# Patient Record
Sex: FEMALE | Race: WHITE | Employment: UNEMPLOYED | URBAN - METROPOLITAN AREA
[De-identification: names, ages, dates, MRNs, and addresses within clinical notes are randomized per-mention and may not be internally consistent; named-entity substitution may affect disease eponyms.]

---

## 2019-02-18 PROBLEM — E66.01 MORBID OBESITY (HCC): Status: ACTIVE | Noted: 2019-02-18

## 2019-02-18 PROBLEM — E11.69 DIABETES MELLITUS TYPE 2 IN OBESE (HCC): Status: ACTIVE | Noted: 2019-02-18

## 2019-02-18 PROBLEM — I10 ESSENTIAL HYPERTENSION: Status: ACTIVE | Noted: 2019-02-18

## 2019-02-18 PROBLEM — E66.9 DIABETES MELLITUS TYPE 2 IN OBESE (HCC): Status: ACTIVE | Noted: 2019-02-18

## 2019-02-18 PROBLEM — J44.9 COPD MIXED TYPE (HCC): Status: ACTIVE | Noted: 2019-02-18

## 2019-02-18 PROBLEM — Z86.79 HX OF CONGESTIVE HEART FAILURE: Status: ACTIVE | Noted: 2019-02-18

## 2019-02-19 ENCOUNTER — OFFICE VISIT (OUTPATIENT)
Dept: FAMILY MEDICINE CLINIC | Age: 69
End: 2019-02-19
Payer: COMMERCIAL

## 2019-02-19 VITALS
OXYGEN SATURATION: 92 % | HEIGHT: 61 IN | RESPIRATION RATE: 16 BRPM | SYSTOLIC BLOOD PRESSURE: 92 MMHG | HEART RATE: 70 BPM | BODY MASS INDEX: 41.84 KG/M2 | DIASTOLIC BLOOD PRESSURE: 56 MMHG | WEIGHT: 221.6 LBS

## 2019-02-19 DIAGNOSIS — Z86.79 HX OF CONGESTIVE HEART FAILURE: ICD-10-CM

## 2019-02-19 DIAGNOSIS — M54.42 CHRONIC MIDLINE LOW BACK PAIN WITH BILATERAL SCIATICA: ICD-10-CM

## 2019-02-19 DIAGNOSIS — Z23 NEED FOR VACCINATION AGAINST STREPTOCOCCUS PNEUMONIAE USING PNEUMOCOCCAL CONJUGATE VACCINE 13: ICD-10-CM

## 2019-02-19 DIAGNOSIS — E11.69 DIABETES MELLITUS TYPE 2 IN OBESE (HCC): ICD-10-CM

## 2019-02-19 DIAGNOSIS — F41.9 ANXIETY AND DEPRESSION: ICD-10-CM

## 2019-02-19 DIAGNOSIS — R25.1 TREMOR: ICD-10-CM

## 2019-02-19 DIAGNOSIS — E11.42 DIABETIC PERIPHERAL NEUROPATHY (HCC): ICD-10-CM

## 2019-02-19 DIAGNOSIS — G89.29 CHRONIC MIDLINE LOW BACK PAIN WITH BILATERAL SCIATICA: ICD-10-CM

## 2019-02-19 DIAGNOSIS — Z12.11 SCREEN FOR COLON CANCER: ICD-10-CM

## 2019-02-19 DIAGNOSIS — I10 ESSENTIAL HYPERTENSION: ICD-10-CM

## 2019-02-19 DIAGNOSIS — Z91.81 AT HIGH RISK FOR FALLS: ICD-10-CM

## 2019-02-19 DIAGNOSIS — Z00.00 ROUTINE GENERAL MEDICAL EXAMINATION AT A HEALTH CARE FACILITY: Primary | ICD-10-CM

## 2019-02-19 DIAGNOSIS — M89.9 OSTEOPATHY: ICD-10-CM

## 2019-02-19 DIAGNOSIS — E66.01 MORBID OBESITY (HCC): ICD-10-CM

## 2019-02-19 DIAGNOSIS — J44.9 COPD MIXED TYPE (HCC): ICD-10-CM

## 2019-02-19 DIAGNOSIS — F32.A ANXIETY AND DEPRESSION: ICD-10-CM

## 2019-02-19 DIAGNOSIS — E66.9 DIABETES MELLITUS TYPE 2 IN OBESE (HCC): ICD-10-CM

## 2019-02-19 DIAGNOSIS — I25.10 CORONARY ARTERY DISEASE INVOLVING NATIVE CORONARY ARTERY OF NATIVE HEART WITHOUT ANGINA PECTORIS: ICD-10-CM

## 2019-02-19 DIAGNOSIS — G47.33 OSA (OBSTRUCTIVE SLEEP APNEA): ICD-10-CM

## 2019-02-19 DIAGNOSIS — Z76.89 ESTABLISHING CARE WITH NEW DOCTOR, ENCOUNTER FOR: ICD-10-CM

## 2019-02-19 DIAGNOSIS — E78.5 DYSLIPIDEMIA: ICD-10-CM

## 2019-02-19 DIAGNOSIS — Z12.39 SCREENING FOR BREAST CANCER: ICD-10-CM

## 2019-02-19 DIAGNOSIS — M54.41 CHRONIC MIDLINE LOW BACK PAIN WITH BILATERAL SCIATICA: ICD-10-CM

## 2019-02-19 LAB — HBA1C MFR BLD: 8.8 %

## 2019-02-19 PROCEDURE — 99387 INIT PM E/M NEW PAT 65+ YRS: CPT | Performed by: FAMILY MEDICINE

## 2019-02-19 PROCEDURE — G0009 ADMIN PNEUMOCOCCAL VACCINE: HCPCS | Performed by: FAMILY MEDICINE

## 2019-02-19 PROCEDURE — 83036 HEMOGLOBIN GLYCOSYLATED A1C: CPT | Performed by: FAMILY MEDICINE

## 2019-02-19 PROCEDURE — 90670 PCV13 VACCINE IM: CPT | Performed by: FAMILY MEDICINE

## 2019-02-19 PROCEDURE — 99204 OFFICE O/P NEW MOD 45 MIN: CPT | Performed by: FAMILY MEDICINE

## 2019-02-19 RX ORDER — ATORVASTATIN CALCIUM 80 MG/1
TABLET, FILM COATED ORAL
Refills: 0 | COMMUNITY
Start: 2019-02-03 | End: 2019-02-19 | Stop reason: SDUPTHER

## 2019-02-19 RX ORDER — ALBUTEROL SULFATE 90 UG/1
2 AEROSOL, METERED RESPIRATORY (INHALATION) 4 TIMES DAILY PRN
Qty: 1 INHALER | Refills: 3 | Status: SHIPPED | OUTPATIENT
Start: 2019-02-19 | End: 2019-06-30 | Stop reason: SDUPTHER

## 2019-02-19 RX ORDER — FLUTICASONE FUROATE AND VILANTEROL 100; 25 UG/1; UG/1
1 POWDER RESPIRATORY (INHALATION) DAILY
Qty: 3 EACH | Refills: 3 | Status: SHIPPED | OUTPATIENT
Start: 2019-02-19 | End: 2020-01-03

## 2019-02-19 RX ORDER — METOPROLOL TARTRATE 50 MG/1
TABLET, FILM COATED ORAL
Refills: 0 | COMMUNITY
Start: 2018-11-29 | End: 2019-04-05 | Stop reason: SDUPTHER

## 2019-02-19 RX ORDER — RANOLAZINE 500 MG/1
TABLET, FILM COATED, EXTENDED RELEASE ORAL
Refills: 0 | COMMUNITY
Start: 2019-01-24 | End: 2019-05-02 | Stop reason: SDUPTHER

## 2019-02-19 RX ORDER — ATORVASTATIN CALCIUM 80 MG/1
80 TABLET, FILM COATED ORAL DAILY
Qty: 90 TABLET | Refills: 3 | Status: SHIPPED | OUTPATIENT
Start: 2019-02-19 | End: 2020-01-03 | Stop reason: SDUPTHER

## 2019-02-19 RX ORDER — LANCETS 30 GAUGE
EACH MISCELLANEOUS
Qty: 200 EACH | Refills: 5 | Status: SHIPPED | OUTPATIENT
Start: 2019-02-19 | End: 2020-01-03 | Stop reason: SDUPTHER

## 2019-02-19 RX ORDER — GLIMEPIRIDE 2 MG/1
2 TABLET ORAL
Qty: 60 TABLET | Refills: 2 | Status: SHIPPED | OUTPATIENT
Start: 2019-02-19 | End: 2019-05-10 | Stop reason: SDUPTHER

## 2019-02-19 RX ORDER — ACETAMINOPHEN 500 MG
1000 TABLET ORAL 4 TIMES DAILY PRN
Qty: 90 TABLET | Refills: 11 | Status: SHIPPED | OUTPATIENT
Start: 2019-02-19 | End: 2019-03-14 | Stop reason: ALTCHOICE

## 2019-02-19 RX ORDER — VENLAFAXINE HYDROCHLORIDE 150 MG/1
150 CAPSULE, EXTENDED RELEASE ORAL DAILY
Qty: 30 CAPSULE | Refills: 1 | Status: SHIPPED | OUTPATIENT
Start: 2019-02-19 | End: 2019-04-02 | Stop reason: SDUPTHER

## 2019-02-19 RX ORDER — SERTRALINE HYDROCHLORIDE 100 MG/1
TABLET, FILM COATED ORAL
Refills: 0 | COMMUNITY
Start: 2019-01-28 | End: 2019-02-19

## 2019-02-19 RX ORDER — ISOSORBIDE MONONITRATE 30 MG/1
TABLET, EXTENDED RELEASE ORAL
Refills: 0 | COMMUNITY
Start: 2019-01-28 | End: 2019-04-02 | Stop reason: ALTCHOICE

## 2019-02-19 RX ORDER — CLOPIDOGREL BISULFATE 75 MG/1
TABLET ORAL
Refills: 0 | COMMUNITY
Start: 2019-01-28 | End: 2019-06-28 | Stop reason: SDUPTHER

## 2019-02-19 ASSESSMENT — ENCOUNTER SYMPTOMS
BLOOD IN STOOL: 0
SHORTNESS OF BREATH: 0
BACK PAIN: 1
EYE PAIN: 0

## 2019-02-19 ASSESSMENT — PATIENT HEALTH QUESTIONNAIRE - PHQ9
1. LITTLE INTEREST OR PLEASURE IN DOING THINGS: 3
2. FEELING DOWN, DEPRESSED OR HOPELESS: 3
SUM OF ALL RESPONSES TO PHQ9 QUESTIONS 1 & 2: 6
SUM OF ALL RESPONSES TO PHQ QUESTIONS 1-9: 6
SUM OF ALL RESPONSES TO PHQ QUESTIONS 1-9: 6

## 2019-03-08 RX ORDER — LISINOPRIL 30 MG/1
30 TABLET ORAL DAILY
Qty: 30 TABLET | Refills: 0 | Status: SHIPPED | OUTPATIENT
Start: 2019-03-08 | End: 2019-04-02 | Stop reason: SDUPTHER

## 2019-03-14 ENCOUNTER — OFFICE VISIT (OUTPATIENT)
Dept: NEUROLOGY | Age: 69
End: 2019-03-14
Payer: COMMERCIAL

## 2019-03-14 VITALS
WEIGHT: 215 LBS | SYSTOLIC BLOOD PRESSURE: 120 MMHG | HEART RATE: 78 BPM | DIASTOLIC BLOOD PRESSURE: 77 MMHG | HEIGHT: 61 IN | BODY MASS INDEX: 40.59 KG/M2

## 2019-03-14 DIAGNOSIS — G62.9 PERIPHERAL POLYNEUROPATHY: ICD-10-CM

## 2019-03-14 DIAGNOSIS — E11.65 POORLY CONTROLLED DIABETES MELLITUS (HCC): Primary | ICD-10-CM

## 2019-03-14 DIAGNOSIS — F32.A DEPRESSION, UNSPECIFIED DEPRESSION TYPE: ICD-10-CM

## 2019-03-14 DIAGNOSIS — R25.1 TREMORS OF NERVOUS SYSTEM: ICD-10-CM

## 2019-03-14 DIAGNOSIS — Z72.820 POOR SLEEP: ICD-10-CM

## 2019-03-14 PROCEDURE — 99205 OFFICE O/P NEW HI 60 MIN: CPT | Performed by: PSYCHIATRY & NEUROLOGY

## 2019-03-14 RX ORDER — LANOLIN ALCOHOL/MO/W.PET/CERES
10 CREAM (GRAM) TOPICAL NIGHTLY PRN
COMMUNITY
End: 2020-11-04

## 2019-03-18 ENCOUNTER — TELEPHONE (OUTPATIENT)
Dept: FAMILY MEDICINE CLINIC | Age: 69
End: 2019-03-18

## 2019-04-02 ENCOUNTER — HOSPITAL ENCOUNTER (OUTPATIENT)
Age: 69
Setting detail: SPECIMEN
Discharge: HOME | End: 2019-04-02
Payer: COMMERCIAL

## 2019-04-02 ENCOUNTER — OFFICE VISIT (OUTPATIENT)
Dept: FAMILY MEDICINE CLINIC | Age: 69
End: 2019-04-02
Payer: COMMERCIAL

## 2019-04-02 VITALS
RESPIRATION RATE: 16 BRPM | WEIGHT: 215 LBS | OXYGEN SATURATION: 93 % | DIASTOLIC BLOOD PRESSURE: 62 MMHG | HEIGHT: 61 IN | BODY MASS INDEX: 40.59 KG/M2 | SYSTOLIC BLOOD PRESSURE: 98 MMHG | HEART RATE: 67 BPM

## 2019-04-02 DIAGNOSIS — I10 ESSENTIAL HYPERTENSION: ICD-10-CM

## 2019-04-02 DIAGNOSIS — E11.69 DIABETES MELLITUS TYPE 2 IN OBESE (HCC): ICD-10-CM

## 2019-04-02 DIAGNOSIS — I25.10 CORONARY ARTERY DISEASE INVOLVING NATIVE CORONARY ARTERY OF NATIVE HEART WITHOUT ANGINA PECTORIS: ICD-10-CM

## 2019-04-02 DIAGNOSIS — Z91.81 AT HIGH RISK FOR FALLS: ICD-10-CM

## 2019-04-02 DIAGNOSIS — Z00.00 ROUTINE GENERAL MEDICAL EXAMINATION AT A HEALTH CARE FACILITY: ICD-10-CM

## 2019-04-02 DIAGNOSIS — E11.42 DIABETIC PERIPHERAL NEUROPATHY (HCC): ICD-10-CM

## 2019-04-02 DIAGNOSIS — E66.9 DIABETES MELLITUS TYPE 2 IN OBESE (HCC): ICD-10-CM

## 2019-04-02 DIAGNOSIS — F41.9 ANXIETY AND DEPRESSION: ICD-10-CM

## 2019-04-02 DIAGNOSIS — E78.5 DYSLIPIDEMIA: ICD-10-CM

## 2019-04-02 DIAGNOSIS — R25.1 TREMOR: Primary | ICD-10-CM

## 2019-04-02 DIAGNOSIS — F32.A ANXIETY AND DEPRESSION: ICD-10-CM

## 2019-04-02 LAB
ABSOLUTE EOS #: 0.12 K/UL (ref 0–0.44)
ABSOLUTE IMMATURE GRANULOCYTE: 0.1 K/UL (ref 0–0.3)
ABSOLUTE LYMPH #: 3.91 K/UL (ref 1.1–3.7)
ABSOLUTE MONO #: 0.98 K/UL (ref 0.1–1.2)
ALBUMIN SERPL-MCNC: 4.2 G/DL (ref 3.5–5.2)
ALBUMIN/GLOBULIN RATIO: 1.1 (ref 1–2.5)
ALP BLD-CCNC: 82 U/L (ref 35–104)
ALT SERPL-CCNC: 11 U/L (ref 5–33)
ANION GAP SERPL CALCULATED.3IONS-SCNC: 17 MMOL/L (ref 9–17)
AST SERPL-CCNC: 15 U/L
BASOPHILS # BLD: 1 % (ref 0–2)
BASOPHILS ABSOLUTE: 0.1 K/UL (ref 0–0.2)
BILIRUB SERPL-MCNC: 0.47 MG/DL (ref 0.3–1.2)
BUN BLDV-MCNC: 11 MG/DL (ref 8–23)
BUN/CREAT BLD: ABNORMAL (ref 9–20)
CALCIUM SERPL-MCNC: 9.7 MG/DL (ref 8.6–10.4)
CHLORIDE BLD-SCNC: 103 MMOL/L (ref 98–107)
CHOLESTEROL/HDL RATIO: 2.5
CHOLESTEROL: 144 MG/DL
CO2: 22 MMOL/L (ref 20–31)
CREAT SERPL-MCNC: 1.07 MG/DL (ref 0.5–0.9)
DIFFERENTIAL TYPE: ABNORMAL
EOSINOPHILS RELATIVE PERCENT: 1 % (ref 1–4)
ESTIMATED AVERAGE GLUCOSE: 166 MG/DL
GFR AFRICAN AMERICAN: >60 ML/MIN
GFR NON-AFRICAN AMERICAN: 51 ML/MIN
GFR SERPL CREATININE-BSD FRML MDRD: ABNORMAL ML/MIN/{1.73_M2}
GFR SERPL CREATININE-BSD FRML MDRD: ABNORMAL ML/MIN/{1.73_M2}
GLUCOSE BLD-MCNC: 156 MG/DL (ref 70–99)
HBA1C MFR BLD: 7.4 % (ref 4–6)
HCT VFR BLD CALC: 48.5 % (ref 36.3–47.1)
HDLC SERPL-MCNC: 58 MG/DL
HEMOGLOBIN: 15.1 G/DL (ref 11.9–15.1)
HEPATITIS C ANTIBODY: NONREACTIVE
HIV AG/AB: NONREACTIVE
IMMATURE GRANULOCYTES: 1 %
LDL CHOLESTEROL: 59 MG/DL (ref 0–130)
LYMPHOCYTES # BLD: 26 % (ref 24–43)
MCH RBC QN AUTO: 29 PG (ref 25.2–33.5)
MCHC RBC AUTO-ENTMCNC: 31.1 G/DL (ref 28.4–34.8)
MCV RBC AUTO: 93.1 FL (ref 82.6–102.9)
MONOCYTES # BLD: 7 % (ref 3–12)
NRBC AUTOMATED: 0 PER 100 WBC
PDW BLD-RTO: 13.3 % (ref 11.8–14.4)
PLATELET # BLD: 478 K/UL (ref 138–453)
PLATELET ESTIMATE: ABNORMAL
PMV BLD AUTO: 10.4 FL (ref 8.1–13.5)
POTASSIUM SERPL-SCNC: 4.4 MMOL/L (ref 3.7–5.3)
RBC # BLD: 5.21 M/UL (ref 3.95–5.11)
RBC # BLD: ABNORMAL 10*6/UL
SEG NEUTROPHILS: 64 % (ref 36–65)
SEGMENTED NEUTROPHILS ABSOLUTE COUNT: 9.98 K/UL (ref 1.5–8.1)
SODIUM BLD-SCNC: 142 MMOL/L (ref 135–144)
TOTAL PROTEIN: 7.9 G/DL (ref 6.4–8.3)
TRIGL SERPL-MCNC: 135 MG/DL
TSH SERPL DL<=0.05 MIU/L-ACNC: 2.66 MIU/L (ref 0.3–5)
VLDLC SERPL CALC-MCNC: NORMAL MG/DL (ref 1–30)
WBC # BLD: 15.2 K/UL (ref 3.5–11.3)
WBC # BLD: ABNORMAL 10*3/UL

## 2019-04-02 PROCEDURE — 99214 OFFICE O/P EST MOD 30 MIN: CPT | Performed by: FAMILY MEDICINE

## 2019-04-02 RX ORDER — VENLAFAXINE HYDROCHLORIDE 150 MG/1
150 CAPSULE, EXTENDED RELEASE ORAL DAILY
Qty: 30 CAPSULE | Refills: 1 | Status: SHIPPED | OUTPATIENT
Start: 2019-04-02 | End: 2019-05-02 | Stop reason: ALTCHOICE

## 2019-04-02 RX ORDER — VENLAFAXINE HYDROCHLORIDE 75 MG/1
75 CAPSULE, EXTENDED RELEASE ORAL DAILY
Qty: 30 CAPSULE | Refills: 1 | Status: SHIPPED | OUTPATIENT
Start: 2019-04-02 | End: 2019-05-02 | Stop reason: ALTCHOICE

## 2019-04-02 RX ORDER — LISINOPRIL 30 MG/1
30 TABLET ORAL DAILY
Qty: 30 TABLET | Refills: 1 | Status: SHIPPED | OUTPATIENT
Start: 2019-04-02 | End: 2019-05-02 | Stop reason: SDUPTHER

## 2019-04-02 RX ORDER — GABAPENTIN 300 MG/1
CAPSULE ORAL
Qty: 90 CAPSULE | Refills: 1 | Status: SHIPPED | OUTPATIENT
Start: 2019-04-02 | End: 2019-04-24 | Stop reason: SINTOL

## 2019-04-02 RX ORDER — M-VIT,TX,IRON,MINS/CALC/FOLIC 27MG-0.4MG
1 TABLET ORAL DAILY
COMMUNITY
End: 2020-09-30 | Stop reason: SDUPTHER

## 2019-04-02 ASSESSMENT — ENCOUNTER SYMPTOMS
BLOOD IN STOOL: 0
SHORTNESS OF BREATH: 0
EYE PAIN: 0

## 2019-04-02 NOTE — PROGRESS NOTES
Visit Information    Have you changed or started any medications since your last visit including any over-the-counter medicines, vitamins, or herbal medicines? no   Are you having any side effects from any of your medications? -  no  Have you stopped taking any of your medications? Is so, why? -  no    Have you seen any other physician or provider since your last visit? No  Have you had any other diagnostic tests since your last visit? No  Have you been seen in the emergency room and/or had an admission to a hospital since we last saw you? No  Have you had your routine dental cleaning in the past 6 months? no    Have you activated your [employer] account? If not, what are your barriers?  No: na     Patient Care Team:  Juan Urias MD as PCP - General (Family Medicine)  Charley Gibson MD as Consulting Physician (Gastroenterology)    Medical History Review  Past Medical, Family, and Social History reviewed and does not contribute to the patient presenting condition    Health Maintenance   Topic Date Due    Potassium monitoring  1952    Creatinine monitoring  1952    Hepatitis C screen  1950    Diabetic foot exam  04/29/1962    Diabetic retinal exam  04/29/1962    Lipid screen  04/29/1962    Diabetic microalbuminuria test  04/29/1970    DTaP/Tdap/Td vaccine (1 - Tdap) 04/29/1971    Breast cancer screen  04/29/2002    Shingles Vaccine (1 of 2) 04/29/2002    Colon cancer screen colonoscopy  04/29/2002    DEXA (modify frequency per FRAX score)  04/29/2017    Flu vaccine (Season Ended) 02/19/2020 (Originally 9/1/2019)    A1C test (Diabetic or Prediabetic)  02/19/2020    Pneumococcal 65+ years Vaccine (2 of 2 - PPSV23) 02/01/2021

## 2019-04-02 NOTE — PATIENT INSTRUCTIONS
[facility], [address] [place] [address], [address] [place] [address]   [phone]

## 2019-04-02 NOTE — PROGRESS NOTES
heard.  Pulmonary/Chest: Effort normal and breath sounds normal. No respiratory distress. She has no wheezes. Neurological: She is alert. Skin: She is not diaphoretic. Diabetic foot exam: Sensation intact in bilateral feet on testing with monofilament, no ulceration noted   Psychiatric: She has a normal mood and affect. Her behavior is normal. Judgment and thought content normal.       Assessment & Plan:      1. Anxiety and depression  Effexor dose increased, follow-up in 4 weeks  - venlafaxine (EFFEXOR XR) 150 MG extended release capsule; Take 1 capsule by mouth daily Take with 75 mg capsule  Dispense: 30 capsule; Refill: 1  - venlafaxine (EFFEXOR XR) 75 MG extended release capsule; Take 1 capsule by mouth daily  Dispense: 30 capsule; Refill: 1    2. Diabetes mellitus type 2 in [facility])  She does not have her glucose log with her, labs ordered, continue Ozempic and Amaryl, follow-up with glucose log at next visit  - Insulin Pen Needle 32G X 4 MM MISC; 1 each by Does not apply route once a week  Dispense: 200 each; Refill: 3  - Hemoglobin A1C; Future  - Microalbumin, Ur; Future    3. Tremor  Referral sent for second opinion for query Parkinson's  - Allen Bullard MD, Neurology, [facility]    4. At high risk for falls  - Misc. Devices ([employer] QUAD CANE) MISC; Use daily with ambulation    5. Diabetic peripheral neuropathy (HCC)  Rx for gabapentin, advised on sedation, follow-up in 4 weeks  - gabapentin (NEURONTIN) 300 MG capsule; Day 1: 300 mg, Day 2: 300 mg twice daily, Day 3 onwards: 300 mg 3 times daily  Dispense: 90 capsule; Refill: 1  - [facility] DIABETES FOOT EXAM    HTN, CAD  Referral information reprinted for the cardiologist, continue lisinopril, Imdur stopped due to hypotension, follow-up in 4 weeks    Call or return to clinic prn if these symptoms worsen or fail to improve as anticipated.   I have reviewed the instructions with the patient, answering all questions to their satisfaction.     Electronically signed by Carlos Dolan MD on 4/2/2019 at 3:43 PM

## 2019-04-03 DIAGNOSIS — N18.30 STAGE 3 CHRONIC KIDNEY DISEASE (HCC): ICD-10-CM

## 2019-04-03 DIAGNOSIS — D72.829 LEUKOCYTOSIS, UNSPECIFIED TYPE: Primary | ICD-10-CM

## 2019-04-06 RX ORDER — METOPROLOL TARTRATE 50 MG/1
TABLET, FILM COATED ORAL
Qty: 90 TABLET | Refills: 3 | Status: SHIPPED | OUTPATIENT
Start: 2019-04-06 | End: 2019-05-02 | Stop reason: ALTCHOICE

## 2019-04-10 DIAGNOSIS — E66.9 DIABETES MELLITUS TYPE 2 IN OBESE (HCC): ICD-10-CM

## 2019-04-10 DIAGNOSIS — E11.69 DIABETES MELLITUS TYPE 2 IN OBESE (HCC): ICD-10-CM

## 2019-04-24 ENCOUNTER — OFFICE VISIT (OUTPATIENT)
Dept: NEUROLOGY | Age: 69
End: 2019-04-24
Payer: COMMERCIAL

## 2019-04-24 VITALS
HEART RATE: 79 BPM | DIASTOLIC BLOOD PRESSURE: 62 MMHG | SYSTOLIC BLOOD PRESSURE: 92 MMHG | WEIGHT: 215 LBS | BODY MASS INDEX: 40.65 KG/M2

## 2019-04-24 DIAGNOSIS — F32.A DEPRESSION, UNSPECIFIED DEPRESSION TYPE: ICD-10-CM

## 2019-04-24 DIAGNOSIS — Z72.820 POOR SLEEP: ICD-10-CM

## 2019-04-24 DIAGNOSIS — E11.65 POORLY CONTROLLED DIABETES MELLITUS (HCC): ICD-10-CM

## 2019-04-24 DIAGNOSIS — R25.1 TREMORS OF NERVOUS SYSTEM: Primary | ICD-10-CM

## 2019-04-24 DIAGNOSIS — G62.9 PERIPHERAL POLYNEUROPATHY: ICD-10-CM

## 2019-04-24 PROCEDURE — 99205 OFFICE O/P NEW HI 60 MIN: CPT | Performed by: STUDENT IN AN ORGANIZED HEALTH CARE EDUCATION/TRAINING PROGRAM

## 2019-04-24 RX ORDER — GABAPENTIN 300 MG/1
CAPSULE ORAL
Qty: 360 CAPSULE | Refills: 0 | Status: SHIPPED | OUTPATIENT
Start: 2019-04-24 | End: 2019-09-09 | Stop reason: SDUPTHER

## 2019-04-24 ASSESSMENT — ENCOUNTER SYMPTOMS
PHOTOPHOBIA: 0
VOMITING: 0
EYE PAIN: 0
EYE REDNESS: 0
SINUS PAIN: 0
SHORTNESS OF BREATH: 1
NAUSEA: 0
EYE DISCHARGE: 0
SORE THROAT: 0
BACK PAIN: 1
COUGH: 0
CONSTIPATION: 0
DIARRHEA: 0
ABDOMINAL PAIN: 0
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
## 2019-05-02 ENCOUNTER — OFFICE VISIT (OUTPATIENT)
Dept: FAMILY MEDICINE CLINIC | Age: 69
End: 2019-05-02
Payer: COMMERCIAL

## 2019-05-02 VITALS
BODY MASS INDEX: 40.75 KG/M2 | HEIGHT: 61 IN | DIASTOLIC BLOOD PRESSURE: 53 MMHG | WEIGHT: 215.8 LBS | SYSTOLIC BLOOD PRESSURE: 82 MMHG | HEART RATE: 77 BPM | RESPIRATION RATE: 16 BRPM | OXYGEN SATURATION: 92 %

## 2019-05-02 DIAGNOSIS — I25.10 CORONARY ARTERY DISEASE INVOLVING NATIVE CORONARY ARTERY OF NATIVE HEART WITHOUT ANGINA PECTORIS: ICD-10-CM

## 2019-05-02 DIAGNOSIS — J44.9 COPD MIXED TYPE (HCC): ICD-10-CM

## 2019-05-02 DIAGNOSIS — F41.9 ANXIETY AND DEPRESSION: ICD-10-CM

## 2019-05-02 DIAGNOSIS — F32.A ANXIETY AND DEPRESSION: ICD-10-CM

## 2019-05-02 DIAGNOSIS — E11.69 DIABETES MELLITUS TYPE 2 IN OBESE (HCC): ICD-10-CM

## 2019-05-02 DIAGNOSIS — E66.9 DIABETES MELLITUS TYPE 2 IN OBESE (HCC): ICD-10-CM

## 2019-05-02 DIAGNOSIS — I10 ESSENTIAL HYPERTENSION: Primary | ICD-10-CM

## 2019-05-02 PROCEDURE — 99214 OFFICE O/P EST MOD 30 MIN: CPT | Performed by: FAMILY MEDICINE

## 2019-05-02 RX ORDER — RANOLAZINE 500 MG/1
500 TABLET, EXTENDED RELEASE ORAL 2 TIMES DAILY
Qty: 60 TABLET | Refills: 2 | Status: SHIPPED | OUTPATIENT
Start: 2019-05-02 | End: 2019-08-02 | Stop reason: SDUPTHER

## 2019-05-02 RX ORDER — BISOPROLOL FUMARATE 5 MG/1
5 TABLET ORAL DAILY
Qty: 90 TABLET | Refills: 3 | Status: SHIPPED | OUTPATIENT
Start: 2019-05-02 | End: 2019-09-13 | Stop reason: SDUPTHER

## 2019-05-02 RX ORDER — LISINOPRIL 20 MG/1
20 TABLET ORAL DAILY
Qty: 90 TABLET | Refills: 3 | Status: SHIPPED | OUTPATIENT
Start: 2019-05-02 | End: 2019-09-13 | Stop reason: SDUPTHER

## 2019-05-02 RX ORDER — DULOXETIN HYDROCHLORIDE 60 MG/1
60 CAPSULE, DELAYED RELEASE ORAL DAILY
Qty: 30 CAPSULE | Refills: 1 | Status: SHIPPED | OUTPATIENT
Start: 2019-05-02 | End: 2019-05-10 | Stop reason: ALTCHOICE

## 2019-05-02 ASSESSMENT — ENCOUNTER SYMPTOMS
SHORTNESS OF BREATH: 0
BLOOD IN STOOL: 0
EYE PAIN: 0

## 2019-05-02 NOTE — PATIENT INSTRUCTIONS
[address] [facility], [address] [place] [address], [address] [place] [address]   [phone]

## 2019-05-02 NOTE — PROGRESS NOTES
[facility]  [address] [place] [id number]  Dept: [phone]    Natalee Lorenz is a 81 y.o. female who presents today for hermedical conditions/complaints as noted below.   Natalee Lorenz is here today c/o 1 Month Follow-Up       HPI:     HPI    Seen today with     T2DM, A1C 7.4 4/3, on Ozempic, Amaryl   Not checking sugars at home  Last eye exam: March 2019    Depression, Effexor dose increased, still feels agitated, on edge, also has issues with chronic pain, would like to change to a different medication, denies SI, not interested in seeing counselor  CAD, referred to cardiology but did not go, stopped Imdur as BP low, blood pressure continues to be low today, endorses feeling dizzy at times  Tremors, saw neurology Dr. Kerrie Lawler, she does not think she has Parkinson's, gabapentin dose increased, told to follow up in 3 months  COPD, Rx for Breo ellipta, morning cough with wheezing persists, using her inhaler regularly  WBC, creatinine elevated, labs ordered for repeat    Still did not do DEXA, mammogram, cologuard    Patient Active Problem List   Diagnosis    Essential hypertension    Diabetes mellitus type 2 in obese ([address]    Hx of congestive heart failure    COPD mixed type ([address]    Morbid obesity ([address]    Chronic midline low back pain with bilateral sciatica    Diabetic peripheral neuropathy ([address]    Dyslipidemia    Coronary artery disease involving native coronary artery of native heart without angina pectoris    SAGAR (obstructive sleep apnea)    Stage 3 chronic kidney disease ([address]       Past Medical History:   Diagnosis Date    Acid reflux     Arthritis     CHF (congestive heart failure) ([address]     COPD (chronic obstructive pulmonary disease) ([address]     Gall stones     Head injury     Heart attack ([address] 1999    Hyperlipidemia     Hypertension     Insomnia     Macular degeneration     MI (PLAVIX) 75 MG tablet, , Disp: , Rfl: 0    Lancets MISC, T2DM, use up to three times daily as needed, Disp: 200 each, Rfl: 5    Blood Glucose Monitoring Suppl KIT, Blood glucose monitor, Disp: 1 kit, Rfl: 0    blood glucose test strips ([employer] BLOOD GLUCOSE TEST) strip, T2DM, use up to three times daily as needed, Disp: 200 strip, Rfl: 5    Semaglutide (OZEMPIC) 1 MG/DOSE SOPN, Already has sample kit to start on ozempic, now needs 1 mg per week, Disp: 2 pen, Rfl: 5    glimepiride (AMARYL) 2 MG tablet, Take 1 tablet by mouth 2 times daily (before meals), Disp: 60 tablet, Rfl: 2    atorvastatin (LIPITOR) 80 MG tablet, Take 1 tablet by mouth daily, Disp: 90 tablet, Rfl: 3    albuterol sulfate  (90 Base) MCG/ACT inhaler, Inhale 2 puffs into the lungs 4 times daily as needed for Wheezing, Disp: 1 Inhaler, Rfl: 3    fluticasone-vilanterol (BREO ELLIPTA) 100-25 MCG/INH AEPB inhaler, Inhale 1 puff into the lungs daily, Disp: 3 each, Rfl: 3    Subjective:     Review of Systems   Constitutional: Negative for appetite change, diaphoresis, fever and unexpected weight change. HENT: Negative for ear pain. Eyes: Negative for pain. Respiratory: Negative for shortness of breath. Cardiovascular: Negative for chest pain and leg swelling. Gastrointestinal: Negative for blood in stool. Musculoskeletal: Negative for gait problem. Skin: Negative for pallor. Neurological: Positive for dizziness. Negative for seizures. Psychiatric/Behavioral: Positive for agitation. Negative for suicidal ideas. The patient is nervous/anxious. Objective:     BP (!) 82/53   Pulse 77   Resp 16   Ht 5' 1\" (1.549 m)   Wt 215 lb 12.8 oz (97.9 kg)   SpO2 92%   BMI 40.78 kg/m²     Physical Exam   Constitutional: She appears well-developed. HENT:   Head: Normocephalic. Eyes: Conjunctivae and EOM are normal.   Cardiovascular: Normal heart sounds. No murmur heard.   Pulmonary/Chest: Effort normal and breath sounds

## 2019-05-07 ENCOUNTER — TELEPHONE (OUTPATIENT)
Dept: FAMILY MEDICINE CLINIC | Age: 69
End: 2019-05-07

## 2019-05-10 DIAGNOSIS — E11.69 DIABETES MELLITUS TYPE 2 IN OBESE (HCC): ICD-10-CM

## 2019-05-10 DIAGNOSIS — E66.9 DIABETES MELLITUS TYPE 2 IN OBESE (HCC): ICD-10-CM

## 2019-05-10 DIAGNOSIS — G89.29 CHRONIC MIDLINE LOW BACK PAIN WITH BILATERAL SCIATICA: Primary | ICD-10-CM

## 2019-05-10 DIAGNOSIS — M54.41 CHRONIC MIDLINE LOW BACK PAIN WITH BILATERAL SCIATICA: Primary | ICD-10-CM

## 2019-05-10 DIAGNOSIS — M54.42 CHRONIC MIDLINE LOW BACK PAIN WITH BILATERAL SCIATICA: Primary | ICD-10-CM

## 2019-05-10 RX ORDER — GLIMEPIRIDE 2 MG/1
TABLET ORAL
Qty: 180 TABLET | Refills: 3 | Status: SHIPPED | OUTPATIENT
Start: 2019-05-10 | End: 2020-01-03 | Stop reason: SDUPTHER

## 2019-05-10 NOTE — TELEPHONE ENCOUNTER
PT states her recent change cymbalta is not working. Pt reports being more irritable on this medication and would like to try another med.  Please advise

## 2019-05-13 ENCOUNTER — TELEPHONE (OUTPATIENT)
Dept: FAMILY MEDICINE CLINIC | Age: 69
End: 2019-05-13

## 2019-06-11 LAB
BASOPHILS ABSOLUTE: NORMAL /ΜL
BASOPHILS RELATIVE PERCENT: NORMAL %
EOSINOPHILS ABSOLUTE: NORMAL /ΜL
EOSINOPHILS RELATIVE PERCENT: NORMAL %
HCT VFR BLD CALC: 43.8 % (ref 36–46)
HEMOGLOBIN: 13.8 G/DL (ref 12–16)
LYMPHOCYTES ABSOLUTE: NORMAL /ΜL
LYMPHOCYTES RELATIVE PERCENT: NORMAL %
MCH RBC QN AUTO: 29.6 PG
MCHC RBC AUTO-ENTMCNC: 31.5 G/DL
MCV RBC AUTO: 94 FL
MONOCYTES ABSOLUTE: NORMAL /ΜL
MONOCYTES RELATIVE PERCENT: NORMAL %
NEUTROPHILS ABSOLUTE: NORMAL /ΜL
NEUTROPHILS RELATIVE PERCENT: NORMAL %
PDW BLD-RTO: 46.7 %
PLATELET # BLD: 456 K/ΜL
PMV BLD AUTO: NORMAL FL
RBC # BLD: 4.66 10^6/ΜL
WBC # BLD: 13.28 10^3/ML

## 2019-06-12 DIAGNOSIS — D72.829 LEUKOCYTOSIS, UNSPECIFIED TYPE: Primary | ICD-10-CM

## 2019-06-12 DIAGNOSIS — D72.829 LEUKOCYTOSIS, UNSPECIFIED TYPE: ICD-10-CM

## 2019-06-12 DIAGNOSIS — N18.30 STAGE 3 CHRONIC KIDNEY DISEASE (HCC): ICD-10-CM

## 2019-06-12 LAB
BUN BLDV-MCNC: 14 MG/DL
CALCIUM SERPL-MCNC: 9.2 MG/DL
CHLORIDE BLD-SCNC: 103 MMOL/L
CO2: 24 MMOL/L
CREAT SERPL-MCNC: 0.84 MG/DL
GFR CALCULATED: NORMAL
GLUCOSE BLD-MCNC: 130 MG/DL
POTASSIUM SERPL-SCNC: 4.5 MMOL/L
SODIUM BLD-SCNC: 138 MMOL/L

## 2019-06-13 ENCOUNTER — OFFICE VISIT (OUTPATIENT)
Dept: FAMILY MEDICINE CLINIC | Age: 69
End: 2019-06-13
Payer: COMMERCIAL

## 2019-06-13 ENCOUNTER — TELEPHONE (OUTPATIENT)
Dept: ONCOLOGY | Age: 69
End: 2019-06-13

## 2019-06-13 VITALS
HEART RATE: 68 BPM | WEIGHT: 220.8 LBS | BODY MASS INDEX: 41.69 KG/M2 | OXYGEN SATURATION: 90 % | HEIGHT: 61 IN | SYSTOLIC BLOOD PRESSURE: 103 MMHG | DIASTOLIC BLOOD PRESSURE: 66 MMHG | RESPIRATION RATE: 16 BRPM

## 2019-06-13 DIAGNOSIS — J44.9 COPD MIXED TYPE (HCC): ICD-10-CM

## 2019-06-13 DIAGNOSIS — R31.9 HEMATURIA, UNSPECIFIED TYPE: ICD-10-CM

## 2019-06-13 DIAGNOSIS — E66.9 DIABETES MELLITUS TYPE 2 IN OBESE (HCC): Primary | ICD-10-CM

## 2019-06-13 DIAGNOSIS — I10 ESSENTIAL HYPERTENSION: ICD-10-CM

## 2019-06-13 DIAGNOSIS — F41.9 ANXIETY AND DEPRESSION: ICD-10-CM

## 2019-06-13 DIAGNOSIS — D72.829 LEUKOCYTOSIS, UNSPECIFIED TYPE: ICD-10-CM

## 2019-06-13 DIAGNOSIS — E66.01 MORBID OBESITY (HCC): ICD-10-CM

## 2019-06-13 DIAGNOSIS — K21.9 GASTROESOPHAGEAL REFLUX DISEASE, ESOPHAGITIS PRESENCE NOT SPECIFIED: ICD-10-CM

## 2019-06-13 DIAGNOSIS — F32.A ANXIETY AND DEPRESSION: ICD-10-CM

## 2019-06-13 DIAGNOSIS — E11.69 DIABETES MELLITUS TYPE 2 IN OBESE (HCC): Primary | ICD-10-CM

## 2019-06-13 DIAGNOSIS — G47.00 INSOMNIA, UNSPECIFIED TYPE: ICD-10-CM

## 2019-06-13 DIAGNOSIS — E11.42 DIABETIC PERIPHERAL NEUROPATHY (HCC): ICD-10-CM

## 2019-06-13 PROCEDURE — 99214 OFFICE O/P EST MOD 30 MIN: CPT | Performed by: FAMILY MEDICINE

## 2019-06-13 RX ORDER — OMEPRAZOLE 40 MG/1
40 CAPSULE, DELAYED RELEASE ORAL
Qty: 90 CAPSULE | Refills: 3 | Status: SHIPPED | OUTPATIENT
Start: 2019-06-13 | End: 2020-02-12

## 2019-06-13 RX ORDER — TRAZODONE HYDROCHLORIDE 50 MG/1
50 TABLET ORAL NIGHTLY PRN
Qty: 30 TABLET | Refills: 3 | Status: SHIPPED | OUTPATIENT
Start: 2019-06-13 | End: 2019-09-13 | Stop reason: SDUPTHER

## 2019-06-13 RX ORDER — VENLAFAXINE HYDROCHLORIDE 75 MG/1
75 CAPSULE, EXTENDED RELEASE ORAL DAILY
Qty: 30 CAPSULE | Refills: 5 | Status: SHIPPED | OUTPATIENT
Start: 2019-06-13 | End: 2019-09-13 | Stop reason: SDUPTHER

## 2019-06-13 RX ORDER — VENLAFAXINE HYDROCHLORIDE 150 MG/1
150 CAPSULE, EXTENDED RELEASE ORAL DAILY
Qty: 30 CAPSULE | Refills: 5 | Status: SHIPPED | OUTPATIENT
Start: 2019-06-13 | End: 2019-09-13 | Stop reason: SDUPTHER

## 2019-06-13 ASSESSMENT — ENCOUNTER SYMPTOMS
BLOOD IN STOOL: 0
SHORTNESS OF BREATH: 0
EYE PAIN: 0
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
## 2019-06-17 DIAGNOSIS — R82.90 CLOUDY URINE: Primary | ICD-10-CM

## 2019-06-17 RX ORDER — NITROFURANTOIN 25; 75 MG/1; MG/1
100 CAPSULE ORAL 2 TIMES DAILY
Qty: 10 CAPSULE | Refills: 0 | Status: SHIPPED | OUTPATIENT
Start: 2019-06-17 | End: 2019-06-22

## 2019-06-19 ENCOUNTER — TELEPHONE (OUTPATIENT)
Dept: ONCOLOGY | Age: 69
End: 2019-06-19

## 2019-06-28 DIAGNOSIS — J44.9 COPD MIXED TYPE (HCC): ICD-10-CM

## 2019-06-30 RX ORDER — ALBUTEROL SULFATE 90 UG/1
2 AEROSOL, METERED RESPIRATORY (INHALATION) 4 TIMES DAILY PRN
Qty: 1 INHALER | Refills: 5 | Status: SHIPPED | OUTPATIENT
Start: 2019-06-30 | End: 2020-03-12 | Stop reason: SDUPTHER

## 2019-06-30 RX ORDER — CLOPIDOGREL BISULFATE 75 MG/1
75 TABLET ORAL DAILY
Qty: 30 TABLET | Refills: 3 | Status: SHIPPED | OUTPATIENT
Start: 2019-06-30 | End: 2019-10-17 | Stop reason: SDUPTHER

## 2019-07-01 ENCOUNTER — TELEPHONE (OUTPATIENT)
Dept: ONCOLOGY | Age: 69
End: 2019-07-01

## 2019-08-13 ENCOUNTER — HOSPITAL ENCOUNTER (OUTPATIENT)
Facility: MEDICAL CENTER | Age: 69
End: 2019-08-13
Payer: COMMERCIAL

## 2019-08-19 ENCOUNTER — TELEPHONE (OUTPATIENT)
Dept: ONCOLOGY | Age: 69
End: 2019-08-19

## 2019-08-20 ENCOUNTER — TELEPHONE (OUTPATIENT)
Dept: ONCOLOGY | Age: 69
End: 2019-08-20

## 2019-08-27 ENCOUNTER — TELEPHONE (OUTPATIENT)
Dept: ONCOLOGY | Age: 69
End: 2019-08-27

## 2019-09-10 RX ORDER — GABAPENTIN 300 MG/1
CAPSULE ORAL
Qty: 360 CAPSULE | Refills: 0 | Status: SHIPPED | OUTPATIENT
Start: 2019-09-10 | End: 2020-01-24

## 2019-09-13 ENCOUNTER — OFFICE VISIT (OUTPATIENT)
Dept: FAMILY MEDICINE CLINIC | Age: 69
End: 2019-09-13
Payer: COMMERCIAL

## 2019-09-13 VITALS
DIASTOLIC BLOOD PRESSURE: 60 MMHG | SYSTOLIC BLOOD PRESSURE: 92 MMHG | RESPIRATION RATE: 16 BRPM | HEART RATE: 75 BPM | BODY MASS INDEX: 39.8 KG/M2 | HEIGHT: 61 IN | WEIGHT: 210.8 LBS | OXYGEN SATURATION: 97 %

## 2019-09-13 DIAGNOSIS — G47.00 INSOMNIA, UNSPECIFIED TYPE: ICD-10-CM

## 2019-09-13 DIAGNOSIS — I10 ESSENTIAL HYPERTENSION: ICD-10-CM

## 2019-09-13 DIAGNOSIS — I25.10 CORONARY ARTERY DISEASE INVOLVING NATIVE CORONARY ARTERY OF NATIVE HEART WITHOUT ANGINA PECTORIS: ICD-10-CM

## 2019-09-13 DIAGNOSIS — F33.41 RECURRENT MAJOR DEPRESSIVE DISORDER, IN PARTIAL REMISSION (HCC): ICD-10-CM

## 2019-09-13 DIAGNOSIS — F41.9 ANXIETY AND DEPRESSION: ICD-10-CM

## 2019-09-13 DIAGNOSIS — F32.A ANXIETY AND DEPRESSION: ICD-10-CM

## 2019-09-13 DIAGNOSIS — J44.9 COPD MIXED TYPE (HCC): ICD-10-CM

## 2019-09-13 DIAGNOSIS — E66.9 DIABETES MELLITUS TYPE 2 IN OBESE (HCC): Primary | ICD-10-CM

## 2019-09-13 DIAGNOSIS — D72.829 LEUKOCYTOSIS, UNSPECIFIED TYPE: ICD-10-CM

## 2019-09-13 DIAGNOSIS — E11.69 DIABETES MELLITUS TYPE 2 IN OBESE (HCC): Primary | ICD-10-CM

## 2019-09-13 PROCEDURE — 99214 OFFICE O/P EST MOD 30 MIN: CPT | Performed by: FAMILY MEDICINE

## 2019-09-13 RX ORDER — BISOPROLOL FUMARATE 5 MG/1
2.5 TABLET ORAL DAILY
Qty: 45 TABLET | Refills: 3 | Status: SHIPPED | OUTPATIENT
Start: 2019-09-13 | End: 2020-03-12 | Stop reason: SDUPTHER

## 2019-09-13 RX ORDER — FLUTICASONE PROPIONATE 50 MCG
2 SPRAY, SUSPENSION (ML) NASAL DAILY
Qty: 1 BOTTLE | Refills: 2 | Status: SHIPPED | OUTPATIENT
Start: 2019-09-13

## 2019-09-13 RX ORDER — VENLAFAXINE HYDROCHLORIDE 75 MG/1
75 CAPSULE, EXTENDED RELEASE ORAL DAILY
Qty: 30 CAPSULE | Refills: 11 | Status: SHIPPED | OUTPATIENT
Start: 2019-09-13 | End: 2020-03-12 | Stop reason: SDUPTHER

## 2019-09-13 RX ORDER — TRAZODONE HYDROCHLORIDE 50 MG/1
50 TABLET ORAL NIGHTLY PRN
Qty: 30 TABLET | Refills: 11 | Status: SHIPPED | OUTPATIENT
Start: 2019-09-13 | End: 2020-03-12 | Stop reason: SDUPTHER

## 2019-09-13 RX ORDER — LISINOPRIL 10 MG/1
10 TABLET ORAL DAILY
Qty: 90 TABLET | Refills: 3 | Status: SHIPPED | OUTPATIENT
Start: 2019-09-13 | End: 2020-03-12 | Stop reason: SDUPTHER

## 2019-09-13 RX ORDER — VENLAFAXINE HYDROCHLORIDE 150 MG/1
150 CAPSULE, EXTENDED RELEASE ORAL DAILY
Qty: 30 CAPSULE | Refills: 11 | Status: SHIPPED | OUTPATIENT
Start: 2019-09-13 | End: 2020-03-12 | Stop reason: SDUPTHER

## 2019-09-13 ASSESSMENT — ENCOUNTER SYMPTOMS
SHORTNESS OF BREATH: 0
ABDOMINAL PAIN: 0

## 2019-09-13 NOTE — PROGRESS NOTES
suicidal ideas. The patient is not nervous/anxious. Objective:     BP 92/60   Pulse 75   Resp 16   Ht 5' 1\" (1.549 m)   Wt 210 lb 12.8 oz (95.6 kg)   SpO2 97%   BMI 39.83 kg/m²     Physical Exam   Constitutional: She appears well-developed. HENT:   Head: Normocephalic. Right Ear: Tympanic membrane and external ear normal.   Left Ear: Tympanic membrane and external ear normal.   Mouth/Throat: No oropharyngeal exudate. Eyes: Conjunctivae and EOM are normal.   Neck: Normal range of motion. Cardiovascular: Normal heart sounds. No murmur heard. Pulmonary/Chest: Effort normal and breath sounds normal. No respiratory distress. She has no wheezes. Abdominal: Soft. She exhibits no distension. There is no tenderness. There is no guarding. Lymphadenopathy:     She has no cervical adenopathy. Neurological: She is alert. Skin: She is not diaphoretic. Psychiatric: She has a normal mood and affect. Her behavior is normal. Judgment and thought content normal.       Assessment & Plan:      1. Diabetes mellitus type 2 in obese (HCC)  A1c ordered by blood work as we ran out of POC strips today, follow-up in 3 months for A1c recheck, discussed importance of checking sugars intermittently to monitor for hypoglycemia  - Semaglutide (OZEMPIC) 1 MG/DOSE SOPN; Inject 1 mg per week  Dispense: 4 pen; Refill: 11  - Hemoglobin A1C; Future  - Microalbumin, Ur; Future    2. Insomnia, unspecified type  Continue trazodone  - traZODone (DESYREL) 50 MG tablet; Take 1 tablet by mouth nightly as needed for Sleep  Dispense: 30 tablet; Refill: 11    3. Recurrent major depressive disorder, in partial remission (HCC)  Continue Effexor    4. Leukocytosis, unspecified type  Recommended seeing hematologist  - CBC Auto Differential; Future    5. Coronary artery disease involving native coronary artery of native heart without angina pectoris  Highly suggested she sees a cardiologist ASAP    6.  Essential hypertension  Lisinopril and bisoprolol doses reduced as she is having symptomatic hypotension  - lisinopril (PRINIVIL;ZESTRIL) 10 MG tablet; Take 1 tablet by mouth daily  Dispense: 90 tablet; Refill: 3  - bisoprolol (ZEBETA) 5 MG tablet; Take 0.5 tablets by mouth daily  Dispense: 45 tablet; Refill: 3  - Basic Metabolic Panel; Future    7. Anxiety and depression  - venlafaxine (EFFEXOR XR) 75 MG extended release capsule; Take 1 capsule by mouth daily  Dispense: 30 capsule; Refill: 11  - venlafaxine (EFFEXOR XR) 150 MG extended release capsule; Take 1 capsule by mouth daily Take with 75 mg capsule  Dispense: 30 capsule; Refill: 11    8. COPD mixed type (HCC)  - tiotropium (SPIRIVA RESPIMAT) 2.5 MCG/ACT AERS inhaler; Inhale 2 puffs into the lungs daily  Dispense: 1 Inhaler; Refill: 11    Reminders given for [facility]olog[place] DEXA    Follow-up in 3 months    Call or return to clinic prn if these symptoms worsen or fail to improve as anticipated. I have reviewed the instructions with the patient, answering all questions to their satisfaction.     Electronically signed by Moisés Garcia MD on 9/13/2019 at 12:50 PM

## 2019-10-17 RX ORDER — CLOPIDOGREL BISULFATE 75 MG/1
TABLET ORAL
Qty: 30 TABLET | Refills: 3 | Status: SHIPPED | OUTPATIENT
Start: 2019-10-17 | End: 2020-02-14

## 2019-12-19 ENCOUNTER — TELEPHONE (OUTPATIENT)
Dept: FAMILY MEDICINE CLINIC | Age: 69
End: 2019-12-19

## 2020-01-03 ENCOUNTER — OFFICE VISIT (OUTPATIENT)
Dept: FAMILY MEDICINE CLINIC | Age: 70
End: 2020-01-03
Payer: COMMERCIAL

## 2020-01-03 VITALS
TEMPERATURE: 99 F | BODY MASS INDEX: 40.06 KG/M2 | SYSTOLIC BLOOD PRESSURE: 92 MMHG | HEART RATE: 64 BPM | WEIGHT: 212 LBS | DIASTOLIC BLOOD PRESSURE: 62 MMHG | OXYGEN SATURATION: 94 %

## 2020-01-03 LAB
BASOPHILS ABSOLUTE: 0.11 /ΜL
BASOPHILS RELATIVE PERCENT: 0.8 %
BUN BLDV-MCNC: 12 MG/DL
CALCIUM SERPL-MCNC: 9.5 MG/DL
CHLORIDE BLD-SCNC: 102 MMOL/L
CO2: 27 MMOL/L
CREAT SERPL-MCNC: 0.92 MG/DL
EOSINOPHILS ABSOLUTE: 0.14 /ΜL
EOSINOPHILS RELATIVE PERCENT: 1.1 %
GFR CALCULATED: NORMAL
GLUCOSE BLD-MCNC: 151 MG/DL
HBA1C MFR BLD: 6.3 %
HCT VFR BLD CALC: 44.8 % (ref 36–46)
HEMOGLOBIN: 14.1 G/DL (ref 12–16)
INFLUENZA A ANTIBODY: NORMAL
INFLUENZA B ANTIBODY: NORMAL
LYMPHOCYTES ABSOLUTE: 4.03 /ΜL
LYMPHOCYTES RELATIVE PERCENT: 30.7 %
MCH RBC QN AUTO: 29.7 PG
MCHC RBC AUTO-ENTMCNC: 31.5 G/DL
MCV RBC AUTO: 94.5 FL
MONOCYTES ABSOLUTE: 0.99 /ΜL
MONOCYTES RELATIVE PERCENT: 7.5 %
NEUTROPHILS ABSOLUTE: 7.77 /ΜL
NEUTROPHILS RELATIVE PERCENT: 59.1 %
PDW BLD-RTO: 31.5 %
PLATELET # BLD: 447 K/ΜL
PMV BLD AUTO: 0 FL
POTASSIUM SERPL-SCNC: 5.3 MMOL/L
RBC # BLD: 4.74 10^6/ΜL
SODIUM BLD-SCNC: 141 MMOL/L
WBC # BLD: 13.14 10^3/ML

## 2020-01-03 PROCEDURE — 83036 HEMOGLOBIN GLYCOSYLATED A1C: CPT | Performed by: FAMILY MEDICINE

## 2020-01-03 PROCEDURE — 87804 INFLUENZA ASSAY W/OPTIC: CPT | Performed by: FAMILY MEDICINE

## 2020-01-03 PROCEDURE — 99214 OFFICE O/P EST MOD 30 MIN: CPT | Performed by: FAMILY MEDICINE

## 2020-01-03 RX ORDER — GLIMEPIRIDE 2 MG/1
TABLET ORAL
Qty: 180 TABLET | Refills: 2 | Status: SHIPPED | OUTPATIENT
Start: 2020-01-03 | End: 2020-03-12 | Stop reason: SDUPTHER

## 2020-01-03 RX ORDER — LANCETS 30 GAUGE
EACH MISCELLANEOUS
Qty: 200 EACH | Refills: 5 | Status: SHIPPED | OUTPATIENT
Start: 2020-01-03 | End: 2020-03-12 | Stop reason: SDUPTHER

## 2020-01-03 RX ORDER — BUDESONIDE AND FORMOTEROL FUMARATE DIHYDRATE 160; 4.5 UG/1; UG/1
2 AEROSOL RESPIRATORY (INHALATION) 2 TIMES DAILY
Qty: 1 INHALER | Refills: 5 | Status: SHIPPED | OUTPATIENT
Start: 2020-01-03 | End: 2020-03-12 | Stop reason: SDUPTHER

## 2020-01-03 RX ORDER — GLIMEPIRIDE 4 MG/1
TABLET ORAL
Qty: 180 TABLET | Refills: 2 | Status: SHIPPED | OUTPATIENT
Start: 2020-01-03 | End: 2020-01-03 | Stop reason: SDUPTHER

## 2020-01-03 RX ORDER — ATORVASTATIN CALCIUM 80 MG/1
80 TABLET, FILM COATED ORAL DAILY
Qty: 90 TABLET | Refills: 3 | Status: SHIPPED | OUTPATIENT
Start: 2020-01-03 | End: 2020-03-12 | Stop reason: SDUPTHER

## 2020-01-03 ASSESSMENT — ENCOUNTER SYMPTOMS
SHORTNESS OF BREATH: 0
BLOOD IN STOOL: 0
EYE PAIN: 0

## 2020-01-03 NOTE — PROGRESS NOTES
Brandyn Johnson MD  [address] MEDICINE  [facility] [address] [facility] [address]  Dept: [phone]    Narayan Mcclelland is a 81 y.o. female who presents today for hermedical conditions/complaints as noted below.   Narayan Mcclelland is here today c/o 3 Month Follow-Up       HPI:     HPI    Seen today with her male partner    T2DM, A1C 6.3 today, on Ozempic, Amaryl   No hypoglycemia symptoms, she does not check her sugars at home  Last eye exam: March 2019    Depression, on Effexor, trazodone, mood well controlled, wants to continue her current medications, no suicidal thoughts  CAD, referred to cardiology but did not go, lisinopril and bisoprolol dose reduced at last visit due to symptomatic hypotension, blood pressure continues to be low, she no longer feels as dizzy  COPD, on Spiriva, not taking Breo ellipta as she does not like the taste, would like an alternative inhaler sent over  WBC, platelets elevated on repeat check, referral made to hematology but she chose not to go, needs repeat CBC  Hematuria on urine dip, she continues to decline additional investigations  Exposed to the flu when she was with her grandkids a week ago, would like flu testing done, she is asymptomatic  Needs to complete DEXA, Cologuard, mammogram    Wt Readings from Last 3 Encounters:   01/03/20 212 lb (96.2 kg)   09/13/19 210 lb 12.8 oz (95.6 kg)   06/13/19 220 lb 12.8 oz (100.2 kg)       Patient Active Problem List   Diagnosis    Essential hypertension    Diabetes mellitus type 2 in obese ([address]    Hx of congestive heart failure    COPD mixed type ([address]    Morbid obesity ([address]    Chronic midline low back pain with bilateral sciatica    Diabetic peripheral neuropathy ([address]    Dyslipidemia    Coronary artery disease involving native coronary artery of native heart without angina pectoris    SAGAR (obstructive sleep apnea)    Elevated WBC count    Insomnia    Anxiety and twice a day before meals  Dispense: 180 tablet; Refill: 2  - Microalbumin, Ur; Future    2. Exposure to the flu  Flu swab negative  - POCT Influenza A/B    3. Coronary artery disease involving native coronary artery of native heart without angina pectoris  Needs to see cardiology, continue bisoprolol and lisinopril for now  - AFL - Bard Chepe MD, Cardiology, [place]    4. COPD mixed type ([address]  Jeimy Lewis switched to Symbicort on her request  - budesonide-formoterol (SYMBICORT) 160-4.5 MCG/ACT AERO; Inhale 2 puffs into the lungs 2 times daily  Dispense: 1 Inhaler; Refill: 5    5. Screen for colon cancer  Reminder given to complete Cologuard    6. Screening for breast cancer  Mammogram order reprinted    7. Screening for osteoporosis  DEXA order reprinted    8. Dyslipidemia  - atorvastatin (LIPITOR) 80 MG tablet; Take 1 tablet by mouth daily  Dispense: 90 tablet; Refill: 3    9. Anxiety and depression  Mood well controlled, continue current medications    Order for CBC, BMP reprinted  Follow-up in 3 months for Medicare wellness visit    Call or return to clinic prn if these symptoms worsen or fail to improve as anticipated. I have reviewed the instructions with the patient, answering all questions to their satisfaction.     Electronically signed by Chris Rashid MD on 1/3/2020 at 3:33 PM

## 2020-01-24 RX ORDER — GABAPENTIN 300 MG/1
CAPSULE ORAL
Qty: 360 CAPSULE | Refills: 2 | Status: SHIPPED | OUTPATIENT
Start: 2020-01-24 | End: 2020-03-12 | Stop reason: SDUPTHER

## 2020-02-14 RX ORDER — RANOLAZINE 500 MG/1
500 TABLET, EXTENDED RELEASE ORAL 2 TIMES DAILY
Qty: 60 TABLET | Refills: 3 | Status: SHIPPED | OUTPATIENT
Start: 2020-02-14 | End: 2020-03-12 | Stop reason: SDUPTHER

## 2020-02-14 RX ORDER — CLOPIDOGREL BISULFATE 75 MG/1
TABLET ORAL
Qty: 30 TABLET | Refills: 3 | Status: SHIPPED | OUTPATIENT
Start: 2020-02-14 | End: 2020-03-12 | Stop reason: SDUPTHER

## 2020-02-14 NOTE — TELEPHONE ENCOUNTER
Laquita Aguilar is calling to request a refill on the following medication(s):    Last Visit Date (If Applicable):  8/0/7344    Next Visit Date:    4/3/2020    Medication Request:  Requested Prescriptions     Pending Prescriptions Disp Refills    clopidogrel (PLAVIX) 75 MG tablet [Pharmacy Med Name: CLOPIDOGREL 75 MG TABLET] 30 tablet 3     Sig: take 1 tablet by mouth once daily

## 2020-02-14 NOTE — TELEPHONE ENCOUNTER
Narayan Gave is calling to request a refill on the following medication(s):    Last Visit Date (If Applicable):  0/7/1802    Next Visit Date:    4/3/2020    Medication Request:  Requested Prescriptions     Pending Prescriptions Disp Refills    ranolazine (RANEXA) 500 MG extended release tablet 60 tablet 3     Sig: Take 1 tablet by mouth 2 times daily

## 2020-03-12 RX ORDER — LANCETS 30 GAUGE
EACH MISCELLANEOUS
Qty: 200 EACH | Refills: 5 | Status: SHIPPED | OUTPATIENT
Start: 2020-03-12 | End: 2020-10-03

## 2020-03-12 RX ORDER — BUDESONIDE AND FORMOTEROL FUMARATE DIHYDRATE 160; 4.5 UG/1; UG/1
2 AEROSOL RESPIRATORY (INHALATION) 2 TIMES DAILY
Qty: 1 INHALER | Refills: 5 | Status: SHIPPED | OUTPATIENT
Start: 2020-03-12 | End: 2020-09-30 | Stop reason: SDUPTHER

## 2020-03-12 RX ORDER — GABAPENTIN 300 MG/1
CAPSULE ORAL
Qty: 360 CAPSULE | Refills: 1 | Status: SHIPPED | OUTPATIENT
Start: 2020-03-12 | End: 2020-09-30 | Stop reason: SDUPTHER

## 2020-03-12 RX ORDER — TRAZODONE HYDROCHLORIDE 50 MG/1
50 TABLET ORAL NIGHTLY PRN
Qty: 30 TABLET | Refills: 5 | Status: SHIPPED | OUTPATIENT
Start: 2020-03-12 | End: 2020-08-19 | Stop reason: CLARIF

## 2020-03-12 RX ORDER — L. ACIDOPHILUS/BIFIDO. LONGUM 15 MG
CAPSULE,DELAYED RELEASE (ENTERIC COATED) ORAL
Qty: 200 STRIP | Refills: 5 | Status: SHIPPED | OUTPATIENT
Start: 2020-03-12 | End: 2020-10-03

## 2020-03-12 RX ORDER — ALBUTEROL SULFATE 90 UG/1
2 AEROSOL, METERED RESPIRATORY (INHALATION) 4 TIMES DAILY PRN
Qty: 1 INHALER | Refills: 5 | Status: SHIPPED | OUTPATIENT
Start: 2020-03-12 | End: 2020-04-22

## 2020-03-12 RX ORDER — RANOLAZINE 500 MG/1
500 TABLET, EXTENDED RELEASE ORAL 2 TIMES DAILY
Qty: 60 TABLET | Refills: 5 | Status: SHIPPED | OUTPATIENT
Start: 2020-03-12 | End: 2020-06-15 | Stop reason: SDUPTHER

## 2020-03-12 RX ORDER — OMEPRAZOLE 40 MG/1
CAPSULE, DELAYED RELEASE ORAL
Qty: 90 CAPSULE | Refills: 1 | Status: SHIPPED | OUTPATIENT
Start: 2020-03-12 | End: 2020-03-13

## 2020-03-12 RX ORDER — VENLAFAXINE HYDROCHLORIDE 150 MG/1
150 CAPSULE, EXTENDED RELEASE ORAL DAILY
Qty: 30 CAPSULE | Refills: 5 | Status: SHIPPED | OUTPATIENT
Start: 2020-03-12 | End: 2020-09-30 | Stop reason: SDUPTHER

## 2020-03-12 RX ORDER — CLOPIDOGREL BISULFATE 75 MG/1
TABLET ORAL
Qty: 30 TABLET | Refills: 5 | Status: SHIPPED | OUTPATIENT
Start: 2020-03-12 | End: 2020-06-15 | Stop reason: SDUPTHER

## 2020-03-12 RX ORDER — GLIMEPIRIDE 2 MG/1
TABLET ORAL
Qty: 180 TABLET | Refills: 1 | Status: SHIPPED | OUTPATIENT
Start: 2020-03-12 | End: 2020-09-30 | Stop reason: SDUPTHER

## 2020-03-12 RX ORDER — BISOPROLOL FUMARATE 5 MG/1
2.5 TABLET ORAL DAILY
Qty: 45 TABLET | Refills: 1 | Status: SHIPPED | OUTPATIENT
Start: 2020-03-12 | End: 2020-04-17

## 2020-03-12 RX ORDER — ATORVASTATIN CALCIUM 80 MG/1
80 TABLET, FILM COATED ORAL DAILY
Qty: 90 TABLET | Refills: 1 | Status: SHIPPED | OUTPATIENT
Start: 2020-03-12 | End: 2020-09-30 | Stop reason: SDUPTHER

## 2020-03-12 RX ORDER — VENLAFAXINE HYDROCHLORIDE 75 MG/1
75 CAPSULE, EXTENDED RELEASE ORAL DAILY
Qty: 30 CAPSULE | Refills: 5 | Status: SHIPPED | OUTPATIENT
Start: 2020-03-12 | End: 2020-09-30 | Stop reason: SDUPTHER

## 2020-03-12 RX ORDER — LISINOPRIL 10 MG/1
10 TABLET ORAL DAILY
Qty: 90 TABLET | Refills: 1 | Status: SHIPPED | OUTPATIENT
Start: 2020-03-12 | End: 2020-08-25

## 2020-03-13 RX ORDER — OMEPRAZOLE 40 MG/1
CAPSULE, DELAYED RELEASE ORAL
Qty: 90 CAPSULE | Refills: 1 | Status: SHIPPED | OUTPATIENT
Start: 2020-03-13 | End: 2020-09-30 | Stop reason: SDUPTHER

## 2020-03-16 RX ORDER — GLIMEPIRIDE 4 MG/1
TABLET ORAL
COMMUNITY
Start: 2020-01-03 | End: 2020-08-19 | Stop reason: CLARIF

## 2020-03-17 RX ORDER — RANOLAZINE 500 MG/1
500 TABLET, EXTENDED RELEASE ORAL 2 TIMES DAILY
Qty: 60 TABLET | Refills: 5 | OUTPATIENT
Start: 2020-03-17

## 2020-03-17 RX ORDER — BUDESONIDE AND FORMOTEROL FUMARATE DIHYDRATE 160; 4.5 UG/1; UG/1
2 AEROSOL RESPIRATORY (INHALATION) 2 TIMES DAILY
Qty: 1 INHALER | Refills: 5 | OUTPATIENT
Start: 2020-03-17

## 2020-03-17 RX ORDER — TRAZODONE HYDROCHLORIDE 50 MG/1
50 TABLET ORAL NIGHTLY PRN
Qty: 30 TABLET | Refills: 5 | OUTPATIENT
Start: 2020-03-17

## 2020-03-17 RX ORDER — OMEPRAZOLE 40 MG/1
CAPSULE, DELAYED RELEASE ORAL
Qty: 90 CAPSULE | Refills: 1 | OUTPATIENT
Start: 2020-03-17

## 2020-03-17 RX ORDER — ALBUTEROL SULFATE 90 UG/1
2 AEROSOL, METERED RESPIRATORY (INHALATION) 4 TIMES DAILY PRN
Qty: 1 INHALER | Refills: 5 | OUTPATIENT
Start: 2020-03-17

## 2020-03-17 RX ORDER — ATORVASTATIN CALCIUM 80 MG/1
80 TABLET, FILM COATED ORAL DAILY
Qty: 90 TABLET | Refills: 1 | OUTPATIENT
Start: 2020-03-17

## 2020-03-17 RX ORDER — LISINOPRIL 10 MG/1
10 TABLET ORAL DAILY
Qty: 90 TABLET | Refills: 1 | OUTPATIENT
Start: 2020-03-17

## 2020-03-17 RX ORDER — CLOPIDOGREL BISULFATE 75 MG/1
TABLET ORAL
Qty: 30 TABLET | Refills: 5 | OUTPATIENT
Start: 2020-03-17

## 2020-03-17 RX ORDER — VENLAFAXINE HYDROCHLORIDE 75 MG/1
75 CAPSULE, EXTENDED RELEASE ORAL DAILY
Qty: 30 CAPSULE | Refills: 5 | OUTPATIENT
Start: 2020-03-17

## 2020-03-17 RX ORDER — BISOPROLOL FUMARATE 5 MG/1
2.5 TABLET ORAL DAILY
Qty: 45 TABLET | Refills: 1 | OUTPATIENT
Start: 2020-03-17

## 2020-03-17 RX ORDER — VENLAFAXINE HYDROCHLORIDE 150 MG/1
150 CAPSULE, EXTENDED RELEASE ORAL DAILY
Qty: 30 CAPSULE | Refills: 5 | OUTPATIENT
Start: 2020-03-17

## 2020-03-17 RX ORDER — L. ACIDOPHILUS/BIFIDO. LONGUM 15 MG
CAPSULE,DELAYED RELEASE (ENTERIC COATED) ORAL
Qty: 200 STRIP | Refills: 5 | OUTPATIENT
Start: 2020-03-17

## 2020-03-17 RX ORDER — LANCETS 30 GAUGE
EACH MISCELLANEOUS
Qty: 200 EACH | Refills: 5 | OUTPATIENT
Start: 2020-03-17

## 2020-03-17 RX ORDER — GABAPENTIN 300 MG/1
CAPSULE ORAL
Qty: 360 CAPSULE | Refills: 1 | OUTPATIENT
Start: 2020-03-17 | End: 2020-06-15

## 2020-03-17 RX ORDER — GLIMEPIRIDE 2 MG/1
TABLET ORAL
Qty: 180 TABLET | Refills: 1 | OUTPATIENT
Start: 2020-03-17

## 2020-06-15 RX ORDER — RANOLAZINE 500 MG/1
500 TABLET, EXTENDED RELEASE ORAL 2 TIMES DAILY
Qty: 60 TABLET | Refills: 1 | Status: SHIPPED | OUTPATIENT
Start: 2020-06-15 | End: 2020-08-19 | Stop reason: SDUPTHER

## 2020-06-15 RX ORDER — CLOPIDOGREL BISULFATE 75 MG/1
TABLET ORAL
Qty: 30 TABLET | Refills: 1 | Status: SHIPPED | OUTPATIENT
Start: 2020-06-15 | End: 2020-08-07 | Stop reason: SDUPTHER

## 2020-08-03 RX ORDER — BISOPROLOL FUMARATE 5 MG/1
TABLET ORAL
Qty: 90 TABLET | Refills: 0 | Status: SHIPPED | OUTPATIENT
Start: 2020-08-03 | End: 2020-09-30 | Stop reason: SDUPTHER

## 2020-08-03 NOTE — TELEPHONE ENCOUNTER
Pharm requested refill    Health Maintenance   Topic Date Due    Diabetic retinal exam  04/29/1960    Breast cancer screen  04/29/2002    Colon cancer screen colonoscopy  04/29/2002    DEXA (modify frequency per FRAX score)  04/29/2007    Annual Wellness Visit (AWV)  05/29/2019    Diabetic foot exam  04/02/2020    Lipid screen  04/02/2020    Diabetic microalbuminuria test  04/09/2020    Flu vaccine (1) 09/01/2020    A1C test (Diabetic or Prediabetic)  01/03/2021    Potassium monitoring  01/03/2021    Creatinine monitoring  01/03/2021    Pneumococcal 65+ years Vaccine (2 of 2 - PPSV23) 02/01/2021    DTaP/Tdap/Td vaccine (3 - Td) 09/13/2029    Shingles Vaccine  Completed    Hepatitis C screen  Completed    Hepatitis A vaccine  Aged Out    Hib vaccine  Aged Out    Meningococcal (ACWY) vaccine  Aged Out             (applicable per patient's age: Cancer Screenings, Depression Screening, Fall Risk Screening, Immunizations)    Hemoglobin A1C (%)   Date Value   01/03/2020 6.3   04/02/2019 7.4 (H)   02/19/2019 8.8     LDL Cholesterol (mg/dL)   Date Value   04/02/2019 59     AST (U/L)   Date Value   04/02/2019 15     ALT (U/L)   Date Value   04/02/2019 11     BUN (mg/dL)   Date Value   01/03/2020 12      (goal A1C is < 7)   (goal LDL is <100) need 30-50% reduction from baseline     BP Readings from Last 3 Encounters:   01/03/20 92/62   09/13/19 92/60   06/13/19 103/66    (goal /80)      All Future Testing planned in [facility]:  Lab Frequency Next Occurrence   Microalbumin, Ur Once 02/03/2020   Potassium Once 02/07/2020       Next Visit Date:  Future Appointments   Date Time Provider Chris Elaine   8/19/2020  3:30 PM Jeraline Patrice, APRN - [facility]            Patient Active Problem List:     Essential hypertension     Diabetes mellitus type 2 in obese ([address]     Hx of congestive heart failure     COPD mixed type ([address]     Morbid obesity ([address]     Chronic midline low back pain with bilateral sciatica     Diabetic peripheral neuropathy (HCC)     Dyslipidemia     Coronary artery disease involving native coronary artery of native heart without angina pectoris     SAGAR (obstructive sleep apnea)     Elevated WBC count     Insomnia     Anxiety and depression     Hematuria

## 2020-08-07 RX ORDER — CLOPIDOGREL BISULFATE 75 MG/1
TABLET ORAL
Qty: 30 TABLET | Refills: 0 | Status: SHIPPED | OUTPATIENT
Start: 2020-08-07 | End: 2020-08-28 | Stop reason: SDUPTHER

## 2020-08-07 NOTE — TELEPHONE ENCOUNTER
Dayo Viveros is calling to request a refill on the following medication(s):    Last Visit Date (If Applicable):  9/27/9081    Next Visit Date:    8/19/2020    Medication Request:  Requested Prescriptions     Pending Prescriptions Disp Refills    clopidogrel (PLAVIX) 75 MG tablet 30 tablet 1     Sig: take 1 tablet by mouth once daily

## 2020-08-19 ENCOUNTER — OFFICE VISIT (OUTPATIENT)
Dept: FAMILY MEDICINE CLINIC | Age: 70
End: 2020-08-19
Payer: COMMERCIAL

## 2020-08-19 VITALS
BODY MASS INDEX: 37.91 KG/M2 | WEIGHT: 206 LBS | HEART RATE: 89 BPM | HEIGHT: 62 IN | TEMPERATURE: 98.2 F | OXYGEN SATURATION: 98 % | SYSTOLIC BLOOD PRESSURE: 120 MMHG | DIASTOLIC BLOOD PRESSURE: 80 MMHG

## 2020-08-19 PROBLEM — F33.41 RECURRENT MAJOR DEPRESSIVE DISORDER, IN PARTIAL REMISSION (HCC): Status: ACTIVE | Noted: 2020-08-19

## 2020-08-19 LAB — HBA1C MFR BLD: 6.3 %

## 2020-08-19 PROCEDURE — 99204 OFFICE O/P NEW MOD 45 MIN: CPT | Performed by: NURSE PRACTITIONER

## 2020-08-19 PROCEDURE — 83036 HEMOGLOBIN GLYCOSYLATED A1C: CPT | Performed by: NURSE PRACTITIONER

## 2020-08-19 RX ORDER — RANOLAZINE 500 MG/1
500 TABLET, EXTENDED RELEASE ORAL 2 TIMES DAILY
Qty: 60 TABLET | Refills: 5 | Status: SHIPPED | OUTPATIENT
Start: 2020-08-19 | End: 2020-09-30 | Stop reason: SDUPTHER

## 2020-08-19 SDOH — HEALTH STABILITY: MENTAL HEALTH: HOW OFTEN DO YOU HAVE A DRINK CONTAINING ALCOHOL?: 2-4 TIMES A MONTH

## 2020-08-19 NOTE — PROGRESS NOTES
[facility]  [facility]  [address]  [facility] [address]  Dept: [phone]    8/19/2020    CHIEF COMPLAINT    Chief Complaint   Patient presents with    Establish Care     HPI    Sallie Carmona is a 78 y.o. female who presents   Chief Complaint   Patient presents with   Marcie Alcala Establish Care     Here to establish care. Previous patient of Dr. Efrain Troncoso. COPD- taking Symbicort twice daily and taking albuterol as needed. Reports started smoking again in May after having quit for 20 years    Fall after starting Symbicort- she said \"she wasn't used to it\". She said that she had left side occipital area. She hit her head on the cupboard . Denies s/s of concussion. Fall occurred roughly 2 months ago    Hx of MI in 1999 & CHF. She takes Renexa for angina. She has previously seen cardiology, but when her's stopped practicing she stopped going when she didn't care for his partner. She was referred to Dr. Rell Mueller from Dr. Efrain Troncoso, patient has not gone to cardiology    Diabetes- on Ozempic, Amaryl   No hypoglycemia symptoms, she does not check her sugars at home  Reports metformin gave her diarrhea. Previous tolerance of Januvia, but this was discontinued and replaced with Ozempic     Hypertension-lisinopril and bisoprolol both reduced by previous PCP due to dizziness and hypotension. Dizziness has since resolved      Vitals:    08/19/20 1528   BP: 120/80   Site: Right Lower Arm   Position: Sitting   Cuff Size: Medium Adult   Pulse: 89   Temp: 98.2 °F (36.8 °C)   TempSrc: Temporal   SpO2: 98%   Weight: 206 lb (93.4 kg)   Height: 5' 2\" (1.575 m)       Wt Readings from Last 3 Encounters:   08/19/20 206 lb (93.4 kg)   01/03/20 212 lb (96.2 kg)   09/13/19 210 lb 12.8 oz (95.6 kg)     BP Readings from Last 3 Encounters:   08/19/20 120/80   01/03/20 92/62   09/13/19 92/60       REVIEW OF SYSTEMS    Review of Systems   Constitutional: Negative.   Negative for chills and Right eye: No discharge. Left eye: No discharge. Pupils: Pupils are equal.   Neck:      Musculoskeletal: Normal range of motion. Cardiovascular:      Rate and Rhythm: Normal rate and regular rhythm. Pulses: Normal pulses. Radial pulses are 2+ on the right side and 2+ on the left side. Heart sounds: Normal heart sounds, S1 normal and S2 normal. No murmur. Pulmonary:      Effort: Pulmonary effort is normal. No respiratory distress. Breath sounds: Normal breath sounds. No wheezing. Skin:     General: Skin is warm and dry. Neurological:      Mental Status: She is alert and oriented to person, place, and time. Psychiatric:         Behavior: Behavior normal. Behavior is cooperative. ASSESSMENT/PLAN  1. Encounter to establish care      2. Diabetes mellitus type 2 in obese ([address]  3. Diabetic peripheral neuropathy (HCC    -Stable. Continue current medications. - POCT glycosylated hemoglobin (Hb A1C)    Results for orders placed or performed in visit on 08/19/20   POCT glycosylated hemoglobin (Hb A1C)   Result Value Ref Range    Hemoglobin A1C 6.3 %     -  Lab Results   Component Value Date    LABA1C 6.3 08/19/2020    LABA1C 6.3 01/03/2020    LABA1C 7.4 (H) 04/02/2019     Lab Results   Component Value Date     04/02/2019     4. Morbid obesity ([facility]    --Discussed dietary and activity modifications to assist in weight loss. 5. Essential hypertension    -Chronic. Stable on current medications. 6. Hx of congestive heart failure  7. History of heart attack  8. Coronary artery disease involving native coronary artery of native heart without angina pectoris    - ranolazine (RANEXA) 500 MG extended release tablet; Take 1 tablet by mouth 2 times daily  Dispense: 60 tablet; Refill: 5  -Strongly encouraged to see cardiology given cardiac history.     9. Recurrent major depressive disorder, in partial remission (HCC)    -Chronic, stable, continue current 04/02/2019    (goal LDL reduction with dx if diabetes is 50% LDL reduction)    PHQ Scores 2/19/2019   PHQ2 Score 6   PHQ9 Score 6     Interpretation of Total Score Depression Severity: 1-4 = Minimal depression, 5-9 = Mild depression, 10-14 = Moderate depression, 15-19 = Moderately severe depression, 20-27 = Severe depression       Return for Physical, AWV.     (Please note that portions of this note were completed with a voice recognition program. Efforts were made to edit the dictations but occasionally words are mis-transcribed.)      Electronically signed by BENNETT Sanchez CNP on 8/19/20 at 3:44 PM EDT

## 2020-08-20 ASSESSMENT — ENCOUNTER SYMPTOMS
DIARRHEA: 0
COUGH: 1
NAUSEA: 0
ABDOMINAL PAIN: 0
BACK PAIN: 0
VOMITING: 0
CONSTIPATION: 0
EYES NEGATIVE: 1
SHORTNESS OF BREATH: 0
ALLERGIC/IMMUNOLOGIC NEGATIVE: 1
GASTROINTESTINAL NEGATIVE: 1

## 2020-08-25 RX ORDER — LISINOPRIL 10 MG/1
TABLET ORAL
Qty: 90 TABLET | Refills: 1 | Status: SHIPPED | OUTPATIENT
Start: 2020-08-25 | End: 2020-09-30 | Stop reason: SDUPTHER

## 2020-08-28 RX ORDER — CLOPIDOGREL BISULFATE 75 MG/1
TABLET ORAL
Qty: 30 TABLET | Refills: 5 | Status: SHIPPED | OUTPATIENT
Start: 2020-08-28 | End: 2020-09-30 | Stop reason: SDUPTHER

## 2020-08-28 NOTE — TELEPHONE ENCOUNTER
Delio Puri is calling to request a refill on the following medication(s):    Last Visit Date (If Applicable):  8/96/0310    Next Visit Date:    9/30/2020    Medication Request:  Requested Prescriptions     Pending Prescriptions Disp Refills    clopidogrel (PLAVIX) 75 MG tablet 30 tablet 0     Sig: take 1 tablet by mouth once daily

## 2020-09-04 ENCOUNTER — TELEPHONE (OUTPATIENT)
Dept: FAMILY MEDICINE CLINIC | Age: 70
End: 2020-09-04

## 2020-09-04 RX ORDER — VARENICLINE TARTRATE 1 MG/1
1 TABLET, FILM COATED ORAL 2 TIMES DAILY
Qty: 60 TABLET | Refills: 3 | Status: SHIPPED | OUTPATIENT
Start: 2020-09-04 | End: 2020-11-04 | Stop reason: SINTOL

## 2020-09-04 RX ORDER — VARENICLINE TARTRATE
KIT
Qty: 1 BOX | Refills: 0 | Status: SHIPPED | OUTPATIENT
Start: 2020-09-04 | End: 2020-11-04 | Stop reason: ALTCHOICE

## 2020-09-04 NOTE — TELEPHONE ENCOUNTER
Pt says she spoke with you about wanting to stop smoking she is asking to have something called in whatever you recommend.   Pharm [facility] / [address] [place]

## 2020-09-04 NOTE — TELEPHONE ENCOUNTER
Prescription sent for Chantix. Suggestion is that you cannot quitting smoking for the starter month pack and are a non-smoker by the continuation month pack. Most common side effect is vivid dreams.   Please call patient to inform

## 2020-09-30 ENCOUNTER — OFFICE VISIT (OUTPATIENT)
Dept: FAMILY MEDICINE CLINIC | Age: 70
End: 2020-09-30
Payer: COMMERCIAL

## 2020-09-30 VITALS
TEMPERATURE: 97.3 F | WEIGHT: 209.2 LBS | BODY MASS INDEX: 38.5 KG/M2 | DIASTOLIC BLOOD PRESSURE: 78 MMHG | OXYGEN SATURATION: 93 % | HEART RATE: 77 BPM | HEIGHT: 62 IN | SYSTOLIC BLOOD PRESSURE: 126 MMHG

## 2020-09-30 PROCEDURE — G0438 PPPS, INITIAL VISIT: HCPCS | Performed by: NURSE PRACTITIONER

## 2020-09-30 RX ORDER — ALBUTEROL SULFATE 90 UG/1
2 AEROSOL, METERED RESPIRATORY (INHALATION) EVERY 6 HOURS PRN
Qty: 18 G | Refills: 3 | Status: SHIPPED | OUTPATIENT
Start: 2020-09-30 | End: 2021-02-26 | Stop reason: SDUPTHER

## 2020-09-30 RX ORDER — RANOLAZINE 500 MG/1
500 TABLET, EXTENDED RELEASE ORAL 2 TIMES DAILY
Qty: 180 TABLET | Refills: 1 | Status: SHIPPED | OUTPATIENT
Start: 2020-09-30 | End: 2021-03-12 | Stop reason: SDUPTHER

## 2020-09-30 RX ORDER — VENLAFAXINE HYDROCHLORIDE 75 MG/1
75 CAPSULE, EXTENDED RELEASE ORAL DAILY
Qty: 90 CAPSULE | Refills: 1 | Status: SHIPPED | OUTPATIENT
Start: 2020-09-30

## 2020-09-30 RX ORDER — LISINOPRIL 10 MG/1
TABLET ORAL
Qty: 90 TABLET | Refills: 1 | Status: SHIPPED | OUTPATIENT
Start: 2020-09-30 | End: 2021-03-12 | Stop reason: SDUPTHER

## 2020-09-30 RX ORDER — BUDESONIDE AND FORMOTEROL FUMARATE DIHYDRATE 160; 4.5 UG/1; UG/1
2 AEROSOL RESPIRATORY (INHALATION) 2 TIMES DAILY
Qty: 1 INHALER | Refills: 5 | Status: SHIPPED | OUTPATIENT
Start: 2020-09-30 | End: 2021-02-26 | Stop reason: SDUPTHER

## 2020-09-30 RX ORDER — LANOLIN ALCOHOL/MO/W.PET/CERES
5 CREAM (GRAM) TOPICAL DAILY
Status: CANCELLED | OUTPATIENT
Start: 2020-09-30

## 2020-09-30 RX ORDER — CLOPIDOGREL BISULFATE 75 MG/1
TABLET ORAL
Qty: 90 TABLET | Refills: 1 | Status: ON HOLD | OUTPATIENT
Start: 2020-09-30 | End: 2022-06-24 | Stop reason: HOSPADM

## 2020-09-30 RX ORDER — VENLAFAXINE HYDROCHLORIDE 150 MG/1
150 CAPSULE, EXTENDED RELEASE ORAL DAILY
Qty: 90 CAPSULE | Refills: 1 | Status: SHIPPED | OUTPATIENT
Start: 2020-09-30 | End: 2021-02-26 | Stop reason: SDUPTHER

## 2020-09-30 RX ORDER — ATORVASTATIN CALCIUM 80 MG/1
80 TABLET, FILM COATED ORAL DAILY
Qty: 90 TABLET | Refills: 1 | Status: SHIPPED | OUTPATIENT
Start: 2020-09-30

## 2020-09-30 RX ORDER — GABAPENTIN 300 MG/1
CAPSULE ORAL
Qty: 360 CAPSULE | Refills: 1 | Status: SHIPPED | OUTPATIENT
Start: 2020-09-30 | End: 2020-11-04 | Stop reason: SDUPTHER

## 2020-09-30 RX ORDER — SEMAGLUTIDE 1.34 MG/ML
INJECTION, SOLUTION SUBCUTANEOUS
Qty: 4 PEN | Refills: 11 | Status: SHIPPED | OUTPATIENT
Start: 2020-09-30

## 2020-09-30 RX ORDER — OMEPRAZOLE 40 MG/1
CAPSULE, DELAYED RELEASE ORAL
Qty: 90 CAPSULE | Refills: 1 | Status: SHIPPED | OUTPATIENT
Start: 2020-09-30

## 2020-09-30 RX ORDER — ARIPIPRAZOLE 5 MG/1
5 TABLET ORAL DAILY
Qty: 90 TABLET | Refills: 1 | Status: SHIPPED | OUTPATIENT
Start: 2020-09-30 | End: 2021-02-26 | Stop reason: SDUPTHER

## 2020-09-30 RX ORDER — GLIMEPIRIDE 2 MG/1
TABLET ORAL
Qty: 180 TABLET | Refills: 1 | Status: SHIPPED | OUTPATIENT
Start: 2020-09-30

## 2020-09-30 RX ORDER — BISOPROLOL FUMARATE 5 MG/1
TABLET ORAL
Qty: 90 TABLET | Refills: 1 | Status: SHIPPED | OUTPATIENT
Start: 2020-09-30

## 2020-09-30 RX ORDER — M-VIT,TX,IRON,MINS/CALC/FOLIC 27MG-0.4MG
1 TABLET ORAL DAILY
Qty: 90 TABLET | Refills: 1 | Status: SHIPPED | OUTPATIENT
Start: 2020-09-30

## 2020-09-30 ASSESSMENT — PATIENT HEALTH QUESTIONNAIRE - PHQ9
SUM OF ALL RESPONSES TO PHQ QUESTIONS 1-9: 9
8. MOVING OR SPEAKING SO SLOWLY THAT OTHER PEOPLE COULD HAVE NOTICED. OR THE OPPOSITE, BEING SO FIGETY OR RESTLESS THAT YOU HAVE BEEN MOVING AROUND A LOT MORE THAN USUAL: 1
5. POOR APPETITE OR OVEREATING: 1
4. FEELING TIRED OR HAVING LITTLE ENERGY: 0
6. FEELING BAD ABOUT YOURSELF - OR THAT YOU ARE A FAILURE OR HAVE LET YOURSELF OR YOUR FAMILY DOWN: 2
1. LITTLE INTEREST OR PLEASURE IN DOING THINGS: 2
7. TROUBLE CONCENTRATING ON THINGS, SUCH AS READING THE NEWSPAPER OR WATCHING TELEVISION: 0
10. IF YOU CHECKED OFF ANY PROBLEMS, HOW DIFFICULT HAVE THESE PROBLEMS MADE IT FOR YOU TO DO YOUR WORK, TAKE CARE OF THINGS AT HOME, OR GET ALONG WITH OTHER PEOPLE: 1
9. THOUGHTS THAT YOU WOULD BE BETTER OFF DEAD, OR OF HURTING YOURSELF: 1
2. FEELING DOWN, DEPRESSED OR HOPELESS: 2
SUM OF ALL RESPONSES TO PHQ QUESTIONS 1-9: 9
3. TROUBLE FALLING OR STAYING ASLEEP: 0
SUM OF ALL RESPONSES TO PHQ9 QUESTIONS 1 & 2: 4

## 2020-09-30 ASSESSMENT — LIFESTYLE VARIABLES
HAVE YOU OR SOMEONE ELSE BEEN INJURED AS A RESULT OF YOUR DRINKING: 0
HOW OFTEN DURING THE LAST YEAR HAVE YOU HAD A FEELING OF GUILT OR REMORSE AFTER DRINKING: 0
HOW OFTEN DURING THE LAST YEAR HAVE YOU FOUND THAT YOU WERE NOT ABLE TO STOP DRINKING ONCE YOU HAD STARTED: 0
HAS A RELATIVE, FRIEND, DOCTOR, OR ANOTHER HEALTH PROFESSIONAL EXPRESSED CONCERN ABOUT YOUR DRINKING OR SUGGESTED YOU CUT DOWN: 0
AUDIT-C TOTAL SCORE: 2
HOW MANY STANDARD DRINKS CONTAINING ALCOHOL DO YOU HAVE ON A TYPICAL DAY: 0
HOW OFTEN DURING THE LAST YEAR HAVE YOU FAILED TO DO WHAT WAS NORMALLY EXPECTED FROM YOU BECAUSE OF DRINKING: 0
AUDIT TOTAL SCORE: 2
HOW OFTEN DO YOU HAVE A DRINK CONTAINING ALCOHOL: 2
HOW OFTEN DO YOU HAVE SIX OR MORE DRINKS ON ONE OCCASION: 0
HOW OFTEN DURING THE LAST YEAR HAVE YOU NEEDED AN ALCOHOLIC DRINK FIRST THING IN THE MORNING TO GET YOURSELF GOING AFTER A NIGHT OF HEAVY DRINKING: 0
HOW OFTEN DURING THE LAST YEAR HAVE YOU BEEN UNABLE TO REMEMBER WHAT HAPPENED THE NIGHT BEFORE BECAUSE YOU HAD BEEN DRINKING: 0

## 2020-09-30 ASSESSMENT — COLUMBIA-SUICIDE SEVERITY RATING SCALE - C-SSRS
1. WITHIN THE PAST MONTH, HAVE YOU WISHED YOU WERE DEAD OR WISHED YOU COULD GO TO SLEEP AND NOT WAKE UP?: YES
2. HAVE YOU ACTUALLY HAD ANY THOUGHTS OF KILLING YOURSELF?: NO
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
## 2020-09-30 NOTE — PATIENT INSTRUCTIONS
Learning About Medical Power of   What is a medical power of ? A medical power of , also called a durable power of  for health care, is one type of the legal forms called advance directives. It lets you name the person you want to make treatment decisions for you if you can't speak or decide for yourself. The person you choose is called your health care agent. This person is also called a health care proxy or health care surrogate. A medical power of  may be called something else in your state. How do you choose a health care agent? Choose your health care agent carefully. This person may or may not be a family member. Talk to the person before you make your final decision. Make sure he or she is comfortable with this responsibility. It's a good idea to choose someone who:  · Is at least 25years old. · Knows you well and understands what makes life meaningful for you. · Understands your Amish and moral values. · Will do what you want, not what he or she wants. · Will be able to make difficult choices at a stressful time. · Will be able to refuse or stop treatment, if that is what you would want, even if you could die. · Will be firm and confident with health professionals if needed. · Will ask questions to get needed information. · Lives near you or agrees to travel to you if needed. Your family may help you make medical decisions while you can still be part of that process. But it's important to choose one person to be your health care agent in case you aren't able to make decisions for yourself. If you don't fill out the legal form and name a health care agent, the decisions your family can make may be limited. A health care agent may be called something else in your state. Who will make decisions for you if you don't have a health care agent? If you don't have a health care agent or a living will, you may not get the care you want. Decisions may be made by family members who disagree about your medical care. Or decisions may be made by a medical professional who doesn't know you well. In some cases, a  makes the decisions. When you name a health care agent, it is very clear who has the power to make health decisions for you. How do you name a health care agent? You name your health care agent on a legal form. This form is usually called a medical power of . Ask your hospital, state bar association, or office on aging where to find these forms. You must sign the form to make it legal. Some states require you to get the form notarized. This means that a person called a  watches you sign the form and then he or she signs the form. Some states also require that two or more witnesses sign the form. Be sure to tell your family members and doctors who your health care agent is. Where can you learn more? Go to https://Tabletize.compepiceweb.AppMesh. org and sign in to your ImpulseSave account. Enter [id number] in the [employer] box to learn more about \"Learning About [address]. \"     If you do not have an account, please click on the \"Sign Up Now\" link. Current as of: December 9, 2019               Content Version: 12.5  © 9833-7378 Healthwise, Incorporated. Care instructions adapted under license by [facility] ([facility]). If you have questions about a medical condition or this instruction, always ask your healthcare professional. [place] [address] any warranty or liability for your use of this information. Learning About Living Camilo Evangelista  What is a living will? A living will, also called a declaration, is a legal form. It tells your family and your doctor your wishes when you can't speak for yourself. It's used by the health professionals who will treat you as you near the end of your life or if you get seriously hurt or ill.   If you put your wishes in writing, your loved ones and others will know what kind of care you want. They won't need to guess. This can ease your mind and be helpful to others. And you can change or cancel your living will at any time. A living will is not the same as an estate or property will. An estate will explains what you want to happen with your money and property after you die. How do you use it? A living will is used to describe the kinds of treatment or life support you want as you near the end of your life or if you get seriously hurt or ill. Keep these facts in mind about living butcher. · Your living will is used only if you can't speak or make decisions for yourself. Most often, one or more doctors must certify that you can't speak or decide for yourself before your living will takes effect. · If you get better and can speak for yourself again, you can accept or refuse any treatment. It doesn't matter what you said in your living will. · Some states may limit your right to refuse treatment in certain cases. For example, you may need to clearly state in your living will that you don't want artificial hydration and nutrition, such as being fed through a tube. Is a living will a legal document? A living will is a legal document. Each state has its own laws about living butcher. And a living will may be called something else in your state. Here are some things to know about living butcher. · You don't need an  to complete a living will. But legal advice can be helpful if your state's laws are unclear. It can also help if your health history is complicated or your family can't agree on what should be in your living will. · You can change your living will at any time. Some people find that their wishes about end-of-life care change as their health changes. If you make big changes to your living will, complete a new form. · If you move to another state, make sure that your living will is legal in the state where you now live.  In most cases, doctors will respect your wishes even if you have a form from a different state. · You might use a universal form that has been approved by many states. This kind of form can sometimes be filled out and stored online. Your digital copy will then be available wherever you have a connection to the internet. The doctors and nurses who need to treat you can find it right away. · Your state may offer an online registry. This is another place where you can store your living will online. · It's a good idea to get your living will notarized. This means using a person called a eelusion to watch two people sign, or witness, your living will. What should you know when you create a living will? Here are some questions to ask yourself as you make your living will:  · Do you know enough about life support methods that might be used? If not, talk to your doctor so you know what might be done if you can't breathe on your own, your heart stops, or you can't swallow. · What things would you still want to be able to do after you receive life-support methods? Would you want to be able to walk? To speak? To eat on your own? To live without the help of machines? · Do you want certain Synagogue practices performed if you become very ill? · If you have a choice, where do you want to be cared for? In your home? At a hospital or nursing home? · If you have a choice at the end of your life, where would you prefer to die? At home? In a hospital or nursing home? Somewhere else? · Would you prefer to be buried or cremated? · Do you want your organs to be donated after you die? What should you do with your living will? · Make sure that your family members and your health care agent have copies of your living will (also called a declaration). · Give your doctor a copy of your living will. Ask him or her to keep it as part of your medical record. If you have more than one doctor, make sure that each one has a copy.   · Put a copy of your Examples of these are:  · Meat, poultry, and fish. · Eggs. · Nuts, such as walnuts, pecans, almonds, and peanuts. · Peanut butter and other nut butters. · Tofu. · Avocado. · [place]. · Non-starchy vegetables like broccoli, cauliflower, green beans, mushrooms, peppers, lettuce, and spinach. · Unsweetened non-dairy milks like almond milk and coconut milk. · Cheese, cottage cheese, and cream cheese. Current as of: August 22, 2019               Content Version: 12.5  © 0751-9637 Healthwise, [employer]. Care instructions adapted under license by [facility] ([facility]). If you have questions about a medical condition or this instruction, always ask your healthcare professional. [address] any warranty or liability for your use of this information. Eating Healthy Foods: Care Instructions  Your Care Instructions     Eating healthy foods can help lower your risk for disease. Healthy food gives you energy and keeps your heart strong, your brain active, your muscles working, and your bones strong. A healthy diet includes a variety of foods from the basic food groups: grains, vegetables, fruits, milk and milk products, and meat and beans. Some people may eat more of their favorite foods from only one food group and, as a result, miss getting the nutrients they need. So, it is important to pay attention not only to what you eat but also to what you are missing from your diet. You can eat a healthy, balanced diet by making a few small changes. Follow-up care is a key part of your treatment and safety. Be sure to make and go to all appointments, and call your doctor if you are having problems. It's also a good idea to know your test results and keep a list of the medicines you take. How can you care for yourself at home? Look at what you eat  · Keep a food diary for a week or two and record everything you eat or drink. Track the number of servings you eat from each food group.   · For a balanced diet every day, eat a variety of:  ? 6 or more ounce-equivalents of grains, such as cereals, breads, crackers, rice, or pasta, every day. An ounce-equivalent is 1 slice of bread, 1 cup of ready-to-eat cereal, or ½ cup of cooked rice, cooked pasta, or cooked cereal.  ? 2½ cups of vegetables, especially:  § Dark-green vegetables such as broccoli and spinach. § Orange vegetables such as carrots and sweet potatoes. § Dry beans (such as langley and kidney beans) and peas (such as lentils). ? 2 cups of fresh, frozen, or canned fruit. A small apple or 1 banana or orange equals 1 cup. ? 3 cups of nonfat or low-fat milk, yogurt, or other milk products. ? 5½ ounces of meat and beans, such as chicken, fish, lean meat, beans, nuts, and seeds. One egg, 1 tablespoon of peanut butter, ½ ounce nuts or seeds, or ¼ cup of cooked beans equals 1 ounce of meat. · Learn how to read food labels for serving sizes and ingredients. Fast-food and convenience-food meals often contain few or no fruits or vegetables. Make sure you eat some fruits and vegetables to make the meal more nutritious. · Look at your food diary. For each food group, add up what you have eaten and then divide the total by the number of days. This will give you an idea of how much you are eating from each food group. See if you can find some ways to change your diet to make it more healthy. Start small  · Do not try to make dramatic changes to your diet all at once. You might feel that you are missing out on your favorite foods and then be more likely to fail. · Start slowly, and gradually change your habits. Try some of the following:  ? Use whole wheat bread instead of white bread. ? Use nonfat or low-fat milk instead of whole milk. ? Eat brown rice instead of white rice, and eat whole wheat pasta instead of white-flour pasta. ? Try low-fat cheeses and low-fat yogurt. ? Add more fruits and vegetables to meals and have them for snacks.   ? Add lettuce, tomato, cucumber, and onion to sandwiches. ? Add fruit to yogurt and cereal.  Enjoy food  · You can still eat your favorite foods. You just may need to eat less of them. If your favorite foods are high in fat, salt, and sugar, limit how often you eat them, but do not cut them out entirely. · Eat a wide variety of foods. Make healthy choices when eating out  · The type of restaurant you choose can help you make healthy choices. Even fast-food chains are now offering more low-fat or healthier choices on the menu. · Choose smaller portions, or take half of your meal home. · When eating out, try:  ? A veggie pizza with a whole wheat crust or grilled chicken (instead of sausage or pepperoni). ? Pasta with roasted vegetables, grilled chicken, or marinara sauce instead of cream sauce. ? A vegetable wrap or grilled chicken wrap. ? Broiled or poached food instead of fried or breaded items. Make healthy choices easy  · Buy packaged, prewashed, ready-to-eat fresh vegetables and fruits, such as baby carrots, salad mixes, and chopped or shredded broccoli and cauliflower. · Buy packaged, presliced fruits, such as melon or pineapple. · Choose 100% fruit or vegetable juice instead of soda. Limit juice intake to 4 to 6 oz (½ to ¾ cup) a day. · Blend low-fat yogurt, fruit juice, and canned or frozen fruit to make a smoothie for breakfast or a snack. Where can you learn more? Go to https://Tarsus Medicalhunter.healthNuiku. org and sign in to your [employer] account. Enter D791 in the [facility] box to learn more about \"Eating Healthy Foods: Care Instructions. \"     If you do not have an account, please click on the \"Sign Up Now\" link. Current as of: August 22, 2019               Content Version: 12.5  © 6137-6610 Healthwise, Incorporated. Care instructions adapted under license by [facility] ([facility]).  If you have questions about a medical condition or this instruction, always ask your healthcare professional. [employer], [facility] disclaims any warranty or liability for your use of this information. What is lung cancer screening? Lung cancer screening is a way in which doctors check the lungs for early signs of cancer in people who have no symptoms of lung cancer. A low-dose CT scan uses much less radiation than a normal CT scan and shows a more detailed image of the lungs than a standard X-ray. The goal of lung cancer screening is to find cancer early, before it has a chance to grow, spread, or cause problems. One large study found that smokers who were screened with low-dose CT scans were less likely to die of lung cancer than those who were screened with standard X-ray. Below is a summary of the things you need to know regarding screening for lung cancer with low-dose computed tomography (LDCT). This is a screening program that involves routine annual screening with LDCT studies of the lung. The LDCTs are done using low-dose radiation that is not thought to increase your cancer risk. If you have other serious medical conditions (other cancers, congestive heart failure) that limit your life expectancy to less than 10 years, you should not undergo lung cancer screening with LDCT. The chance is 20%-60% that the LDCT result will show abnormalities. This would require additional testing which could include repeat imaging or even invasive procedures. Most (about 95%) of \"abnormal\" LDCT results are false in the sense that no lung cancer is ultimately found. Additionally, some (about 10%) of the cancers found would not affect your life expectancy, even if undetected and untreated. If you are still smoking, the single most important thing that you can do to reduce your risk of dying of lung cancer is to quit. For this screening to be covered by Medicare and most other insurers, strict criteria must be met.   If you do not meet these criteria, but still wish to undergo LDCT testing, you will be required to sign a waiver indicating your willingness to pay for the scan. Learning About Benefits From Quitting Smoking  How does quitting smoking make you healthier? If you're thinking about quitting smoking, you may have a few reasons to be smoke-free. Your health may be one of them. · When you quit smoking, you lower your risks for cancer, lung disease, heart attack, stroke, blood vessel disease, and blindness from macular degeneration. · When you're smoke-free, you get sick less often, and you heal faster. You are less likely to get colds, flu, bronchitis, and pneumonia. · As a nonsmoker, you may find that your mood is better and you are less stressed. When and how will you feel healthier? Quitting has real health benefits that start from day 1 of being smoke-free. And the longer you stay smoke-free, the healthier you get and the better you feel. The first hours  · After just 20 minutes, your blood pressure and heart rate go down. That means there's less stress on your heart and blood vessels. · Within 12 hours, the level of carbon monoxide in your blood drops back to normal. That makes room for more oxygen. With more oxygen in your body, you may notice that you have more energy than when you smoked. After 2 weeks  · Your lungs start to work better. · Your risk of heart attack starts to drop. After 1 month  · When your lungs are clear, you cough less and breathe deeper, so it's easier to be active. · Your sense of taste and smell return. That means you can enjoy food more than you have since you started smoking. Over the years  · Over the years, your risks of heart disease, heart attack, and stroke are lower. · After 10 years, your risk of dying from lung cancer is cut by about half. And your risk for many other types of cancer is lower too. How would quitting help others in your life? When you quit smoking, you improve the health of everyone who now breathes in your smoke.   · Their heart, lung, and cancer risks drop, much like yours. · They are sick less. For babies and small children, living smoke-free means they're less likely to have ear infections, pneumonia, and bronchitis. · If you're a woman who is or will be pregnant someday, quitting smoking means a healthier . · Children who are close to you are less likely to become adult smokers. Where can you learn more? Go to https://SnapdealpepicewJ. [facility].Robinhood. org and sign in to your [employer] account. Enter [phone] [id number] [phone] in the [employer] box to learn more about \"Learning About Benefits From Quitting Smoking. \"     If you do not have an account, please click on the \"Sign Up Now\" link. Current as of: 2020               Content Version: 12.5  © 5471-4854 Healthwise, Incorporated. Care instructions adapted under license by [facility] ([facility]). If you have questions about a medical condition or this instruction, always ask your healthcare professional. [place] [address] any warranty or liability for your use of this information. Personalized Preventive Plan for Tania Dear - 2020  Medicare offers a range of preventive health benefits. Some of the tests and screenings are paid in full while other may be subject to a deductible, co-insurance, and/or copay. Some of these benefits include a comprehensive review of your medical history including lifestyle, illnesses that may run in your family, and various assessments and screenings as appropriate. After reviewing your medical record and screening and assessments performed today your provider may have ordered immunizations, labs, imaging, and/or referrals for you. A list of these orders (if applicable) as well as your Preventive Care list are included within your After Visit Summary for your review.     Other Preventive Recommendations:    · A preventive eye exam performed by an eye specialist is recommended every 1-2 years to screen for glaucoma; cataracts, macular degeneration, and other eye disorders. · A preventive dental visit is recommended every 6 months. · Try to get at least 150 minutes of exercise per week or 10,000 steps per day on a pedometer . · Order or download the FREE \"Exercise & Physical Activity: Your Everyday Guide\" from [employer]. Call 7-519.443.5755 or search [employer] online. · You need 4060-4683 mg of calcium and 0662-3347 IU of vitamin D per day. It is possible to meet your calcium requirement with diet alone, but a vitamin D supplement is usually necessary to meet this goal.  · When exposed to the sun, use a sunscreen that protects against both UVA and UVB radiation with an SPF of 30 or greater. Reapply every 2 to 3 hours or after sweating, drying off with a towel, or swimming. · Always wear a seat belt when traveling in a car. Always wear a helmet when riding a bicycle or motorcycle.

## 2020-10-03 RX ORDER — GLUCOSAMINE HCL/CHONDROITIN SU 500-400 MG
CAPSULE ORAL
Qty: 100 STRIP | Refills: 11 | Status: SHIPPED | OUTPATIENT
Start: 2020-10-03

## 2020-10-03 RX ORDER — LANCETS 30 GAUGE
1 EACH MISCELLANEOUS 3 TIMES DAILY
Qty: 100 EACH | Refills: 11 | Status: SHIPPED | OUTPATIENT
Start: 2020-10-03

## 2020-10-03 RX ORDER — BLOOD-GLUCOSE METER
1 KIT MISCELLANEOUS DAILY
Qty: 1 KIT | Refills: 0 | Status: SHIPPED | OUTPATIENT
Start: 2020-10-03

## 2020-10-15 ENCOUNTER — TELEPHONE (OUTPATIENT)
Dept: FAMILY MEDICINE CLINIC | Age: 70
End: 2020-10-15

## 2020-10-15 NOTE — TELEPHONE ENCOUNTER
Change at the first of the year. Can you initiate the prior authorization or do you need me to send a new prescription?

## 2020-10-15 NOTE — TELEPHONE ENCOUNTER
Received a call from the pt's  from [address], P [phone]. Needs us to call Yusra P/A: W ,   Push Provider option. For Spiriva Respimat 2.5 mcg/ACT Inh. Non-formulary. Or change to:   [address]:  [address]

## 2020-11-04 ENCOUNTER — OFFICE VISIT (OUTPATIENT)
Dept: FAMILY MEDICINE CLINIC | Age: 70
End: 2020-11-04
Payer: COMMERCIAL

## 2020-11-04 ENCOUNTER — HOSPITAL ENCOUNTER (OUTPATIENT)
Age: 70
Setting detail: SPECIMEN
Discharge: HOME | End: 2020-11-04
Payer: COMMERCIAL

## 2020-11-04 VITALS
HEIGHT: 60 IN | HEART RATE: 70 BPM | SYSTOLIC BLOOD PRESSURE: 128 MMHG | BODY MASS INDEX: 42.25 KG/M2 | TEMPERATURE: 97.4 F | WEIGHT: 215.2 LBS | OXYGEN SATURATION: 95 % | DIASTOLIC BLOOD PRESSURE: 84 MMHG | RESPIRATION RATE: 17 BRPM

## 2020-11-04 PROBLEM — E78.1 HYPERTRIGLYCERIDEMIA: Status: ACTIVE | Noted: 2020-11-04

## 2020-11-04 PROBLEM — R19.7 DIARRHEA: Status: ACTIVE | Noted: 2020-11-04

## 2020-11-04 PROBLEM — N39.41 URGE INCONTINENCE OF URINE: Status: ACTIVE | Noted: 2020-11-04

## 2020-11-04 LAB
ABSOLUTE EOS #: 0.12 K/UL (ref 0–0.44)
ABSOLUTE IMMATURE GRANULOCYTE: 0.06 K/UL (ref 0–0.3)
ABSOLUTE LYMPH #: 3.31 K/UL (ref 1.1–3.7)
ABSOLUTE MONO #: 0.78 K/UL (ref 0.1–1.2)
BASOPHILS # BLD: 1 % (ref 0–2)
BASOPHILS ABSOLUTE: 0.06 K/UL (ref 0–0.2)
DIFFERENTIAL TYPE: ABNORMAL
EOSINOPHILS RELATIVE PERCENT: 1 % (ref 1–4)
HCT VFR BLD CALC: 42.8 % (ref 36.3–47.1)
HEMOGLOBIN: 13.4 G/DL (ref 11.9–15.1)
IMMATURE GRANULOCYTES: 1 %
LYMPHOCYTES # BLD: 30 % (ref 24–43)
MCH RBC QN AUTO: 29.8 PG (ref 25.2–33.5)
MCHC RBC AUTO-ENTMCNC: 31.3 G/DL (ref 28.4–34.8)
MCV RBC AUTO: 95.3 FL (ref 82.6–102.9)
MONOCYTES # BLD: 7 % (ref 3–12)
NRBC AUTOMATED: 0 PER 100 WBC
PDW BLD-RTO: 13.2 % (ref 11.8–14.4)
PLATELET # BLD: 404 K/UL (ref 138–453)
PLATELET ESTIMATE: ABNORMAL
PMV BLD AUTO: 9.9 FL (ref 8.1–13.5)
RBC # BLD: 4.49 M/UL (ref 3.95–5.11)
RBC # BLD: ABNORMAL 10*6/UL
SEG NEUTROPHILS: 60 % (ref 36–65)
SEGMENTED NEUTROPHILS ABSOLUTE COUNT: 6.59 K/UL (ref 1.5–8.1)
WBC # BLD: 10.9 K/UL (ref 3.5–11.3)
WBC # BLD: ABNORMAL 10*3/UL

## 2020-11-04 PROCEDURE — 99215 OFFICE O/P EST HI 40 MIN: CPT | Performed by: NURSE PRACTITIONER

## 2020-11-04 RX ORDER — BLOOD-GLUCOSE METER
KIT MISCELLANEOUS
COMMUNITY
Start: 2020-10-03 | End: 2020-11-04 | Stop reason: SDUPTHER

## 2020-11-04 ASSESSMENT — ENCOUNTER SYMPTOMS
NAUSEA: 0
SHORTNESS OF BREATH: 0
GASTROINTESTINAL NEGATIVE: 1
VOMITING: 0
CONSTIPATION: 0
RESPIRATORY NEGATIVE: 1
EYES NEGATIVE: 1
BACK PAIN: 1
COUGH: 0
DIARRHEA: 0
ABDOMINAL PAIN: 0
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
## 2020-11-05 LAB
ALBUMIN SERPL-MCNC: 3.8 G/DL (ref 3.5–5.2)
ALBUMIN/GLOBULIN RATIO: 1.8 (ref 1–2.5)
ALP BLD-CCNC: 86 U/L (ref 35–104)
ALT SERPL-CCNC: 15 U/L (ref 5–33)
ANION GAP SERPL CALCULATED.3IONS-SCNC: 11 MMOL/L (ref 9–17)
AST SERPL-CCNC: 18 U/L
BILIRUB SERPL-MCNC: 0.26 MG/DL (ref 0.3–1.2)
BUN BLDV-MCNC: 9 MG/DL (ref 8–23)
BUN/CREAT BLD: ABNORMAL (ref 9–20)
CALCIUM SERPL-MCNC: 9 MG/DL (ref 8.6–10.4)
CHLORIDE BLD-SCNC: 100 MMOL/L (ref 98–107)
CHOLESTEROL/HDL RATIO: 2.3
CHOLESTEROL: 147 MG/DL
CO2: 28 MMOL/L (ref 20–31)
CREAT SERPL-MCNC: 0.86 MG/DL (ref 0.5–0.9)
GFR AFRICAN AMERICAN: >60 ML/MIN
GFR NON-AFRICAN AMERICAN: >60 ML/MIN
GFR SERPL CREATININE-BSD FRML MDRD: ABNORMAL ML/MIN/{1.73_M2}
GFR SERPL CREATININE-BSD FRML MDRD: ABNORMAL ML/MIN/{1.73_M2}
GLUCOSE BLD-MCNC: 65 MG/DL (ref 70–99)
HDLC SERPL-MCNC: 65 MG/DL
LDL CHOLESTEROL: 61 MG/DL (ref 0–130)
MAGNESIUM: 1.7 MG/DL (ref 1.6–2.6)
POTASSIUM SERPL-SCNC: 4.3 MMOL/L (ref 3.7–5.3)
SODIUM BLD-SCNC: 139 MMOL/L (ref 135–144)
TOTAL PROTEIN: 5.9 G/DL (ref 6.4–8.3)
TRIGL SERPL-MCNC: 105 MG/DL
TSH SERPL DL<=0.05 MIU/L-ACNC: 0.84 MIU/L (ref 0.3–5)
VLDLC SERPL CALC-MCNC: NORMAL MG/DL (ref 1–30)

## 2020-11-12 RX ORDER — GABAPENTIN 300 MG/1
CAPSULE ORAL
Qty: 360 CAPSULE | Refills: 3 | Status: SHIPPED | OUTPATIENT
Start: 2020-11-12 | End: 2021-03-12 | Stop reason: SDUPTHER

## 2020-11-21 ENCOUNTER — TELEPHONE (OUTPATIENT)
Dept: FAMILY MEDICINE CLINIC | Age: 70
End: 2020-11-21

## 2020-11-24 ENCOUNTER — CARE COORDINATION (OUTPATIENT)
Dept: CARE COORDINATION | Age: 70
End: 2020-11-24

## 2020-11-24 NOTE — CARE COORDINATION
Was asked by pcp to reach out to pt did talk to her for a few min. She was on her way to the store. She said she would be interested in quitting smoking but declines smoking cessation. She said she just wants wellbutrin. Talked to her about getting home care for PT due to falls and getting a safety assessment she did not want this either. She said she will not go out to get therapy either because she does not like to leave her house. She declines phone visit for smoking cessation. The phone call was lost unable to finish talking to pt.  Attempted to call her back left her a vm to call acm back

## 2020-12-04 ENCOUNTER — CARE COORDINATION (OUTPATIENT)
Dept: CARE COORDINATION | Age: 70
End: 2020-12-04

## 2020-12-04 NOTE — CARE COORDINATION
Spoke with pt who said she does not want to talk to anybody she said she wants to quit smoking she said she needs help to quit smoking offered to refer her to smoking cessation program she declines this offer because she would have to talk to someone. She again states she does not want to talk to anyone and for the acm to let her provider know this then she ended the call.

## 2021-02-26 DIAGNOSIS — J44.9 COPD MIXED TYPE (HCC): ICD-10-CM

## 2021-02-26 DIAGNOSIS — F32.A ANXIETY AND DEPRESSION: ICD-10-CM

## 2021-02-26 DIAGNOSIS — F41.9 ANXIETY AND DEPRESSION: ICD-10-CM

## 2021-02-26 RX ORDER — TRAZODONE HYDROCHLORIDE 50 MG/1
TABLET ORAL
COMMUNITY
Start: 2020-12-30

## 2021-02-26 RX ORDER — PREGABALIN 50 MG/1
CAPSULE ORAL
Status: ON HOLD | COMMUNITY
Start: 2021-02-21 | End: 2022-06-22

## 2021-02-26 RX ORDER — VARENICLINE TARTRATE 1 MG/1
TABLET, FILM COATED ORAL
Status: ON HOLD | COMMUNITY
Start: 2021-01-07 | End: 2022-06-22

## 2021-02-26 RX ORDER — OXYBUTYNIN CHLORIDE 5 MG/1
TABLET ORAL
COMMUNITY
Start: 2020-12-18 | End: 2021-02-26 | Stop reason: SDUPTHER

## 2021-02-26 RX ORDER — OXYBUTYNIN CHLORIDE 5 MG/1
TABLET ORAL
Qty: 90 TABLET | Refills: 1 | Status: SHIPPED | OUTPATIENT
Start: 2021-02-26

## 2021-02-26 RX ORDER — VENLAFAXINE HYDROCHLORIDE 150 MG/1
150 CAPSULE, EXTENDED RELEASE ORAL DAILY
Qty: 90 CAPSULE | Refills: 1 | Status: SHIPPED | OUTPATIENT
Start: 2021-02-26

## 2021-02-26 RX ORDER — ALBUTEROL SULFATE 90 UG/1
2 AEROSOL, METERED RESPIRATORY (INHALATION) EVERY 6 HOURS PRN
Qty: 3 INHALER | Refills: 1 | Status: SHIPPED | OUTPATIENT
Start: 2021-02-26 | End: 2021-05-26 | Stop reason: SDUPTHER

## 2021-02-26 RX ORDER — ACETAMINOPHEN AND CODEINE PHOSPHATE 300; 30 MG/1; MG/1
TABLET ORAL
Status: ON HOLD | COMMUNITY
Start: 2021-01-06 | End: 2022-06-22

## 2021-02-26 RX ORDER — ARIPIPRAZOLE 5 MG/1
5 TABLET ORAL DAILY
Qty: 90 TABLET | Refills: 1 | Status: SHIPPED | OUTPATIENT
Start: 2021-02-26

## 2021-02-26 RX ORDER — BUDESONIDE AND FORMOTEROL FUMARATE DIHYDRATE 160; 4.5 UG/1; UG/1
2 AEROSOL RESPIRATORY (INHALATION) 2 TIMES DAILY
Qty: 3 INHALER | Refills: 1 | Status: SHIPPED | OUTPATIENT
Start: 2021-02-26

## 2021-03-11 DIAGNOSIS — I25.10 CORONARY ARTERY DISEASE INVOLVING NATIVE CORONARY ARTERY OF NATIVE HEART WITHOUT ANGINA PECTORIS: ICD-10-CM

## 2021-03-12 DIAGNOSIS — I10 ESSENTIAL HYPERTENSION: ICD-10-CM

## 2021-03-12 DIAGNOSIS — E11.42 DIABETIC PERIPHERAL NEUROPATHY (HCC): ICD-10-CM

## 2021-03-12 RX ORDER — LISINOPRIL 10 MG/1
TABLET ORAL
Qty: 90 TABLET | Refills: 1 | Status: ON HOLD | OUTPATIENT
Start: 2021-03-12 | End: 2022-07-05 | Stop reason: HOSPADM

## 2021-03-12 RX ORDER — RANOLAZINE 500 MG/1
500 TABLET, EXTENDED RELEASE ORAL 2 TIMES DAILY
Qty: 180 TABLET | Refills: 1 | Status: SHIPPED | OUTPATIENT
Start: 2021-03-12

## 2021-03-12 RX ORDER — GABAPENTIN 300 MG/1
CAPSULE ORAL
Qty: 360 CAPSULE | Refills: 1 | Status: ON HOLD | OUTPATIENT
Start: 2021-03-12 | End: 2022-06-22

## 2021-03-26 DIAGNOSIS — F41.9 ANXIETY AND DEPRESSION: ICD-10-CM

## 2021-03-26 DIAGNOSIS — F32.A ANXIETY AND DEPRESSION: ICD-10-CM

## 2021-03-26 RX ORDER — VENLAFAXINE HYDROCHLORIDE 150 MG/1
150 CAPSULE, EXTENDED RELEASE ORAL DAILY
Qty: 90 CAPSULE | Refills: 1 | OUTPATIENT
Start: 2021-03-26

## 2021-04-01 DIAGNOSIS — I25.10 CORONARY ARTERY DISEASE INVOLVING NATIVE CORONARY ARTERY OF NATIVE HEART WITHOUT ANGINA PECTORIS: ICD-10-CM

## 2021-04-02 RX ORDER — CLOPIDOGREL BISULFATE 75 MG/1
TABLET ORAL
Qty: 90 TABLET | Refills: 1 | OUTPATIENT
Start: 2021-04-02

## 2021-05-25 DIAGNOSIS — J44.9 COPD MIXED TYPE (HCC): ICD-10-CM

## 2021-05-26 RX ORDER — ALBUTEROL SULFATE 90 UG/1
2 AEROSOL, METERED RESPIRATORY (INHALATION) EVERY 6 HOURS PRN
Qty: 3 INHALER | Refills: 1 | Status: SHIPPED | OUTPATIENT
Start: 2021-05-26

## 2021-06-03 LAB
AVERAGE GLUCOSE: 134
HBA1C MFR BLD: 6.3 %

## 2021-07-08 DIAGNOSIS — F32.A ANXIETY AND DEPRESSION: ICD-10-CM

## 2021-07-08 DIAGNOSIS — F41.9 ANXIETY AND DEPRESSION: ICD-10-CM

## 2021-07-10 RX ORDER — VENLAFAXINE HYDROCHLORIDE 150 MG/1
150 CAPSULE, EXTENDED RELEASE ORAL DAILY
Qty: 90 CAPSULE | Refills: 1 | OUTPATIENT
Start: 2021-07-10

## 2022-06-22 ENCOUNTER — APPOINTMENT (OUTPATIENT)
Dept: CT IMAGING | Age: 72
DRG: 641 | End: 2022-06-22
Payer: MEDICARE

## 2022-06-22 ENCOUNTER — HOSPITAL ENCOUNTER (INPATIENT)
Age: 72
LOS: 2 days | Discharge: HOME | DRG: 641 | End: 2022-06-24
Attending: EMERGENCY MEDICINE | Admitting: INTERNAL MEDICINE
Payer: MEDICARE

## 2022-06-22 DIAGNOSIS — R16.0 LIVER MASS: ICD-10-CM

## 2022-06-22 DIAGNOSIS — K76.9 LIVER LESION: ICD-10-CM

## 2022-06-22 DIAGNOSIS — E87.6 HYPOKALEMIA: Primary | ICD-10-CM

## 2022-06-22 LAB
ABSOLUTE EOS #: 0 K/UL (ref 0–0.4)
ABSOLUTE LYMPH #: 1.9 K/UL (ref 1–4.8)
ABSOLUTE MONO #: 1.1 K/UL (ref 0.1–1.3)
ALBUMIN SERPL-MCNC: 3.1 G/DL (ref 3.5–5.2)
ALP BLD-CCNC: 96 U/L (ref 35–104)
ALT SERPL-CCNC: 9 U/L (ref 5–33)
ANION GAP SERPL CALCULATED.3IONS-SCNC: 15 MMOL/L (ref 9–17)
AST SERPL-CCNC: 26 U/L
BACTERIA: NORMAL
BASOPHILS # BLD: 1 % (ref 0–2)
BASOPHILS ABSOLUTE: 0.1 K/UL (ref 0–0.2)
BILIRUB SERPL-MCNC: 0.59 MG/DL (ref 0.3–1.2)
BILIRUBIN DIRECT: 0.22 MG/DL
BILIRUBIN URINE: NEGATIVE
BILIRUBIN, INDIRECT: 0.37 MG/DL (ref 0–1)
BUN BLDV-MCNC: 8 MG/DL (ref 8–23)
CALCIUM SERPL-MCNC: 8.9 MG/DL (ref 8.6–10.4)
CASTS UA: NORMAL /LPF
CHLORIDE BLD-SCNC: 91 MMOL/L (ref 98–107)
CO2: 29 MMOL/L (ref 20–31)
COLOR: ABNORMAL
CREAT SERPL-MCNC: 0.69 MG/DL (ref 0.5–0.9)
EOSINOPHILS RELATIVE PERCENT: 0 % (ref 0–4)
EPITHELIAL CELLS UA: NORMAL /HPF
GFR AFRICAN AMERICAN: >60 ML/MIN
GFR NON-AFRICAN AMERICAN: >60 ML/MIN
GFR SERPL CREATININE-BSD FRML MDRD: ABNORMAL ML/MIN/{1.73_M2}
GLUCOSE BLD-MCNC: 125 MG/DL (ref 70–99)
GLUCOSE URINE: NEGATIVE
HCT VFR BLD CALC: 39 % (ref 36–46)
HEMOGLOBIN: 12.7 G/DL (ref 12–16)
KETONES, URINE: ABNORMAL
LACTIC ACID: 1.9 MMOL/L (ref 0.5–2.2)
LEUKOCYTE ESTERASE, URINE: ABNORMAL
LIPASE: 24 U/L (ref 13–60)
LYMPHOCYTES # BLD: 14 % (ref 24–44)
MCH RBC QN AUTO: 28.6 PG (ref 26–34)
MCHC RBC AUTO-ENTMCNC: 32.5 G/DL (ref 31–37)
MCV RBC AUTO: 88.2 FL (ref 80–100)
MONOCYTES # BLD: 8 % (ref 1–7)
NITRITE, URINE: NEGATIVE
PDW BLD-RTO: 13.5 % (ref 11.5–14.9)
PH UA: 5.5 (ref 5–8)
PLATELET # BLD: 552 K/UL (ref 150–450)
PMV BLD AUTO: 7.5 FL (ref 6–12)
POTASSIUM SERPL-SCNC: 2.7 MMOL/L (ref 3.7–5.3)
POTASSIUM SERPL-SCNC: 3.2 MMOL/L (ref 3.7–5.3)
PROTEIN UA: ABNORMAL
RBC # BLD: 4.42 M/UL (ref 4–5.2)
RBC UA: NORMAL /HPF
SEG NEUTROPHILS: 77 % (ref 36–66)
SEGMENTED NEUTROPHILS ABSOLUTE COUNT: 10.3 K/UL (ref 1.3–9.1)
SODIUM BLD-SCNC: 135 MMOL/L (ref 135–144)
SPECIFIC GRAVITY UA: 1.06 (ref 1–1.03)
TOTAL PROTEIN: 6.7 G/DL (ref 6.4–8.3)
TROPONIN, HIGH SENSITIVITY: 11 NG/L (ref 0–14)
TROPONIN, HIGH SENSITIVITY: 13 NG/L (ref 0–14)
TURBIDITY: CLEAR
URINE HGB: NEGATIVE
UROBILINOGEN, URINE: NORMAL
WBC # BLD: 13.3 K/UL (ref 3.5–11)
WBC UA: NORMAL /HPF

## 2022-06-22 PROCEDURE — 6360000004 HC RX CONTRAST MEDICATION: Performed by: STUDENT IN AN ORGANIZED HEALTH CARE EDUCATION/TRAINING PROGRAM

## 2022-06-22 PROCEDURE — 6360000002 HC RX W HCPCS: Performed by: STUDENT IN AN ORGANIZED HEALTH CARE EDUCATION/TRAINING PROGRAM

## 2022-06-22 PROCEDURE — 74177 CT ABD & PELVIS W/CONTRAST: CPT

## 2022-06-22 PROCEDURE — 99223 1ST HOSP IP/OBS HIGH 75: CPT | Performed by: INTERNAL MEDICINE

## 2022-06-22 PROCEDURE — 85025 COMPLETE CBC W/AUTO DIFF WBC: CPT

## 2022-06-22 PROCEDURE — 96375 TX/PRO/DX INJ NEW DRUG ADDON: CPT

## 2022-06-22 PROCEDURE — 2580000003 HC RX 258: Performed by: STUDENT IN AN ORGANIZED HEALTH CARE EDUCATION/TRAINING PROGRAM

## 2022-06-22 PROCEDURE — 2060000000 HC ICU INTERMEDIATE R&B

## 2022-06-22 PROCEDURE — 80076 HEPATIC FUNCTION PANEL: CPT

## 2022-06-22 PROCEDURE — 6370000000 HC RX 637 (ALT 250 FOR IP): Performed by: STUDENT IN AN ORGANIZED HEALTH CARE EDUCATION/TRAINING PROGRAM

## 2022-06-22 PROCEDURE — 96365 THER/PROPH/DIAG IV INF INIT: CPT

## 2022-06-22 PROCEDURE — 81001 URINALYSIS AUTO W/SCOPE: CPT

## 2022-06-22 PROCEDURE — 96366 THER/PROPH/DIAG IV INF ADDON: CPT

## 2022-06-22 PROCEDURE — 80048 BASIC METABOLIC PNL TOTAL CA: CPT

## 2022-06-22 PROCEDURE — 84132 ASSAY OF SERUM POTASSIUM: CPT

## 2022-06-22 PROCEDURE — 99285 EMERGENCY DEPT VISIT HI MDM: CPT

## 2022-06-22 PROCEDURE — 83605 ASSAY OF LACTIC ACID: CPT

## 2022-06-22 PROCEDURE — 36415 COLL VENOUS BLD VENIPUNCTURE: CPT

## 2022-06-22 PROCEDURE — 83690 ASSAY OF LIPASE: CPT

## 2022-06-22 PROCEDURE — 93005 ELECTROCARDIOGRAM TRACING: CPT | Performed by: EMERGENCY MEDICINE

## 2022-06-22 PROCEDURE — 84484 ASSAY OF TROPONIN QUANT: CPT

## 2022-06-22 PROCEDURE — 93005 ELECTROCARDIOGRAM TRACING: CPT | Performed by: STUDENT IN AN ORGANIZED HEALTH CARE EDUCATION/TRAINING PROGRAM

## 2022-06-22 RX ORDER — ONDANSETRON 2 MG/ML
4 INJECTION INTRAMUSCULAR; INTRAVENOUS ONCE
Status: COMPLETED | OUTPATIENT
Start: 2022-06-22 | End: 2022-06-22

## 2022-06-22 RX ORDER — 0.9 % SODIUM CHLORIDE 0.9 %
1000 INTRAVENOUS SOLUTION INTRAVENOUS ONCE
Status: COMPLETED | OUTPATIENT
Start: 2022-06-22 | End: 2022-06-22

## 2022-06-22 RX ORDER — ONDANSETRON 2 MG/ML
4 INJECTION INTRAMUSCULAR; INTRAVENOUS EVERY 6 HOURS PRN
Status: DISCONTINUED | OUTPATIENT
Start: 2022-06-22 | End: 2022-06-22

## 2022-06-22 RX ORDER — SODIUM CHLORIDE 0.9 % (FLUSH) 0.9 %
10 SYRINGE (ML) INJECTION PRN
Status: DISCONTINUED | OUTPATIENT
Start: 2022-06-22 | End: 2022-06-25 | Stop reason: HOSPADM

## 2022-06-22 RX ORDER — ONDANSETRON 4 MG/1
4 TABLET, ORALLY DISINTEGRATING ORAL EVERY 8 HOURS PRN
Status: DISCONTINUED | OUTPATIENT
Start: 2022-06-22 | End: 2022-06-22

## 2022-06-22 RX ORDER — ENOXAPARIN SODIUM 100 MG/ML
40 INJECTION SUBCUTANEOUS DAILY
Status: DISCONTINUED | OUTPATIENT
Start: 2022-06-23 | End: 2022-06-25 | Stop reason: HOSPADM

## 2022-06-22 RX ORDER — SODIUM CHLORIDE 9 MG/ML
INJECTION, SOLUTION INTRAVENOUS CONTINUOUS
Status: DISCONTINUED | OUTPATIENT
Start: 2022-06-22 | End: 2022-06-25 | Stop reason: HOSPADM

## 2022-06-22 RX ORDER — ACETAMINOPHEN 325 MG/1
650 TABLET ORAL EVERY 6 HOURS PRN
Status: DISCONTINUED | OUTPATIENT
Start: 2022-06-22 | End: 2022-06-25 | Stop reason: HOSPADM

## 2022-06-22 RX ORDER — SODIUM CHLORIDE 9 MG/ML
INJECTION, SOLUTION INTRAVENOUS PRN
Status: DISCONTINUED | OUTPATIENT
Start: 2022-06-22 | End: 2022-06-25 | Stop reason: HOSPADM

## 2022-06-22 RX ORDER — SODIUM CHLORIDE 0.9 % (FLUSH) 0.9 %
5-40 SYRINGE (ML) INJECTION EVERY 12 HOURS SCHEDULED
Status: DISCONTINUED | OUTPATIENT
Start: 2022-06-22 | End: 2022-06-25 | Stop reason: HOSPADM

## 2022-06-22 RX ORDER — POTASSIUM CHLORIDE 7.45 MG/ML
10 INJECTION INTRAVENOUS
Status: COMPLETED | OUTPATIENT
Start: 2022-06-22 | End: 2022-06-22

## 2022-06-22 RX ORDER — ACETAMINOPHEN 650 MG/1
650 SUPPOSITORY RECTAL EVERY 6 HOURS PRN
Status: DISCONTINUED | OUTPATIENT
Start: 2022-06-22 | End: 2022-06-25 | Stop reason: HOSPADM

## 2022-06-22 RX ORDER — GABAPENTIN 600 MG/1
600 TABLET ORAL 3 TIMES DAILY
Status: ON HOLD | COMMUNITY
End: 2022-06-30 | Stop reason: SDUPTHER

## 2022-06-22 RX ORDER — MORPHINE SULFATE 4 MG/ML
4 INJECTION, SOLUTION INTRAMUSCULAR; INTRAVENOUS ONCE
Status: COMPLETED | OUTPATIENT
Start: 2022-06-22 | End: 2022-06-22

## 2022-06-22 RX ORDER — 0.9 % SODIUM CHLORIDE 0.9 %
100 INTRAVENOUS SOLUTION INTRAVENOUS ONCE
Status: COMPLETED | OUTPATIENT
Start: 2022-06-22 | End: 2022-06-22

## 2022-06-22 RX ORDER — FENTANYL CITRATE 50 UG/ML
50 INJECTION, SOLUTION INTRAMUSCULAR; INTRAVENOUS ONCE
Status: COMPLETED | OUTPATIENT
Start: 2022-06-22 | End: 2022-06-22

## 2022-06-22 RX ADMIN — POTASSIUM CHLORIDE 10 MEQ: 7.46 INJECTION, SOLUTION INTRAVENOUS at 17:28

## 2022-06-22 RX ADMIN — ONDANSETRON 4 MG: 2 INJECTION INTRAMUSCULAR; INTRAVENOUS at 15:56

## 2022-06-22 RX ADMIN — SODIUM CHLORIDE, PRESERVATIVE FREE 10 ML: 5 INJECTION INTRAVENOUS at 17:55

## 2022-06-22 RX ADMIN — FENTANYL CITRATE 50 MCG: 50 INJECTION, SOLUTION INTRAMUSCULAR; INTRAVENOUS at 20:14

## 2022-06-22 RX ADMIN — IOPAMIDOL 75 ML: 755 INJECTION, SOLUTION INTRAVENOUS at 17:54

## 2022-06-22 RX ADMIN — POTASSIUM CHLORIDE 10 MEQ: 7.46 INJECTION, SOLUTION INTRAVENOUS at 20:14

## 2022-06-22 RX ADMIN — SODIUM CHLORIDE 100 ML: 9 INJECTION, SOLUTION INTRAVENOUS at 17:54

## 2022-06-22 RX ADMIN — POTASSIUM CHLORIDE 10 MEQ: 7.46 INJECTION, SOLUTION INTRAVENOUS at 18:54

## 2022-06-22 RX ADMIN — POTASSIUM CHLORIDE 10 MEQ: 7.46 INJECTION, SOLUTION INTRAVENOUS at 16:35

## 2022-06-22 RX ADMIN — POTASSIUM BICARBONATE 40 MEQ: 782 TABLET, EFFERVESCENT ORAL at 16:36

## 2022-06-22 RX ADMIN — SODIUM CHLORIDE 1000 ML: 9 INJECTION, SOLUTION INTRAVENOUS at 16:02

## 2022-06-22 RX ADMIN — MORPHINE SULFATE 4 MG: 4 INJECTION, SOLUTION INTRAMUSCULAR; INTRAVENOUS at 15:58

## 2022-06-22 ASSESSMENT — PAIN - FUNCTIONAL ASSESSMENT
PAIN_FUNCTIONAL_ASSESSMENT: 0-10
PAIN_FUNCTIONAL_ASSESSMENT: ACTIVITIES ARE NOT PREVENTED
PAIN_FUNCTIONAL_ASSESSMENT: ACTIVITIES ARE NOT PREVENTED
PAIN_FUNCTIONAL_ASSESSMENT: 0-10
PAIN_FUNCTIONAL_ASSESSMENT: ACTIVITIES ARE NOT PREVENTED
PAIN_FUNCTIONAL_ASSESSMENT: 0-10

## 2022-06-22 ASSESSMENT — PAIN DESCRIPTION - ONSET
ONSET: ON-GOING
ONSET: GRADUAL
ONSET: ON-GOING

## 2022-06-22 ASSESSMENT — PAIN DESCRIPTION - PAIN TYPE
TYPE: ACUTE PAIN

## 2022-06-22 ASSESSMENT — PAIN DESCRIPTION - FREQUENCY
FREQUENCY: CONTINUOUS

## 2022-06-22 ASSESSMENT — PAIN DESCRIPTION - DESCRIPTORS
DESCRIPTORS: CRAMPING
DESCRIPTORS: DISCOMFORT
DESCRIPTORS: DISCOMFORT
DESCRIPTORS: SHARP
DESCRIPTORS: PRESSURE

## 2022-06-22 ASSESSMENT — ENCOUNTER SYMPTOMS
NAUSEA: 1
DIARRHEA: 0
EYE ITCHING: 0
CONSTIPATION: 0
VOMITING: 1
SHORTNESS OF BREATH: 0
ABDOMINAL PAIN: 1
ABDOMINAL DISTENTION: 0
SINUS PRESSURE: 0
EYE PAIN: 0
SORE THROAT: 0
COUGH: 0
SINUS PAIN: 0

## 2022-06-22 ASSESSMENT — PAIN SCALES - GENERAL
PAINLEVEL_OUTOF10: 0
PAINLEVEL_OUTOF10: 4
PAINLEVEL_OUTOF10: 3
PAINLEVEL_OUTOF10: 6
PAINLEVEL_OUTOF10: 4
PAINLEVEL_OUTOF10: 5
PAINLEVEL_OUTOF10: 4

## 2022-06-22 ASSESSMENT — PAIN DESCRIPTION - LOCATION
LOCATION: ABDOMEN
LOCATION: CHEST
LOCATION: ABDOMEN
LOCATION: FLANK
LOCATION: ABDOMEN

## 2022-06-22 ASSESSMENT — PAIN DESCRIPTION - ORIENTATION
ORIENTATION: LEFT
ORIENTATION: MID
ORIENTATION: LOWER
ORIENTATION: RIGHT;LOWER

## 2022-06-22 NOTE — ED PROVIDER NOTES
[facility] AND [facility]  Emergency Department Encounter  EmergencyMedicine Resident     Pt Name:Karina Drake  MRN: [id number]  [place] 1950  Date of evaluation: 6/22/22  PCP:  Fabio Miller MD    This patient was evaluated in the Emergency Department for symptoms described in the history of present illness. The patient was evaluated in the context of the global COVID-19 pandemic, which necessitated consideration that the patient might be at risk for infection with the SARS-CoV-2 virus that causes COVID-19. Institutional protocols and algorithms that pertain to the evaluation of patients at risk for COVID-19 are in a state of rapid change based on information released by regulatory bodies including the CDC and federal and state organizations. These policies and algorithms were followed during the patient's care in the ED. CHIEF COMPLAINT       Chief Complaint   Patient presents with    Abdominal Pain    Emesis       HISTORY OF PRESENT ILLNESS  (Location/Symptom, Timing/Onset, Context/Setting, Quality, Duration, Modifying Factors, Severity.)      Valencia Ferreira is a 81 y.o. female who presents with epigastric abdominal pain greater than 1 week duration. Patient was seen by her primary care doctor a week ago and prescribed nausea medications and started on an antibiotic for urine infection. She states that her symptoms have not improved. She denies any fevers, blood or black in the stool, chest pain, shortness of breath, constipation or diarrhea. Reports that she is still passing flatus. Medical history includes type 2 diabetes, COPD, CAD s/p CABG x2 in 1996, cholelithiasis. Denies any previous abdominal surgeries. Not on any blood thinners but on DAPT. Last echo in 2021 showed LV EF of 55%.      PAST MEDICAL / SURGICAL / SOCIAL / FAMILY HISTORY      has a past medical history of Acid reflux, Arthritis, Cataracts, bilateral, CHF (congestive heart failure) ([facility] [address] COPD (chronic obstructive pulmonary disease) ([facility] Gall stones, Head injury, Hyperlipidemia, Hypertension, Insomnia, MI (myocardial infarction) ([facility] Neuropathy, Sleep apnea, and Type 2 diabetes mellitus without complication ([facility] has a past surgical history that includes Femur Surgery; Foot surgery (Left); Mandible fracture surgery; Cardiac surgery (); Ankle surgery (Left); Tonsillectomy; and Coronary angioplasty with stent (). Social History     Socioeconomic History    Marital status: Single     Spouse name: Not on file    Number of children: Not on file    Years of education: Not on file    Highest education level: Not on file   Occupational History    Occupation: disability    Tobacco Use    Smoking status: Former Smoker     Packs/day: 1.00     Years: 40.00     Pack years: 40.00     Types: Cigarettes     Quit date:      Years since quittin.4    Smokeless tobacco: Never Used    Tobacco comment: 2 packs per week since May, previously quit x 20 years    Vaping Use    Vaping Use: Never used   Substance and Sexual Activity    Alcohol use: Yes     Alcohol/week: 1.0 standard drink     Types: 1 Cans of beer per week    Drug use: Yes     Types: Marijuana Kim Kales)     Comment: \"She said she did everything in the 's\"     Sexual activity: Not on file   Other Topics Concern    Not on file   Social History Narrative    Not on file     Social Determinants of Health     Financial Resource Strain:     Difficulty of Paying Living Expenses: Not on file   Food Insecurity:     Worried About Running Out of Food in the Last Year: Not on file    Isabel of Food in the Last Year: Not on file   Transportation Needs:     Lack of Transportation (Medical): Not on file    Lack of Transportation (Non-Medical):  Not on file   Physical Activity:     Days of Exercise per Week: Not on file    Minutes of Exercise per Session: Not on file   Stress:     Feeling of Stress : Not on file   Social Connections:     Frequency of Communication with Friends and Family: Not on file    Frequency of Social Gatherings with Friends and Family: Not on file    Attends Scientologist Services: Not on file    Active Member of Clubs or Organizations: Not on file    Attends Club or Organization Meetings: Not on file    Marital Status: Not on file   Intimate Partner Violence:     Fear of Current or Ex-Partner: Not on file    Emotionally Abused: Not on file    Physically Abused: Not on file    Sexually Abused: Not on file   Housing Stability:     Unable to Pay for Housing in the Last Year: Not on file    Number of [place] in the Last Year: Not on file    Unstable Housing in the Last Year: Not on file       Family History   Problem Relation Age of Onset    Stroke Mother         COD     Other Father         pericardial infection        Allergies:  Chantix [varenicline tartrate] and Sulphadimidine [sulfamethazine]    Home Medications:  Prior to Admission medications    Medication Sig Start Date End Date Taking? Authorizing Provider   gabapentin (NEURONTIN) 600 MG tablet Take 600 mg by mouth 3 times daily.    Yes Historical Provider, MD   albuterol sulfate  (90 Base) MCG/ACT inhaler Inhale 2 puffs into the lungs every 6 hours as needed for Wheezing or Shortness of Breath 5/26/21   Crispin Grills, APRN - CNP   ranolazine (RANEXA) 500 MG extended release tablet Take 1 tablet by mouth 2 times daily 3/12/21   Crispin Grills, APRN - CNP   lisinopril (PRINIVIL;ZESTRIL) 10 MG tablet take 1 tablet by mouth once daily 3/12/21   Crispin Grills, APRN - CNP   venlafaxine (EFFEXOR XR) 150 MG extended release capsule Take 1 capsule by mouth daily Take with 75 mg capsule 2/26/21   Crispin Grills, APRN - CNP   ARIPiprazole (ABILIFY) 5 MG tablet Take 1 tablet by mouth daily 2/26/21   Crispin Grills, APRN - CNP   budesonide-formoterol [facility]) 160-4.5 MCG/ACT AERO Inhale 2 puffs into the lungs 2 times daily 2/26/21   Crispin Grills, APRN - CNP tiotropium (SPIRIVA RESPIMAT) 2.5 MCG/ACT AERS inhaler Inhale 2 puffs into the lungs daily 2/26/21   [facility], APRN - CNP   traZODone (DESYREL) 50 MG tablet take 1 tablet by mouth at bedtime if needed for sleep 12/30/20   Historical Provider, MD   oxybutynin (DITROPAN) 5 MG tablet take 1 tablet by mouth twice a day 2/26/21   [facility], APRN - CNP   glucose monitoring kit (FREESTYLE) monitoring kit 1 kit by Does not apply route daily 10/3/20   [facility] APRN - CNP   blood glucose monitor strips Test 3 times a day & as needed for symptoms of irregular blood glucose. Dispense sufficient amount for indicated testing frequency plus additional to accommodate PRN testing needs.  10/3/20   [facility], APRN - CNP   Lancets MISC 1 each by Does not apply route 3 times daily 10/3/20   [facility] C[facility]   atorvastatin (LIPITOR) 80 MG tablet Take 1 tablet by mouth daily 9/30/20   [facility], APRN - CNP   bisoprolol (ZEBETA) 5 MG tablet take 1 tablet by mouth once daily 9/30/20   [facility], APRN - CNP   omeprazole (PRILOSEC) 40 MG delayed release capsule take 1 capsule by mouth every morning BEFORE BREAKFAST 9/30/20   [facility], APRN - CNP   clopidogrel (PLAVIX) 75 MG tablet take 1 tablet by mouth once daily 9/30/20   [facility]   venlafaxine (EFFEXOR XR) 75 MG extended release capsule Take 1 capsule by mouth daily 9/30/20   [facility], APRN - CNP   glimepiride (AMARYL) 2 MG tablet take 1 tablet by mouth twice a day before meals 9/30/20   [facility], BENNETT Wang CNP   Semaglutide, 1 MG/DOSE, (OZEMPIC, 1 MG/DOSE,) 2 MG/1.5ML SOPN Inject 1 mg per week 9/30/20   [facility], APRN - CNP   Multiple Vitamins-Minerals (THERAPEUTIC MULTIVITAMIN-MINERALS) tablet Take 1 tablet by mouth daily 9/30/20   [facility], APRN - CNP   Melatonin 5 MG SUBL Place 1 applicator under the tongue nightly as needed (insomnia) 9/30/20   Jarrell Richards, APRN - CNP   Insulin Pen Needle 32G X 4 MM MISC 1 each by Does not apply route once a week 3/12/20   Renée Manzanares MD   fluticasone Rima Moreno) 50 MCG/ACT nasal spray 2 sprays by Nasal route daily 9/13/19   Renée Manzanares MD   aspirin 81 MG tablet Take 81 mg by mouth daily    Historical Provider, MD       REVIEW OF SYSTEMS    (2-9 systems for level 4, 10 or more for level 5)      Review of Systems   Constitutional: Negative for activity change, chills and fever. HENT: Negative for congestion, sinus pressure, sinus pain and sore throat. Eyes: Negative for pain and itching. Respiratory: Negative for cough and shortness of breath. Cardiovascular: Negative for chest pain. Gastrointestinal: Positive for abdominal pain, nausea and vomiting. Negative for abdominal distention, constipation and diarrhea. Endocrine: Negative for polyuria. Genitourinary: Negative for dysuria and frequency. Musculoskeletal: Negative for arthralgias. Skin: Negative for rash. Neurological: Negative for light-headedness and headaches. PHYSICAL EXAM   (up to 7 for level 4, 8 or more for level 5)      INITIAL VITALS:   BP (!) 98/46   Pulse 76   Temp 97.8 °F (36.6 °C) (Oral)   Resp 19   Ht 5' (1.524 m)   Wt 175 lb (79.4 kg)   SpO2 95%   BMI 34.18 kg/m²     Physical Exam  Vitals reviewed. Constitutional:       General: She is not in acute distress. HENT:      Head: Normocephalic and atraumatic. Ears:      Comments: Hearing grossly normal     Nose: Nose normal.      Mouth/Throat:      Mouth: Mucous membranes are moist.      Pharynx: Oropharynx is clear. Eyes:      General: No scleral icterus. Conjunctiva/sclera: Conjunctivae normal.      Pupils: Pupils are equal, round, and reactive to light. Cardiovascular:      Rate and Rhythm: Normal rate and regular rhythm. Pulses: Normal pulses. Pulmonary:      Effort: Pulmonary effort is normal. No respiratory distress. Breath sounds: Normal breath sounds.    Abdominal:      General: There is no distension. Tenderness: There is abdominal tenderness in the epigastric area. There is no guarding. Comments: Abdomen is soft, mild tenderness to palpation in epigastric region, no guarding or peritoneal signs. Musculoskeletal:      Cervical back: No muscular tenderness. Right lower leg: No edema. Left lower leg: No edema. Skin:     General: Skin is warm and dry. Capillary Refill: Capillary refill takes less than 2 seconds. Neurological:      General: No focal deficit present. Mental Status: She is alert and oriented to person, place, and time. Mental status is at baseline. DIFFERENTIAL  DIAGNOSIS     PLAN (LABS / IMAGING / EKG):  Orders Placed This Encounter   Procedures    CT ABDOMEN PELVIS W IV CONTRAST Additional Contrast? None    Basic Metabolic Panel    CBC with Auto Differential    Hepatic Function Panel    Lipase    Lactic Acid    Urinalysis with Reflex to Culture    Troponin    Troponin    Potassium    Microscopic Urinalysis    Basic Metabolic Panel w/ Reflex to MG    CBC    Diet NPO    Vital signs per unit routine    Notify physician    Daily weights    Intake and output    Telemetry monitoring - continuous duration    Up with assistance    Turn or assist with turn approximately every 2 hours if patient is unable to turn self. Remind patient to turn if necessary.     Assess skin per unit guidelines    Pad/offload medical devices    Maintain HOB at the lowest elevation consistent with medical plan of care    Use lift equipment for lifting patient    Maintain heels off of bed at all times    HYPOGLYCEMIA TREATMENT: blood glucose less than 50 mg/dL and patient  ALERT and TOLERATING PO    HYPOGLYCEMIA TREATMENT: blood glucose less than 70 mg/dL and patient ALERT and TOLERATING PO    HYPOGLYCEMIA TREATMENT: blood glucose less than 70 mg/dL and patient NOT ALERT or NPO    Full Code    Inpatient consult to Internal Medicine    Inpatient consult to GI    Initiate Oxygen Therapy Protocol    Pulse Oximetry Spot Check    POC Glucose Fingerstick    POCT Glucose    POCT Glucose    EKG 12 Lead    Saline lock IV    ADMIT TO INPATIENT       MEDICATIONS ORDERED:  Orders Placed This Encounter   Medications    morphine sulfate (PF) injection 4 mg    0.9 % sodium chloride bolus    ondansetron (ZOFRAN) injection 4 mg    potassium bicarb-citric acid (EFFER-K) effervescent tablet 40 mEq    potassium chloride 10 mEq/100 mL IVPB (Peripheral Line)    0.9 % sodium chloride bolus    sodium chloride flush 0.9 % injection 10 mL    iopamidol (ISOVUE-370) 76 % injection 75 mL    fentaNYL (SUBLIMAZE) injection 50 mcg    sodium chloride flush 0.9 % injection 5-40 mL    sodium chloride flush 0.9 % injection 10 mL    0.9 % sodium chloride infusion    enoxaparin (LOVENOX) injection 40 mg     Order Specific Question:   Indication of Use     Answer:   Prophylaxis-DVT/PE    DISCONTD: ondansetron (ZOFRAN-ODT) disintegrating tablet 4 mg    DISCONTD: ondansetron (ZOFRAN) injection 4 mg    magnesium hydroxide (MILK OF MAGNESIA) 400 MG/5ML suspension 30 mL    OR Linked Order Group     acetaminophen (TYLENOL) tablet 650 mg     acetaminophen (TYLENOL) suppository 650 mg    0.9 % sodium chloride infusion    OR Linked Order Group     morphine (PF) injection 2 mg     morphine sulfate (PF) injection 4 mg    albuterol sulfate HFA (PROVENTIL;VENTOLIN;PROAIR) 108 (90 Base) MCG/ACT inhaler 2 puff     Order Specific Question:   Initiate RT Bronchodilator Protocol     Answer:    Yes    tiotropium (SPIRIVA RESPIMAT) 2.5 MCG/ACT inhaler 2 puff    budesonide-formoterol (SYMBICORT) 160-4.5 MCG/ACT inhaler 2 puff    fluticasone (FLONASE) 50 MCG/ACT nasal spray 2 spray    glucose chewable tablet 16 g    OR Linked Order Group     dextrose bolus 10% 125 mL     dextrose bolus 10% 250 mL    glucagon (rDNA) injection 1 mg    dextrose 5 % solution    insulin Turbidity UA Clear Clear    Glucose, Ur NEGATIVE NEGATIVE    Bilirubin Urine NEGATIVE NEGATIVE    Ketones, Urine MOD (A) NEGATIVE    Specific Gravity, UA 1.060 (H) 1.000 - 1.030    Urine Hgb NEGATIVE NEGATIVE    pH, UA 5.5 5.0 - 8.0    Protein, UA TRACE (A) NEGATIVE    Urobilinogen, Urine Normal Normal    Nitrite, Urine NEGATIVE NEGATIVE    Leukocyte Esterase, Urine TRACE (A) NEGATIVE   Troponin   Result Value Ref Range    Troponin, High Sensitivity 11 0 - 14 ng/L   Troponin   Result Value Ref Range    Troponin, High Sensitivity 13 0 - 14 ng/L   Potassium   Result Value Ref Range    Potassium 3.2 (L) 3.7 - 5.3 mmol/L   Microscopic Urinalysis   Result Value Ref Range    WBC, UA 0 TO 2 /HPF    RBC, UA 0 TO 2 /HPF    Casts UA 0 TO 2 /LPF    Epithelial Cells UA 0 TO 2 /HPF    Bacteria, UA None None   POC Glucose Fingerstick   Result Value Ref Range    POC Glucose 87 65 - 105 mg/dL   EKG 12 Lead   Result Value Ref Range    Ventricular Rate 63 BPM    Atrial Rate 63 BPM    P-R Interval 146 ms    QRS Duration 124 ms    Q-T Interval 558 ms    QTc Calculation (Bazett) 571 ms    P Axis 65 degrees    R Axis 50 degrees    T Axis 119 degrees       IMPRESSION: Jagdeep Don is a 81 y.o. woman presenting for [facility] a week of abdominal pain w/n/v. She is well appearing and vitally stable. She was seen for this last week and treated w/antiemetics and started on macrobid for urine infection with no relief. Denies sxs of GI bleeding. Hx of cholelithiasis. Given ongoing sx and inability to toelrate PO will complete biochemical assessment and likely CT scan of the abd/pelv    RADIOLOGY:  CT ABDOMEN PELVIS W IV CONTRAST Additional Contrast? None    Result Date: 6/22/2022  1. Findings compatible with metastatic disease in the liver. The gallbladder is not discretely visualized separate from this locally aggressive process, which may be directly involved or the primary source of disease.   Direct extension from the liver appears to involve the adjacent stomach and duodenum. 2.  Findings compatible with peritoneal carcinomatosis with discrete peritoneal implants. Trace abdominopelvic ascites. 3.  Choledocholithiasis with biliary dilatation. 4.  Infrarenal aortic aneurysm measuring up to 3.1 cm. Please see recommendation below. RECOMMENDATIONS: 3.1 cm infrarenal abdominal aortic aneurysm. Recommend follow-up every 3 years. Reference: J Am Vish Radiol 1506;41:015-312. EKG  Normal rate, sinus, prolonged QRS duration-left bundle branch block, prolonged  ms, normal axis, T wave inversions in aVL, V2. No ST segment elevations or depressions    All EKG's are interpreted by the Emergency Department Physician who either signs or Co-signs this chart in the absence of a cardiologist.    EMERGENCY DEPARTMENT COURSE:  Patient seen and evaluated, VSS and nontoxic in appearance. ED Course as of 06/23/22 0036   Wed Jun 22, 2022   1604 WBC(!): 13.3 [LR]   1604 Hemoglobin Quant: 12.7 [LR]   1623 Potassium(!!): 2.7  Replenishing 40mEq PO and 40 PIV [LR]   1627 Lipase: 24 [LR]   1627 Lactic Acid: 1.9 [LR]   1627 Creatinine: 0.69 [LR]   1627 BUN,BUNPL: 8 [LR]   2009 Awaiting read on CT. Heterogenous appearance of liver. Awaiting recheck of K. [LR]   2207    IMPRESSION:  1. Findings compatible with metastatic disease in the liver. The  gallbladder is not discretely visualized separate from this locally  aggressive process, which may be directly involved or the primary source of  disease. Direct extension from the liver appears to involve the adjacent  stomach and duodenum.     2. Findings compatible with peritoneal carcinomatosis with discrete  peritoneal implants. Trace abdominopelvic ascites.     3. Choledocholithiasis with biliary dilatation.     4. Infrarenal aortic aneurysm measuring up to 3.1 cm. Please see  recommendation below. [LR]   5749 Patient was updated re scan findings.  Aware of malignancy and evidence of widely metastatic disease. Agreeable to admission for further w/u and management of hypokalemia as well [LR]      ED Course User Index  [LR] Elif Souza DO        No notes of EC Admission Criteria type on file. PROCEDURES:  none    CONSULTS:  IP CONSULT TO INTERNAL MEDICINE  IP CONSULT TO GI    CRITICAL CARE:  See attending note    FINAL IMPRESSION      1. Hypokalemia          DISPOSITION / PLAN     DISPOSITION Admitted 06/22/2022 10:31:09 PM      PATIENT REFERRED TO:  No follow-up provider specified.     DISCHARGE MEDICATIONS:  Current Discharge Medication List          Mj Kelly DO  Emergency Medicine Resident    (Please note that portions of thisnote were completed with a voice recognition program.  Efforts were made to edit the dictations but occasionally words are mis-transcribed.)       Mj Kelly DO  Resident  06/23/22 2814

## 2022-06-22 NOTE — ED PROVIDER NOTES
[address] [facility] ED     Emergency Department     Faculty Attestation        I performed a history and physical examination of the patient and discussed management with the resident. I reviewed the residents note and agree with the documented findings and plan of care. Any areas of disagreement are noted on the chart. I was personally present for the key portions of any procedures. I have documented in the chart those procedures where I was not present during the key portions. I have reviewed the emergency nurses triage note. I agree with the chief complaint, past medical history, past surgical history, allergies, medications, social and family history as documented unless otherwise noted below. Documentation of the HPI, Physical Exam and Medical Decision Making performed by medical students or scribes is based on my personal performance of the HPI, PE and MDM. For Physician Assistant/ Nurse Practitioner cases/documentation I have have had a face to face evaluation with this patient and have completed at least one if not all key elements of the E/M (history, physical exam, and MDM). Additional findings are as noted. Pertinent Comments     Abdominal pain, nausea and vomiting for the past week. Seen by her PCP for this. Also being treated for urinary tract infection. Afebrile, nontoxic, normal vital signs. No acute distress. Will get lab work, consider CT scan      The care is provided during an unprecedented national emergency due to the novel coronavirus, COVID-19.     (Please note that portions of this note were completed with a voice recognition program.  Efforts were made to edit the dictations but occasionally words are mis-transcribed.)    Benjamin Pacheco DO  Attending Emergency Physician         Benjamin Pacheco DO  06/22/22 3933

## 2022-06-22 NOTE — ED NOTES
NURSE REPORT:    Verbal report given to Álvaro Tamayo RN on [place]      Report consisted of patient's Situation, Background, Assessment and   Recommendations(SBAR). Information from the following report(s) Nurse Handoff Report was reviewed with the receiving nurse. Lines:   Peripheral IV 06/22/22 Proximal;Right; Anterior Forearm (Active)        Opportunity for questions and clarification was provided.        Hayde Nesbitt RN  06/22/22 [id number]

## 2022-06-22 NOTE — ED TRIAGE NOTES
Mode of arrival (squad #, walk in, police, etc) : walk in        Chief complaint(s): abd pain        Arrival Note (brief scenario, treatment PTA, etc). : Pt reports abdominal pain and vomiting x1 week. Pt reports she has not had a BM in a week as well. Pt states she was given a pill to help with the vomiting but it only made her gag. Pt denies chest pain, SOB, fevers. C= \"Have you ever felt that you should Cut down on your drinking? \"  No  A= \"Have people Annoyed you by criticizing your drinking? \"  No  G= \"Have you ever felt bad or Guilty about your drinking? \"  No  E= \"Have you ever had a drink as an Eye-opener first thing in the morning to steady your nerves or to help a hangover? \"  No      Deferred []      Reason for deferring: N/A    *If yes to two or more: probable alcohol abuse. *

## 2022-06-22 NOTE — ED NOTES
Dr. Mode Nagel aware of patient c/o abd pain and nausea. Await orders.       Carie Martínez RN  06/22/22 1950

## 2022-06-22 NOTE — ED NOTES
Patient complained of chest pressure that was slowly improving. Dr. Goldstein Doctor made aware with repeat EKG ordered.       Mitzy Lema, RN  06/22/22 [address], RN  06/22/22 1013

## 2022-06-23 LAB
AFP: 182 UG/L
ANION GAP SERPL CALCULATED.3IONS-SCNC: 10 MMOL/L (ref 9–17)
BUN BLDV-MCNC: 7 MG/DL (ref 8–23)
CA 19-9: ABNORMAL U/ML (ref 0–35)
CALCIUM SERPL-MCNC: 8 MG/DL (ref 8.6–10.4)
CARCINOEMBRYONIC ANTIGEN: 296.6 NG/ML
CHLORIDE BLD-SCNC: 97 MMOL/L (ref 98–107)
CO2: 30 MMOL/L (ref 20–31)
CREAT SERPL-MCNC: 0.64 MG/DL (ref 0.5–0.9)
EKG ATRIAL RATE: 63 BPM
EKG P AXIS: 65 DEGREES
EKG P-R INTERVAL: 146 MS
EKG Q-T INTERVAL: 558 MS
EKG QRS DURATION: 124 MS
EKG QTC CALCULATION (BAZETT): 571 MS
EKG R AXIS: 50 DEGREES
EKG T AXIS: 119 DEGREES
EKG VENTRICULAR RATE: 63 BPM
FERRITIN: 627 NG/ML (ref 13–150)
FOLATE: 12.7 NG/ML
GFR AFRICAN AMERICAN: >60 ML/MIN
GFR NON-AFRICAN AMERICAN: >60 ML/MIN
GFR SERPL CREATININE-BSD FRML MDRD: ABNORMAL ML/MIN/{1.73_M2}
GLUCOSE BLD-MCNC: 105 MG/DL (ref 70–99)
GLUCOSE BLD-MCNC: 125 MG/DL (ref 65–105)
GLUCOSE BLD-MCNC: 143 MG/DL (ref 65–105)
GLUCOSE BLD-MCNC: 155 MG/DL (ref 65–105)
GLUCOSE BLD-MCNC: 87 MG/DL (ref 65–105)
GLUCOSE BLD-MCNC: 88 MG/DL (ref 65–105)
HCT VFR BLD CALC: 34.3 % (ref 36–46)
HEMOGLOBIN: 11.2 G/DL (ref 12–16)
IRON SATURATION: 13 % (ref 20–55)
IRON: 24 UG/DL (ref 37–145)
MAGNESIUM: 1.6 MG/DL (ref 1.6–2.6)
MCH RBC QN AUTO: 29.3 PG (ref 26–34)
MCHC RBC AUTO-ENTMCNC: 32.8 G/DL (ref 31–37)
MCV RBC AUTO: 89.6 FL (ref 80–100)
PDW BLD-RTO: 13.8 % (ref 11.5–14.9)
PLATELET # BLD: 461 K/UL (ref 150–450)
PMV BLD AUTO: 7.6 FL (ref 6–12)
POTASSIUM SERPL-SCNC: 3.4 MMOL/L (ref 3.7–5.3)
RBC # BLD: 3.83 M/UL (ref 4–5.2)
SODIUM BLD-SCNC: 137 MMOL/L (ref 135–144)
TOTAL IRON BINDING CAPACITY: 182 UG/DL (ref 250–450)
UNSATURATED IRON BINDING CAPACITY: 158 UG/DL (ref 112–347)
VITAMIN B-12: >2000 PG/ML (ref 232–1245)
WBC # BLD: 9.1 K/UL (ref 3.5–11)

## 2022-06-23 PROCEDURE — 6360000002 HC RX W HCPCS: Performed by: NURSE PRACTITIONER

## 2022-06-23 PROCEDURE — 82746 ASSAY OF FOLIC ACID SERUM: CPT

## 2022-06-23 PROCEDURE — 2060000000 HC ICU INTERMEDIATE R&B

## 2022-06-23 PROCEDURE — 94761 N-INVAS EAR/PLS OXIMETRY MLT: CPT

## 2022-06-23 PROCEDURE — 82607 VITAMIN B-12: CPT

## 2022-06-23 PROCEDURE — 85027 COMPLETE CBC AUTOMATED: CPT

## 2022-06-23 PROCEDURE — 82105 ALPHA-FETOPROTEIN SERUM: CPT

## 2022-06-23 PROCEDURE — 80048 BASIC METABOLIC PNL TOTAL CA: CPT

## 2022-06-23 PROCEDURE — 99222 1ST HOSP IP/OBS MODERATE 55: CPT | Performed by: INTERNAL MEDICINE

## 2022-06-23 PROCEDURE — 6370000000 HC RX 637 (ALT 250 FOR IP): Performed by: NURSE PRACTITIONER

## 2022-06-23 PROCEDURE — 83540 ASSAY OF IRON: CPT

## 2022-06-23 PROCEDURE — 82947 ASSAY GLUCOSE BLOOD QUANT: CPT

## 2022-06-23 PROCEDURE — 83550 IRON BINDING TEST: CPT

## 2022-06-23 PROCEDURE — 86301 IMMUNOASSAY TUMOR CA 19-9: CPT

## 2022-06-23 PROCEDURE — 83735 ASSAY OF MAGNESIUM: CPT

## 2022-06-23 PROCEDURE — 94640 AIRWAY INHALATION TREATMENT: CPT

## 2022-06-23 PROCEDURE — 36415 COLL VENOUS BLD VENIPUNCTURE: CPT

## 2022-06-23 PROCEDURE — 82728 ASSAY OF FERRITIN: CPT

## 2022-06-23 PROCEDURE — 82378 CARCINOEMBRYONIC ANTIGEN: CPT

## 2022-06-23 PROCEDURE — 2580000003 HC RX 258: Performed by: NURSE PRACTITIONER

## 2022-06-23 PROCEDURE — 80074 ACUTE HEPATITIS PANEL: CPT

## 2022-06-23 PROCEDURE — APPNB30 APP NON BILLABLE TIME 0-30 MINS: Performed by: NURSE PRACTITIONER

## 2022-06-23 PROCEDURE — 99223 1ST HOSP IP/OBS HIGH 75: CPT | Performed by: INTERNAL MEDICINE

## 2022-06-23 RX ORDER — CLOPIDOGREL BISULFATE 75 MG/1
75 TABLET ORAL DAILY
Status: DISCONTINUED | OUTPATIENT
Start: 2022-06-23 | End: 2022-06-25 | Stop reason: HOSPADM

## 2022-06-23 RX ORDER — LISINOPRIL 10 MG/1
10 TABLET ORAL DAILY
Status: DISCONTINUED | OUTPATIENT
Start: 2022-06-23 | End: 2022-06-25 | Stop reason: HOSPADM

## 2022-06-23 RX ORDER — BUDESONIDE AND FORMOTEROL FUMARATE DIHYDRATE 160; 4.5 UG/1; UG/1
2 AEROSOL RESPIRATORY (INHALATION) 2 TIMES DAILY
Status: DISCONTINUED | OUTPATIENT
Start: 2022-06-23 | End: 2022-06-25 | Stop reason: HOSPADM

## 2022-06-23 RX ORDER — PANTOPRAZOLE SODIUM 40 MG/1
40 TABLET, DELAYED RELEASE ORAL
Status: DISCONTINUED | OUTPATIENT
Start: 2022-06-23 | End: 2022-06-25 | Stop reason: HOSPADM

## 2022-06-23 RX ORDER — DEXTROSE MONOHYDRATE 50 MG/ML
100 INJECTION, SOLUTION INTRAVENOUS PRN
Status: DISCONTINUED | OUTPATIENT
Start: 2022-06-23 | End: 2022-06-25 | Stop reason: HOSPADM

## 2022-06-23 RX ORDER — POTASSIUM CHLORIDE 7.45 MG/ML
10 INJECTION INTRAVENOUS PRN
Status: DISCONTINUED | OUTPATIENT
Start: 2022-06-23 | End: 2022-06-25 | Stop reason: HOSPADM

## 2022-06-23 RX ORDER — ASPIRIN 81 MG/1
81 TABLET ORAL DAILY
Status: DISCONTINUED | OUTPATIENT
Start: 2022-06-23 | End: 2022-06-25 | Stop reason: HOSPADM

## 2022-06-23 RX ORDER — OXYBUTYNIN CHLORIDE 5 MG/1
5 TABLET ORAL 2 TIMES DAILY
Status: DISCONTINUED | OUTPATIENT
Start: 2022-06-23 | End: 2022-06-25 | Stop reason: HOSPADM

## 2022-06-23 RX ORDER — RANOLAZINE 500 MG/1
500 TABLET, EXTENDED RELEASE ORAL 2 TIMES DAILY
Status: DISCONTINUED | OUTPATIENT
Start: 2022-06-23 | End: 2022-06-25 | Stop reason: HOSPADM

## 2022-06-23 RX ORDER — TRAZODONE HYDROCHLORIDE 50 MG/1
50 TABLET ORAL NIGHTLY PRN
Status: DISCONTINUED | OUTPATIENT
Start: 2022-06-23 | End: 2022-06-25 | Stop reason: HOSPADM

## 2022-06-23 RX ORDER — MORPHINE SULFATE 2 MG/ML
2 INJECTION, SOLUTION INTRAMUSCULAR; INTRAVENOUS EVERY 4 HOURS PRN
Status: DISCONTINUED | OUTPATIENT
Start: 2022-06-23 | End: 2022-06-25 | Stop reason: HOSPADM

## 2022-06-23 RX ORDER — MORPHINE SULFATE 4 MG/ML
4 INJECTION, SOLUTION INTRAMUSCULAR; INTRAVENOUS EVERY 4 HOURS PRN
Status: DISCONTINUED | OUTPATIENT
Start: 2022-06-23 | End: 2022-06-25 | Stop reason: HOSPADM

## 2022-06-23 RX ORDER — INSULIN LISPRO 100 [IU]/ML
0-12 INJECTION, SOLUTION INTRAVENOUS; SUBCUTANEOUS
Status: DISCONTINUED | OUTPATIENT
Start: 2022-06-23 | End: 2022-06-25 | Stop reason: HOSPADM

## 2022-06-23 RX ORDER — ALBUTEROL SULFATE 90 UG/1
2 AEROSOL, METERED RESPIRATORY (INHALATION) EVERY 6 HOURS PRN
Status: DISCONTINUED | OUTPATIENT
Start: 2022-06-23 | End: 2022-06-25 | Stop reason: HOSPADM

## 2022-06-23 RX ORDER — VENLAFAXINE HYDROCHLORIDE 75 MG/1
75 CAPSULE, EXTENDED RELEASE ORAL DAILY
Status: DISCONTINUED | OUTPATIENT
Start: 2022-06-23 | End: 2022-06-25 | Stop reason: HOSPADM

## 2022-06-23 RX ORDER — METOPROLOL SUCCINATE 50 MG/1
50 TABLET, EXTENDED RELEASE ORAL DAILY
Refills: 1 | Status: DISCONTINUED | OUTPATIENT
Start: 2022-06-23 | End: 2022-06-25 | Stop reason: HOSPADM

## 2022-06-23 RX ORDER — GABAPENTIN 600 MG/1
600 TABLET ORAL 3 TIMES DAILY
Status: DISCONTINUED | OUTPATIENT
Start: 2022-06-23 | End: 2022-06-25 | Stop reason: HOSPADM

## 2022-06-23 RX ORDER — INSULIN LISPRO 100 [IU]/ML
0-6 INJECTION, SOLUTION INTRAVENOUS; SUBCUTANEOUS NIGHTLY
Status: DISCONTINUED | OUTPATIENT
Start: 2022-06-23 | End: 2022-06-25 | Stop reason: HOSPADM

## 2022-06-23 RX ORDER — FLUTICASONE PROPIONATE 50 MCG
2 SPRAY, SUSPENSION (ML) NASAL DAILY
Status: DISCONTINUED | OUTPATIENT
Start: 2022-06-23 | End: 2022-06-25 | Stop reason: HOSPADM

## 2022-06-23 RX ORDER — VENLAFAXINE HYDROCHLORIDE 150 MG/1
150 CAPSULE, EXTENDED RELEASE ORAL DAILY
Status: DISCONTINUED | OUTPATIENT
Start: 2022-06-23 | End: 2022-06-25 | Stop reason: HOSPADM

## 2022-06-23 RX ORDER — ARIPIPRAZOLE 5 MG/1
5 TABLET ORAL DAILY
Status: DISCONTINUED | OUTPATIENT
Start: 2022-06-23 | End: 2022-06-25 | Stop reason: HOSPADM

## 2022-06-23 RX ORDER — ATORVASTATIN CALCIUM 80 MG/1
80 TABLET, FILM COATED ORAL DAILY
Status: DISCONTINUED | OUTPATIENT
Start: 2022-06-23 | End: 2022-06-25 | Stop reason: HOSPADM

## 2022-06-23 RX ADMIN — SODIUM CHLORIDE: 9 INJECTION, SOLUTION INTRAVENOUS at 00:32

## 2022-06-23 RX ADMIN — MORPHINE SULFATE 4 MG: 4 INJECTION, SOLUTION INTRAMUSCULAR; INTRAVENOUS at 07:05

## 2022-06-23 RX ADMIN — BUDESONIDE AND FORMOTEROL FUMARATE DIHYDRATE 2 PUFF: 160; 4.5 AEROSOL RESPIRATORY (INHALATION) at 08:11

## 2022-06-23 RX ADMIN — OXYBUTYNIN CHLORIDE 5 MG: 5 TABLET ORAL at 20:35

## 2022-06-23 RX ADMIN — INSULIN LISPRO 2 UNITS: 100 INJECTION, SOLUTION INTRAVENOUS; SUBCUTANEOUS at 17:38

## 2022-06-23 RX ADMIN — RANOLAZINE 500 MG: 500 TABLET, EXTENDED RELEASE ORAL at 20:35

## 2022-06-23 RX ADMIN — ARIPIPRAZOLE 5 MG: 5 TABLET ORAL at 14:03

## 2022-06-23 RX ADMIN — BUDESONIDE AND FORMOTEROL FUMARATE DIHYDRATE 2 PUFF: 160; 4.5 AEROSOL RESPIRATORY (INHALATION) at 20:29

## 2022-06-23 RX ADMIN — TIOTROPIUM BROMIDE INHALATION SPRAY 2 PUFF: 3.12 SPRAY, METERED RESPIRATORY (INHALATION) at 08:11

## 2022-06-23 RX ADMIN — INSULIN LISPRO 1 UNITS: 100 INJECTION, SOLUTION INTRAVENOUS; SUBCUTANEOUS at 20:39

## 2022-06-23 RX ADMIN — SODIUM CHLORIDE: 9 INJECTION, SOLUTION INTRAVENOUS at 20:33

## 2022-06-23 RX ADMIN — MORPHINE SULFATE 4 MG: 4 INJECTION, SOLUTION INTRAMUSCULAR; INTRAVENOUS at 12:19

## 2022-06-23 RX ADMIN — POTASSIUM CHLORIDE: 2 INJECTION, SOLUTION, CONCENTRATE INTRAVENOUS at 01:03

## 2022-06-23 RX ADMIN — SODIUM CHLORIDE, PRESERVATIVE FREE 10 ML: 5 INJECTION INTRAVENOUS at 20:35

## 2022-06-23 RX ADMIN — VENLAFAXINE HYDROCHLORIDE 75 MG: 75 CAPSULE, EXTENDED RELEASE ORAL at 14:05

## 2022-06-23 RX ADMIN — MORPHINE SULFATE 4 MG: 4 INJECTION, SOLUTION INTRAMUSCULAR; INTRAVENOUS at 16:38

## 2022-06-23 RX ADMIN — GABAPENTIN 600 MG: 600 TABLET, FILM COATED ORAL at 20:35

## 2022-06-23 RX ADMIN — GABAPENTIN 600 MG: 600 TABLET, FILM COATED ORAL at 13:54

## 2022-06-23 RX ADMIN — VENLAFAXINE HYDROCHLORIDE 150 MG: 150 CAPSULE, EXTENDED RELEASE ORAL at 14:04

## 2022-06-23 RX ADMIN — MORPHINE SULFATE 4 MG: 4 INJECTION, SOLUTION INTRAMUSCULAR; INTRAVENOUS at 20:35

## 2022-06-23 RX ADMIN — MORPHINE SULFATE 4 MG: 4 INJECTION, SOLUTION INTRAMUSCULAR; INTRAVENOUS at 00:45

## 2022-06-23 ASSESSMENT — PAIN SCALES - GENERAL
PAINLEVEL_OUTOF10: 6
PAINLEVEL_OUTOF10: 6
PAINLEVEL_OUTOF10: 4
PAINLEVEL_OUTOF10: 6
PAINLEVEL_OUTOF10: 5
PAINLEVEL_OUTOF10: 6
PAINLEVEL_OUTOF10: 6
PAINLEVEL_OUTOF10: 8
PAINLEVEL_OUTOF10: 4

## 2022-06-23 ASSESSMENT — ENCOUNTER SYMPTOMS
ABDOMINAL PAIN: 1
DIARRHEA: 0
COUGH: 0
BACK PAIN: 0
CONSTIPATION: 0
SORE THROAT: 0
VOMITING: 1
NAUSEA: 1
WHEEZING: 0
SHORTNESS OF BREATH: 0

## 2022-06-23 ASSESSMENT — PAIN DESCRIPTION - LOCATION
LOCATION: ABDOMEN

## 2022-06-23 ASSESSMENT — PAIN DESCRIPTION - DESCRIPTORS
DESCRIPTORS: ACHING
DESCRIPTORS: ACHING

## 2022-06-23 ASSESSMENT — PAIN DESCRIPTION - FREQUENCY: FREQUENCY: CONTINUOUS

## 2022-06-23 ASSESSMENT — PAIN DESCRIPTION - ORIENTATION: ORIENTATION: LOWER

## 2022-06-23 ASSESSMENT — PAIN DESCRIPTION - PAIN TYPE: TYPE: ACUTE PAIN

## 2022-06-23 ASSESSMENT — PAIN - FUNCTIONAL ASSESSMENT: PAIN_FUNCTIONAL_ASSESSMENT: ACTIVITIES ARE NOT PREVENTED

## 2022-06-23 ASSESSMENT — PAIN DESCRIPTION - ONSET: ONSET: ON-GOING

## 2022-06-23 NOTE — PLAN OF CARE
PRE CONSULT ROUNDING NOTE  HPI  81year old female with pmh of copd cad on aspirin plavix dmt2 htn who presented to the ED for abdominal pain with emesis. Our service is consulted for choledocholithiasis. Reports a one month hx of diffuse abdominal pain without radiation with nausea and bilious emesis. Took tylenol with no improvement. 30# weight loss in the last several months. CT abd shows     Lower Chest: Small right pleural effusion.       Organs: Multiple suspicious liver lesions are present throughout the liver   with confluent mass and capsular retraction measuring up to 5.5 cm involving   segment 4/5.  The gallbladder is not discretely visualized and may be   directly involved.  There also appears to be soft tissue extension abutting   the stomach and duodenum on axial image 63.       Biliary dilatation is present with choledocholithiasis noted in the distal   common duct.       The pancreas, spleen, adrenals and kidneys reveal no acute findings.       GI/Bowel: There is no bowel dilatation or wall thickening identified.       Pelvis: No acute findings.       Peritoneum/Retroperitoneum: Soft tissue nodularity in the right abdomen is   noted, compatible with peritoneal implants.  Trace ascites.  No free air or   loculated fluid collection.       Infrarenal aortic aneurysm measures up to 3.1 cm.       Bones/Soft Tissues: No abnormality identified.  Benign-appearing rim   calcification in the right breast.             The pt does not have leucocytosis, hgb 11.2 with platelet of 837 iron 24 with 13% saturation lft are normal . She has not had fevers chills dysphagia melena hematemesis hematochezia change in the bowel habits or rash. Denies prior liver and gallbladder disease.      Endoscopy none  Family reports no hx of liver pancreatic stomach or colon cancer  Social quit  smoking denies  etoh admits to marijuana use   BP (!) 108/54   Pulse 92   Temp 98.2 °F (36.8 °C) (Oral)   Resp 18   Ht 5' (1.524 m)   Wt 186 lb 1.1 oz (84.4 kg)   SpO2 92%   BMI 36.34 kg/m²     ROS as above meds labs imaging and past medical records were reviewed    Exam  General Appearance: alert and oriented to person, place and time, well-developed and well-nourished, in no acute distress  Skin: warm and dry, no rash or erythema  Head: normocephalic and atraumatic  Eyes: pupils equal, round, and reactive to light, extraocular eye movements intact, conjunctivae normal  ENT: hearing grossly normal bilaterally  Neck: neck supple and non tender without mass, no thyromegaly or thyroid nodules, no cervical lymphadenopathy   Pulmonary/Chest: clear to auscultation bilaterally- no wheezes, rales or rhonchi, normal air movement, no respiratory distress  Cardiovascular: normal rate, regular rhythm, normal S1 and S2, no murmurs, rubs, clicks or gallops, distal pulses intact, no carotid bruits  Abdomen: soft, obese mild diffuse tenderness, non-distended, normal bowel sounds, no masses or organomegaly no ascites  Extremities: no cyanosis, clubbing or edema  Musculoskeletal: normal range of motion, no joint swelling, deformity or tenderness  Neurologic: no cranial nerve deficit and muscle strength normal    Assessment  Abdominal pain with nausea emesis and weight loss, imaging showing multiple liver masses, peritoneal carcinomatosis with peritoneal implants and  Choledocholithiasis-suspicious for malignancy  Iron deficiency Anemia with thrombocytosis  Weight loss    Plan   d/w md  Pt needs mri abd with contrast with liver lesion protocol however she is refusing to take out her metal facial  And tongue piercing's  Will order tumor markers   discussed  ercp with md, lft's are normal, at this time no ercp needed  recc oncology consult ? bx  Pain and nausea mgt  Iron replacement  Formal gi consult to follow  . Rylan Davidson Melania Seats, APRN - CNP

## 2022-06-23 NOTE — ED NOTES
Patient ambulatory to bathroom independently with steady gait. Able to provide urine sample.      Jamila Rachel RN  06/22/22 2004

## 2022-06-23 NOTE — PROGRESS NOTES
Patient admitted per wheelchair to room 2088. Patient alert and oriented. VS taken and telemetry applied. Bed alarm placed. Oriented to room. Denies nausea at present but complains of epigastric tenderness. Instructed to call nurse if she has to get out of bed.

## 2022-06-23 NOTE — PLAN OF CARE
Problem: Discharge Planning  Goal: Discharge to home or other facility with appropriate resources  Outcome: Progressing     Problem: Safety - Adult  Goal: Free from fall injury  Outcome: Progressing     Problem: Skin/Tissue Integrity  Goal: Absence of new skin breakdown  Description: 1. Monitor for areas of redness and/or skin breakdown  2. Assess vascular access sites hourly  3. Every 4-6 hours minimum:  Change oxygen saturation probe site  4. Every 4-6 hours:  If on nasal continuous positive airway pressure, respiratory therapy assess nares and determine need for appliance change or resting period.   Outcome: Progressing     Problem: Pain  Goal: Verbalizes/displays adequate comfort level or baseline comfort level  Outcome: Progressing     Problem: Nutrition Deficit:  Goal: Optimize nutritional status  Outcome: Progressing

## 2022-06-23 NOTE — PROGRESS NOTES
Pt was NPO this am. She is struggling with the potential of her having cancer. She asked for her Effexor and Abilify to ensure emotional stability and to not experience additional mood changes.

## 2022-06-23 NOTE — ED NOTES
Dr. Mari Potter at bedside for results update. POC includes admission.       Cecily Villagran RN  06/22/22 3428

## 2022-06-23 NOTE — FLOWSHEET NOTE
Patient came to hospital because she thought she might have covid and has now been given dx of cancer. She is in shock, trying to process the little info she has right now as she hasn't seen oncologist yet to understand full extent of her condition. She spoke of family dynamics and her perspective on life. She is spiritual but did not want prayer at this time. We discussed her perspectives on end of life and also writer gave pt info on HPOA and living will-packet left with her to read over. She is thinking of having one of her sons be her POA as she believes he will follow her wishes and not sure her daughter would. Pt is overwhelmed with all of the info, all of the questions and all of the unknown at this point. Writer noted chaplains are available to listen or to complete the paperwork when she's ready. Also let her know chaplains can talk with her children if that will help to review living will and HPOA. Writer also explained palliative care would be talking with her. She appreciated the information, emotional support, and listening presence. 06/23/22 1659   Encounter Summary   Encounter Overview/Reason  Initial Encounter;Spiritual/Emotional Needs   Service Provided For: Patient   Referral/Consult From: Palliative Care   Support System Children;Friends/neighbors   Last Encounter  06/23/22   Complexity of Encounter High   Begin Time 1600   End Time  1630   Total Time Calculated 30 min   Spiritual/Emotional needs   Type Emotional Distress   Grief, Loss, and Adjustments   Type New Diagnosis   Palliative Care   Type Palliative Care, Initial/Spiritual Assessment   Advance Care Planning   Type ACP conversation   Assessment/Intervention/Outcome   Assessment Shock; Anxious; Impaired resilience   Intervention Active listening;Discussed belief system/Hindu practices/michelle;Discussed illness injury and its impact; Explored/Affirmed feelings, thoughts, concerns;Explored Coping Skills/Resources; Life review/Legacy; Sustaining Presence/Ministry of presence   Outcome Engaged in conversation;Expressed feelings, needs, and concerns;Expressed Gratitude;New perspective/awareness;Receptive;Venting emotion

## 2022-06-23 NOTE — PLAN OF CARE
Problem: Discharge Planning  Goal: Discharge to home or other facility with appropriate resources  Outcome: Progressing     Problem: Safety - Adult  Goal: Free from fall injury  Outcome: Progressing  Note: Patient weak. Assisted to bathroom. Bed alarm on. Instructed to call nurse if she has to get up. Problem: Skin/Tissue Integrity  Goal: Absence of new skin breakdown  Description: 1. Monitor for areas of redness and/or skin breakdown  2. Assess vascular access sites hourly  3. Every 4-6 hours minimum:  Change oxygen saturation probe site  4. Every 4-6 hours:  If on nasal continuous positive airway pressure, respiratory therapy assess nares and determine need for appliance change or resting period. Outcome: Progressing  Note: Buttocks slightly reddened. Able to turn self in bed. Problem: Pain  Goal: Verbalizes/displays adequate comfort level or baseline comfort level  Outcome: Progressing  Note: Patient with mid upper abdomen pain. Medicted with morphine with relief.

## 2022-06-23 NOTE — PROGRESS NOTES
Palliative Care update:  Received consult from Dr. Cj Dodd. Update received from [facility] [email or web address]. Pt just received diagnosis of cancer. Pt is very emotional/overwhelmed. Ebony Octavio relates that it may be appropriate to wait and see patient tomorrow. Will follow up with her tomorrow.       [address] [facility] Coordinator  Yoshi Parker BSN, [facility]  [address] [facility] [address] [place] [address] [phone]

## 2022-06-23 NOTE — ACP (ADVANCE CARE PLANNING)
Advance Care Planning     Advance Care Planning Activator (Inpatient)  Conversation Note      Date of ACP Conversation: 6/23/2022     Conversation Conducted with: Patient with Decision Making Capacity    ACP Activator: Nuris Smiley RN        Health Care Decision Maker:     Current Designated Health Care Decision Maker:     Click here to complete Healthcare Decision Makers including section of the Healthcare Decision Maker Relationship (ie \"Primary\")  Today we documented desired Decision Maker(s), who is (are) NOT Legal Next [facility]. ACP documents are required for decision maker authority. Care Preferences    Ventilation: \"If you were in your present state of health and suddenly became very ill and were unable to breathe on your own, what would your preference be about the use of a ventilator (breathing machine) if it were available to you? \"      Would the patient desire the use of ventilator (breathing machine)?: no    \"If your health worsens and it becomes clear that your chance of recovery is unlikely, what would your preference be about the use of a ventilator (breathing machine) if it were available to you? \"     Would the patient desire the use of ventilator (breathing machine)?: No      Resuscitation  \"CPR works best to restart the heart when there is a sudden event, like a heart attack, in someone who is otherwise healthy. Unfortunately, CPR does not typically restart the heart for people who have serious health conditions or who are very sick. \"    \"In the event your heart stopped as a result of an underlying serious health condition, would you want attempts to be made to restart your heart (answer \"yes\" for attempt to resuscitate) or would you prefer a natural death (answer \"no\" for do not attempt to resuscitate)? \" no       [] Yes   [] No   Educated Patient / Karmen Torreso regarding differences between Advance Directives and portable DNR orders.     Length of ACP Conversation in minutes: Conversation Outcomes:  [] ACP discussion completed  [] Existing advance directive reviewed with patient; no changes to patient's previously recorded wishes  [] New Advance Directive completed  [] Portable Do Not Rescitate prepared for Provider review and signature  [] POLST/POST/MOLST/MOST prepared for Provider review and signature      Follow-up plan:    [] Schedule follow-up conversation to continue planning  [] Referred individual to Provider for additional questions/concerns   [] Advised patient/agent/surrogate to review completed ACP document and update if needed with changes in condition, patient preferences or care setting    [] This note routed to one or more involved healthcare providers        Notified CM with rounding physician

## 2022-06-23 NOTE — PROGRESS NOTES
Comprehensive Nutrition Assessment    Type and Reason for Visit:  Initial,Positive Nutrition Screen (wt loss, poor appetite)    Nutrition Recommendations/Plan:   1. Will continue to provide Regular diet and monitor labs  2. Will add Ensure High Protein supplements to all trays     Malnutrition Assessment:  Malnutrition Status: At risk for malnutrition (Comment) (06/23/22 7726)    Context:  Acute Illness     Findings of the 6 clinical characteristics of malnutrition:  Energy Intake:  50% or less of estimated energy requirements for 5 or more days  Weight Loss:  Unable to assess     Body Fat Loss:  Unable to assess     Muscle Mass Loss:  Unable to assess    Fluid Accumulation:   (mild to moderate) Extremities   Strength:  Not Performed    Nutrition Assessment:    Pt was admitted after 1 weeks of N/V/Diarrhea/Decreased appetite and found to have metastatic liver disease. Unable to verify wt loss due to no recent documented wt history. Nutrition Related Findings:    pitting edema: RLE +1, LLE +2, Labs: Glu 105, meds: Zofran, PMH: DM, COPD, CHF Wound Type: None       Current Nutrition Intake & Therapies:    Average Meal Intake: Unable to assess     ADULT DIET; Regular  ADULT ORAL NUTRITION SUPPLEMENT; Breakfast, Lunch, Dinner; Low Calorie/High Protein Oral Supplement    Anthropometric Measures:  Height: 5' (152.4 cm)  Ideal Body Weight (IBW): 100 lbs (45 kg)    Admission Body Weight: 186 lb (84.4 kg)  Current Body Weight: 186 lb (84.4 kg),   IBW.  Weight Source: Bed Scale  Current BMI (kg/m2): 36.3  Usual Body Weight:  (215# (11/20))                       BMI Categories: Obese Class 2 (BMI 35.0 -39.9)    Estimated Daily Nutrient Needs:  Energy Requirements Based On: Formula  Weight Used for Energy Requirements: Admission  Energy (kcal/day): [place] x 1.3= 3734-8196 kcal  Weight Used for Protein Requirements: Current  Protein (g/day): 1.5g/k= 125 g       Nutrition Diagnosis:   · Predicted inadequate energy intake related to  (current medical condition) as evidenced by poor intake prior to [facility] Interventions:   Food and/or Nutrient Delivery: Continue Current Diet,Start Oral Nutrition Supplement  Nutrition Education/Counseling: No recommendation at this time  Coordination of Nutrition Care: Continue to monitor while inpatient       Goals:     Goals: PO intake 50% or greater       Nutrition Monitoring and Evaluation:      Food/Nutrient Intake Outcomes: Food and Nutrient Intake,Supplement Intake  Physical Signs/Symptoms Outcomes: Biochemical Data,GI Status,Fluid Status or Edema,Nausea or Vomiting,Skin,Weight    Discharge Planning:    Continue current diet     Dawood Tobias, [address] [place] [address],   Contact: [phone]

## 2022-06-23 NOTE — CARE COORDINATION
CASE MANAGEMENT NOTE:    Admission Date:  6/22/2022 Marcia Nielsen is a 81 y.o.  female    Admitted for : Hypokalemia [E87.6]    Met with:  Patient    PCP:  Torri Alonso MD                                Insurance:  [place]      Is patient alert and oriented at time of discussion:  Yes    Current Residence/ Living Arrangements:  independently at home             Current Services PTA:  No    Does patient go to outpatient dialysis: No  If yes, location and chair time:     Is patient agreeable to VNS: No    Freedom of choice provided:  No    List of [address] provided: No    VNS chosen:  No    DME:  [employer] shower chair grab bar, glucometer **needs supplies    Home Oxygen: No    Nebulizer: No    CPAP/BIPAP: No    Supplier: N/A    Potential Assistance Needed: Yes needs glucometer supplies on discharge    SNF needed: No    Freedom of choice and list provided: No    Pharmacy:  [facility]       Is patient currently receiving oral anticoagulation therapy? No    Is the Patient an [facility] with Readmission Risk Score greater than 14%? No  If yes, pt needs a follow up appointment made within 7 days. Family Members/Caregivers that pt would like involved in their care:    Yes    If yes, list name here:  Shirlene Harman friend    Transportation Provider:  Family and Friends             Discharge Plan:  06/23. [place]. Pt from home with friend, family and family transport. DME; walker, cane, SC, GB, glucometer - needs supplies on discharge. PT/OT on board. GI consulted. MRI AB pending. NPO. ORANGE HEADER 13%.  KIM WILL NEED SIGNED/COMPLETED. //SS                 Electronically signed by: Dawit Pena RN on 6/23/2022 at 11:12 AM

## 2022-06-23 NOTE — PLAN OF CARE
After I saw the MRI screening form was complete this morning I called the floor to get transport information on the patient. We were planning on sending for the patient around noon. I spoke to the patients nurse [facility] she said the patient is refusing to remove her piercing's for the exam. [facility] even had the doctor speak with the patient. [facility] also told the patient she cannot be scanned in MRI unless she removes the piercing's due to safety reasons. I told [facility] to give us a call in MRI at [phone] if any thing changes.

## 2022-06-23 NOTE — H&P
[address] [facility]     HISTORY AND PHYSICAL EXAMINATION            Date:   6/23/2022  Patientname: Sher Vila  Date of admission:  6/22/2022  2:48 PM  MRN:   [id number]  Account:  [de-identified]  YOB: 1950  PCP:    Dmitry Glasgow MD  Room:   6191/7025-48  Code Status:    Full Code    CHIEF COMPLAINT     Chief Complaint   Patient presents with    Abdominal Pain    Emesis       HISTORY OF PRESENT ILLNESS  (Character, Onset, Location, Duration,  Exacerbating/RelievingFactors, Radiation,   Associated Symptoms, Severity )      The patient is a 81 y.o.  female, with a history of COPD, CAD, DM type II, HTN, morbid obesity, and SAGAR, who presents with abdominal pain and emesis. According to patient, she has been having abdominal pain, nausea, and vomiting with dry heaving for the past week and states that tonight she threw up all of her medications so she came to the ED for evaluation. Patient reports constant, nonradiating, stabbing, epigastric pain that waxes and wanes but never completely resolves. Symptoms are associated with poor p.o. appetite. Denies fever, chills, chest pain, cough, diarrhea, and urinary symptoms. Symptoms are aggravated by eating and mildly improved with position and distraction. Pain is not relieved by vomiting. Patient denies prior history of similar symptoms. Symptoms are reported as constant and moderate to severe. PAST MEDICAL HISTORY   Patient  has a past medical history of Acid reflux, Arthritis, Cataracts, bilateral, CHF (congestive heart failure) ([address] COPD (chronic obstructive pulmonary disease) ([address] Gall stones, Head injury, Hyperlipidemia, Hypertension, Insomnia, MI (myocardial infarction) ([address] Neuropathy, Sleep apnea, and Type 2 diabetes mellitus without complication ([address] PAST SURGICAL HISTORY    Patient  has a past surgical history that includes Femur Surgery;  Foot surgery (Left); Mandible fracture surgery; Cardiac surgery (1999); Ankle surgery (Left); Tonsillectomy; and Coronary angioplasty with stent (1999). FAMILY HISTORY    Patient family history includes Other in her father; Stroke in her mother. SOCIAL HISTORY    Patient  reports that she quit smoking about 22 years ago. Her smoking use included cigarettes. She has a 40.00 pack-year smoking history. She has never used smokeless tobacco. She reports current alcohol use of about 1.0 standard drink of alcohol per week. She reports current drug use. Drug: Marijuana [place]). HOME MEDICATIONS        Prior to Admission medications    Medication Sig Start Date End Date Taking? Authorizing Provider   gabapentin (NEURONTIN) 600 MG tablet Take 600 mg by mouth 3 times daily.    Yes Historical Provider, MD   albuterol sulfate  (90 Base) MCG/ACT inhaler Inhale 2 puffs into the lungs every 6 hours as needed for Wheezing or Shortness of Breath 5/26/21   Carol Gift, APRN - CNP   ranolazine (RANEXA) 500 MG extended release tablet Take 1 tablet by mouth 2 times daily 3/12/21   Carol Gift, BENNETT - CNP   lisinopril (PRINIVIL;ZESTRIL) 10 MG tablet take 1 tablet by mouth once daily 3/12/21   Caorl Gift, BENNETT - CNP   venlafaxine (EFFEXOR XR) 150 MG extended release capsule Take 1 capsule by mouth daily Take with 75 mg capsule 2/26/21   Carol Gift, BENNETT - CNP   ARIPiprazole (ABILIFY) 5 MG tablet Take 1 tablet by mouth daily 2/26/21   Carol Gift, APRN - JOEL   budesonide-formoterol [facility]) 160-4.5 MCG/ACT AERO Inhale 2 puffs into the lungs 2 times daily 2/26/21   Carol Gift, APRN - CNP   tiotropium (SPIRIVA RESPIMAT) 2.5 MCG/ACT AERS inhaler Inhale 2 puffs into the lungs daily 2/26/21   Carol Shyanne, BENNETT - CNP   traZODone (DESYREL) 50 MG tablet take 1 tablet by mouth at bedtime if needed for sleep 12/30/20   Historical Provider, MD   oxybutynin (DITROPAN) 5 MG tablet take 1 tablet by mouth twice a day 2/26/21   Carol Shyanne, APRN - JOEL glucose monitoring kit (FREESTYLE) monitoring kit 1 kit by Does not apply route daily 10/3/20   Winfield , APRN - CNP   blood glucose monitor strips Test 3 times a day & as needed for symptoms of irregular blood glucose. Dispense sufficient amount for indicated testing frequency plus additional to accommodate PRN testing needs.  10/3/20   Winfield , BENNETT - CNP   Lancets MISC 1 each by Does not apply route 3 times daily 10/3/20   Franklin , APRN - CNP   atorvastatin (LIPITOR) 80 MG tablet Take 1 tablet by mouth daily 9/30/20   Franklin , APRN - CNP   bisoprolol (ZEBETA) 5 MG tablet take 1 tablet by mouth once daily 9/30/20   Franklin , APRN - CNP   omeprazole (PRILOSEC) 40 MG delayed release capsule take 1 capsule by mouth every morning BEFORE BREAKFAST 9/30/20   Franklin , APRN - CNP   clopidogrel (PLAVIX) 75 MG tablet take 1 tablet by mouth once daily 9/30/20   Winfield , APRN - CNP   venlafaxine (EFFEXOR XR) 75 MG extended release capsule Take 1 capsule by mouth daily 9/30/20   Franklin , APRN - CNP   glimepiride (AMARYL) 2 MG tablet take 1 tablet by mouth twice a day before meals 9/30/20   Winfield , APRN - CNP   Semaglutide, 1 MG/DOSE, (OZEMPIC, 1 MG/DOSE,) 2 MG/1.5ML SOPN Inject 1 mg per week 9/30/20   Franklin , APRN - CNP   Multiple Vitamins-Minerals (THERAPEUTIC MULTIVITAMIN-MINERALS) tablet Take 1 tablet by mouth daily 9/30/20   Franklin , APRN - CNP   Melatonin 5 MG SUBL Place 1 applicator under the tongue nightly as needed (insomnia) 9/30/20   Winfield , APRN - CNP   Insulin Pen Needle 32G X 4 MM MISC 1 each by Does not apply route once a week 3/12/20   Mir Roberts MD   fluticasone [facility]) 50 MCG/ACT nasal spray 2 sprays by Nasal route daily 9/13/19   Mir Roberts MD   aspirin 81 MG tablet Take 81 mg by mouth daily    Historical Provider, MD       ALLERGIES      Chantix [varenicline tartrate] and Sulphadimidine [sulfamethazine]    REVIEW OF SYSTEMS     Review of Systems   Constitutional: Positive for appetite change. Negative for chills, diaphoresis and fever. HENT: Negative for congestion and sore throat. Respiratory: Negative for cough, shortness of breath and wheezing. Cardiovascular: Negative for chest pain, palpitations and leg swelling. Gastrointestinal: Positive for abdominal pain, nausea and vomiting. Negative for constipation and diarrhea. Genitourinary: Negative for dysuria, frequency and urgency. Musculoskeletal: Negative for back pain and myalgias. Skin: Negative for rash. Neurological: Negative for dizziness, weakness and headaches. Psychiatric/Behavioral: The patient is not nervous/anxious. PHYSICAL EXAM      BP 95/61   Pulse 88   Temp 97.6 °F (36.4 °C) (Oral)   Resp 18   Ht 5' (1.524 m)   Wt 175 lb (79.4 kg)   SpO2 95%   BMI 34.18 kg/m²  Body mass index is 34.18 kg/m². Physical Exam  Constitutional:       General: She is not in acute distress. Appearance: She is well-developed. She is obese. She is not diaphoretic. HENT:      Head: Normocephalic and atraumatic. Eyes:      Conjunctiva/sclera: Conjunctivae normal.      Pupils: Pupils are equal, round, and reactive to light. Neck:      Trachea: No tracheal deviation. Cardiovascular:      Rate and Rhythm: Normal rate and regular rhythm. Heart sounds: Normal heart sounds. No murmur heard. No friction rub. No gallop. Pulmonary:      Effort: Pulmonary effort is normal. No respiratory distress. Breath sounds: Normal breath sounds. No wheezing or rales. Chest:      Chest wall: No tenderness. Abdominal:      General: Bowel sounds are normal. There is no distension. Palpations: Abdomen is soft. Tenderness: There is abdominal tenderness (Diffuse abdominal tenderness, worse in epigastrium). There is no guarding. Musculoskeletal:         General: No tenderness. Normal range of motion.       Cervical back: Normal range of motion and neck supple. Lymphadenopathy:      Cervical: No cervical adenopathy. Skin:     General: Skin is warm and dry. Coloration: Skin is not pale. Findings: No erythema or rash. Neurological:      Mental Status: She is alert and oriented to person, place, and time. Motor: No seizure activity. Coordination: Coordination normal.   Psychiatric:         Behavior: Behavior normal.         Thought Content: Thought content normal.       DIAGNOSTICS      EKG:   EKG 12 Lead [[id number]]    Collected: 06/22/22 1541    Updated: 06/22/22 1542     Ventricular Rate 63 BPM    Atrial Rate 63 BPM    P-R Interval 146 ms    QRS Duration 124 ms    Q-T Interval 558 ms    QTc Calculation (Bazett) 571 ms    P Axis 65 degrees    R Axis 50 degrees    T Axis 119 degrees   Narrative:     * Poor data quality, interpretation may be adversely affected   Normal sinus rhythm   Cannot rule out Inferior infarct , age undetermined   Cannot rule out Anterior infarct , age undetermined   ST & T wave abnormality, consider lateral ischemia   Abnormal ECG   No previous ECGs available     Labs:  CBC:   Recent Labs     06/22/22  1545   WBC 13.3*   HGB 12.7   *     BMP:    Recent Labs     06/22/22  1545 06/22/22  2138     --    K 2.7* 3.2*   CL 91*  --    CO2 29  --    BUN 8  --    CREATININE 0.69  --    GLUCOSE 125*  --      S. Calcium:  Recent Labs     06/22/22  1545   CALCIUM 8.9     S. Ionized Calcium:No results for input(s): IONCA in the last 72 hours. S. Magnesium:No results for input(s): MG in the last 72 hours. S. Phosphorus:No results for input(s): PHOS in the last 72 hours.   S. Glucose:  Recent Labs     06/23/22  0001   POCGLU 87     Glycosylated hemoglobin A1C:   Lab Results   Component Value Date    LABA1C 6.3 06/03/2021     Hepatic:   Recent Labs     06/22/22  1545   AST 26   ALT 9   ALKPHOS 96     CARDIAC ENZY:   Recent Labs     06/22/22  1545 06/22/22  1923   TROPHS 11 13     INR: No results for input(s): INR in the last 72 hours. BNP: No results for input(s): PROBNP in the last 72 hours. ABGs: No results for input(s): PH, PCO2, PO2, HCO3, O2SAT in the last 72 hours. Lipids: No results for input(s): CHOL, TRIG, HDL, LDL, LDLCALC in the last 72 hours. Pancreatic functions:  Recent Labs     06/22/22  1545   LIPASE 24     S. LacticAcid:   Recent Labs     06/22/22  1545   LACTA 1.9     Thyroid functions:   Lab Results   Component Value Date    TSH 0.84 11/04/2020      U/A:  Recent Labs     06/22/22  2000   COLORU Dark Yellow*   WBCUA 0 TO 2   RBCUA 0 TO 2   BACTERIA None   SPECGRAV 1.060*   LEUKOCYTESUR TRACE*   GLUCOSEU NEGATIVE     No results for input(s): COVID19 in the last 72 hours. Imaging/Diagonstics:     CT ABDOMEN PELVIS W IV CONTRAST Additional Contrast? None    Result Date: 6/22/2022  EXAMINATION: CT OF THE ABDOMEN AND PELVIS WITH CONTRAST 6/22/2022 5:49 pm TECHNIQUE: CT of the abdomen and pelvis was performed with the administration of intravenous contrast. Multiplanar reformatted images are provided for review. Automated exposure control, iterative reconstruction, and/or weight based adjustment of the mA/kV was utilized to reduce the radiation dose to as low as reasonably achievable. COMPARISON: None. HISTORY: ORDERING SYSTEM PROVIDED HISTORY: abd pain TECHNOLOGIST PROVIDED HISTORY: abd pain Decision Support Exception - unselect if not a suspected or confirmed emergency medical condition->Emergency Medical Condition (MA) Reason for Exam: abd pain Additional signs and symptoms: emisis x1 wk, e coli FINDINGS: Lower Chest: Small right pleural effusion. Organs: Multiple suspicious liver lesions are present throughout the liver with confluent mass and capsular retraction measuring up to 5.5 cm involving segment 4/5. The gallbladder is not discretely visualized and may be directly involved. There also appears to be soft tissue extension abutting the stomach and duodenum on axial image 63.  Biliary dilatation is present with choledocholithiasis noted in the distal common duct. The pancreas, spleen, adrenals and kidneys reveal no acute findings. GI/Bowel: There is no bowel dilatation or wall thickening identified. Pelvis: No acute findings. Peritoneum/Retroperitoneum: Soft tissue nodularity in the right abdomen is noted, compatible with peritoneal implants. Trace ascites. No free air or loculated fluid collection. Infrarenal aortic aneurysm measures up to 3.1 cm. Bones/Soft Tissues: No abnormality identified. Benign-appearing rim calcification in the right breast.     1.  Findings compatible with metastatic disease in the liver. The gallbladder is not discretely visualized separate from this locally aggressive process, which may be directly involved or the primary source of disease. Direct extension from the liver appears to involve the adjacent stomach and duodenum. 2.  Findings compatible with peritoneal carcinomatosis with discrete peritoneal implants. Trace abdominopelvic ascites. 3.  Choledocholithiasis with biliary dilatation. 4.  Infrarenal aortic aneurysm measuring up to 3.1 cm. Please see recommendation below. RECOMMENDATIONS: 3.1 cm infrarenal abdominal aortic aneurysm. Recommend follow-up every 3 years. Reference: J Am Vish Radiol 4010;31:[id number]. ASSESSMENT  and  PLAN     Principal Problem:    Hypokalemia  Active Problems:    Diabetes mellitus type 2 in obese ([address]    Morbid obesity ([address]  Resolved Problems:    * No resolved hospital problems.  *    Plan:    Hypokalemia  -K+ - 2.7 on arrival  -K+ 40 mEq po and 40 mEq IV in ED  --K+ 3.2 on recheck; additional 40 meq administered IV  -Check magnesium level in am  -K+ replacement protocol  -recheck BMP in am    Abdominal Pain  -CT abdomen/pelvis -it is compatible with metastatic disease in the liver  --Gallbladder is not discretely visualized  ---May be directly involved or primary source of disease  --Direct extension from liver appears to involve adjacent stomach and duodenum  --Findings compatible with peritoneal carcinomatosis with discrete peritoneal implants  --Trace abdominal pelvic ascites  --Choledocholithiasis with biliary ideation  ---HFP WNL  --Infrarenal abdominal aortic aneurysm 3.1 cm  ---Recommend follow-up every 3 years  -Patient denies prior history of cancer  -Denies urinary symptoms  -Lipase 24  -Consult GI  --NPO after midnight  -Consider oncology consult    Resolving UTI  -E. coli UTI on 6/15  -Treated with ciprofloxacin twice daily x5 days  -UA in ED shows trace leukocyte Estrace  -Currently denies urinary symptoms  -- WBCs 13.3; lactic acid 1.9  -monitor for worsening symptoms    Diabetes Mellitus  -hold oral hypoglycemics/metformin for now  -POCT ac and hs with sliding scale coverage      Consultations:     Zia Mccoy TO GI      BENNETT Jimenez - CNP   6/23/2022  3:10 AM    Rachel Fernandez [address] [place] [address], [address] [place] [address]. Phone [phone]     Attending Physician Statement  I have discussed the care of Paddy Henry with the CNP. I have examined the patient myself and taken ros and hpi , including pertinent history and exam findings. I have reviewed the key elements of all parts of the encounter with the CNPt. I agree with the assessment, plan and orders as documented by the CNP. 72-year-old presenting with abdominal pain nausea vomiting for 1 week  History of COPD, CAD, type II DM, hypertension, morbid obesity, SAGAR  1. Metastatic liver disease-new diagnosis-CEA ordered, GI oncology consulted  2. Severe hypokalemia without EKG changes-2.7 on arrival-replacement protocol. EKG shows left BBB  3. CAD- trop negative X2  4. Infrarenal AAA 3.1 cm- will need outpatient follow-up  5. Type 2 diabetes- sliding scale  6. COPD currently compensated  7. Hypertension-resume home medication  Body mass index is 36.34 kg/m².     Detail discussion with patient-CODE STATUS discussed-she is unable to decide right now as such will remain full code for now  Patient is in a lot of emotional distress-palliative care consulted  Patient continues to refuse MRI in spite of detailed discussion  DVT prophylaxis- Lovenox    Electronically signed by Allan Shin MD

## 2022-06-23 NOTE — CONSULTS
_                         Today's Date: 6/23/2022  Patient Name: Byron Shahid  Date of admission: 6/22/2022  2:48 PM  Patient's age: 81 y.o., 1950  Admission Dx: Hypokalemia [E87.6]      Requesting Physician: Gem Hutchins MD    CHIEF COMPLAINT: Abdominal pain. Weight loss and decreased appetite. Consult for gastric cancer with liver metastasis. History Obtained From:  patient, electronic medical record    HISTORY OF PRESENT ILLNESS:      The patient is a 81 y.o.  female who is admitted to the hospital for further management of abdominal pain and evidence of liver metastasis. Patient had abdominal discomfort mainly in the upper abdomen for the last 2 months. She had no nausea or vomiting. No GI bleeding. She had decreased appetite and she is losing weight. He had no hematuria or urinary symptoms. No vaginal bleeding or spotting. She had no melena or hematochezia. No hematemesis. Patient never had any colonoscopy. She was evaluated with CT scan of the abdomen pelvis which showed evidence of liver metastasis. Gallbladder was not identified. The CT scan was read as follows   Impression   1.  Findings compatible with metastatic disease in the liver.  The   gallbladder is not discretely visualized separate from this locally   aggressive process, which may be directly involved or the primary source of   disease.  Direct extension from the liver appears to involve the adjacent   stomach and duodenum.       2.  Findings compatible with peritoneal carcinomatosis with discrete   peritoneal implants.  Trace abdominopelvic ascites.       Patient had hospitalization and had further blood work-up with tumor markers which revealed extremely high tumor marker CA 19-9 with moderate increase of alpha-fetoprotein and CEA.         Past Medical History:   has a past medical history of Acid reflux, Arthritis, Cataracts, bilateral, [facility]est[facility] COPD (chronic obstructive pulmonary disease) ([facility] Gall stones, Head injury, Hyperlipidemia, Hypertension, Insomnia, MI (myocardial infarction) ([facility] Neuropathy, Sleep apnea, and Type 2 diabetes mellitus without complication ([facility] Past Surgical History:   has a past surgical history that includes Femur Surgery; Foot surgery (Left); Mandible fracture surgery; Cardiac surgery (1999); Ankle surgery (Left); Tonsillectomy; and Coronary angioplasty with stent (1999). Family History: family history includes Other in her father; Stroke in her mother. Social History:   reports that she quit smoking about 22 years ago. Her smoking use included cigarettes. She has a 40.00 pack-year smoking history. She has never used smokeless tobacco. She reports current alcohol use of about 1.0 standard drink of alcohol per week. She reports current drug use. Drug: Marijuana [place]). Medications:    Prior to Admission medications    Medication Sig Start Date End Date Taking? Authorizing Provider   gabapentin (NEURONTIN) 600 MG tablet Take 600 mg by mouth 3 times daily.    Yes Historical Provider, MD   albuterol sulfate  (90 Base) MCG/ACT inhaler Inhale 2 puffs into the lungs every 6 hours as needed for Wheezing or Shortness of Breath 5/26/21   BENNETT Finney CNP   ranolazine (RANEXA) 500 MG extended release tablet Take 1 tablet by mouth 2 times daily 3/12/21   BENNETT Finney CNP   lisinopril (PRINIVIL;ZESTRIL) 10 MG tablet take 1 tablet by mouth once daily 3/12/21   BENNETT Finney CNP   venlafaxine (EFFEXOR XR) 150 MG extended release capsule Take 1 capsule by mouth daily Take with 75 mg capsule 2/26/21   BENNETT Finney CNP   ARIPiprazole (ABILIFY) 5 MG tablet Take 1 tablet by mouth daily 2/26/21   BENNETT Finney CNP   budesonide-formoterol [facility]) 160-4.5 MCG/ACT AERO Inhale 2 puffs into the lungs 2 times daily 2/26/21   BENNETT Finney CNP   tiotropium (SPIRIVA RESPIMAT) 2.5 MCG/ACT AERS inhaler Inhale 2 puffs into the lungs daily 2/26/21   BENNETT Hankins CNP   traZODone (DESYREL) 50 MG tablet take 1 tablet by mouth at bedtime if needed for sleep 12/30/20   Historical Provider, MD   oxybutynin (DITROPAN) 5 MG tablet take 1 tablet by mouth twice a day 2/26/21   BENNETT Hankins CNP   glucose monitoring kit (FREESTYLE) monitoring kit 1 kit by Does not apply route daily 10/3/20   BENNETT Hankins CNP   blood glucose monitor strips Test 3 times a day & as needed for symptoms of irregular blood glucose. Dispense sufficient amount for indicated testing frequency plus additional to accommodate PRN testing needs.  10/3/20   BENNETT Hankins CNP   Lancets MISC 1 each by Does not apply route 3 times daily 10/3/20   BENNETT Hankins CNP   atorvastatin (LIPITOR) 80 MG tablet Take 1 tablet by mouth daily 9/30/20   BENNETT Hankins CNP   bisoprolol (ZEBETA) 5 MG tablet take 1 tablet by mouth once daily 9/30/20   BENNETT Hankins CNP   omeprazole (PRILOSEC) 40 MG delayed release capsule take 1 capsule by mouth every morning BEFORE BREAKFAST 9/30/20   BENNETT Hankins CNP   clopidogrel (PLAVIX) 75 MG tablet take 1 tablet by mouth once daily 9/30/20   BENNETT Hankins CNP   venlafaxine (EFFEXOR XR) 75 MG extended release capsule Take 1 capsule by mouth daily 9/30/20   BENNETT Hankins CNP   glimepiride (AMARYL) 2 MG tablet take 1 tablet by mouth twice a day before meals 9/30/20   BENNETT Hankins CNP   Semaglutide, 1 MG/DOSE, (OZEMPIC, 1 MG/DOSE,) 2 MG/1.5ML SOPN Inject 1 mg per week 9/30/20   BENNETT Hankins CNP   Multiple Vitamins-Minerals (THERAPEUTIC MULTIVITAMIN-MINERALS) tablet Take 1 tablet by mouth daily 9/30/20   BENNETT Hankins CNP   Melatonin 5 MG SUBL Place 1 applicator under the tongue nightly as needed (insomnia) 9/30/20   Meli Monaco, APRN - CNP   Insulin Pen Needle 32G X 4 MM MISC 1 each by Does not apply route once a week 3/12/20   Lexii Lowe MD   fluticasone [facility]) 50 MCG/ACT nasal spray 2 sprays by Nasal route daily 9/13/19   Lexii Lowe MD   aspirin 81 MG tablet Take 81 mg by mouth daily    Historical Provider, MD     Current Facility-Administered Medications   Medication Dose Route Frequency Provider Last Rate Last Admin    morphine (PF) injection 2 mg  2 mg IntraVENous Q4H PRN Pepe Slot, APRN - CNP        Or    morphine sulfate (PF) injection 4 mg  4 mg IntraVENous Q4H PRN Pepe Slot, APRN - CNP   4 mg at 06/23/22 1638    albuterol sulfate HFA (PROVENTIL;VENTOLIN;PROAIR) 108 (90 Base) MCG/ACT inhaler 2 puff  2 puff Inhalation Q6H PRN Pepe Slot, APRN - CNP        tiotropium (SPIRIVA RESPIMAT) 2.5 MCG/ACT inhaler 2 puff  2 puff Inhalation Daily Pepe Slot, APRN - CNP   2 puff at 06/23/22 0811    budesonide-formoterol (SYMBICORT) 160-4.5 MCG/ACT inhaler 2 puff  2 puff Inhalation BID Pepe Slot, APRN - CNP   2 puff at 06/23/22 0811    fluticasone (FLONASE) 50 MCG/ACT nasal spray 2 spray  2 spray Nasal Daily Pepe Slot, APRN - CNP        glucose chewable tablet 16 g  4 tablet Oral PRN Pepe Slot, APRN - CNP        dextrose bolus 10% 125 mL  125 mL IntraVENous PRN Pepe Slot, APRN - CNP        Or    dextrose bolus 10% 250 mL  250 mL IntraVENous PRN Pepe Slot, APRN - CNP        glucagon (rDNA) injection 1 mg  1 mg IntraMUSCular PRN Pepe Slot, APRN - CNP        dextrose 5 % solution  100 mL/hr IntraVENous PRN Pepe Slot, APRN - CNP        insulin lispro (HUMALOG) injection vial 0-12 Units  0-12 Units SubCUTAneous TID WC Pepe Slot, APRN - CNP   2 Units at 06/23/22 1738    insulin lispro (HUMALOG) injection vial 0-6 Units  0-6 Units SubCUTAneous Nightly Pepe Slot, APRN - CNP        potassium chloride 10 mEq/100 mL IVPB (Peripheral Line)  10 mEq IntraVENous PRN Pepe Slot, APRN - CNP        ARIPiprazole (ABILIFY) tablet 5 mg  5 mg Oral Daily Trinymatthew KimAUGUSTINA thorntonN - CNP   5 mg at 06/23/22 1403    aspirin EC tablet 81 mg  81 mg Oral Daily Triny CurielBENNETT thornton - CNP        atorvastatin (LIPITOR) tablet 80 mg  80 mg Oral Daily Triny Curiel, APRN - JOEL        metoprolol succinate (TOPROL XL) extended release tablet 50 mg  50 mg Oral Daily Trinymatthew Kimer, APRN - JOEL        clopidogrel (PLAVIX) tablet 75 mg  75 mg Oral Daily Trinymatthew KimBENNETT thornton - CNP        gabapentin (NEURONTIN) tablet 600 mg  600 mg Oral TID Triny Curiel APRN - CNP   600 mg at 06/23/22 1354    lisinopril (PRINIVIL;ZESTRIL) tablet 10 mg  10 mg Oral Daily Trinymatthew KimBENNETT thornton - JOEL        pantoprazole (PROTONIX) tablet 40 mg  40 mg Oral QAM AC BENNETT Fishman - JOEL        oxybutynin (DITROPAN) tablet 5 mg  5 mg Oral BID Trinymatthew KimBENNETT thornton - CNP        ranolazine [facility]) extended release tablet 500 mg  500 mg Oral BID Trinymatthew KimBENNETT thornton - CNP        traZODone (DESYREL) tablet 50 mg  50 mg Oral Nightly PRN BENNETT Fishman - CNP        venlafaxine (EFFEXOR XR) extended release capsule 150 mg  150 mg Oral Daily Triny CurielBENNETT thornton - CNP   150 mg at 06/23/22 1404    venlafaxine (EFFEXOR XR) extended release capsule 75 mg  75 mg Oral Daily Triny CurielBENNETT thornton - CNP   75 mg at 06/23/22 1405    sodium chloride flush 0.9 % injection 10 mL  10 mL IntraVENous PRN Elif Souza DO   10 mL at 06/22/22 1755    sodium chloride flush 0.9 % injection 5-40 mL  5-40 mL IntraVENous 2 times per day BENNETT Fishman CNP        sodium chloride flush 0.9 % injection 10 mL  10 mL IntraVENous PRN BENNETT Fishman CNP        0.9 % sodium chloride infusion   IntraVENous PRN BENNETT Fishman CNP        enoxaparin (LOVENOX) injection 40 mg  40 mg SubCUTAneous Daily BENNETT Fishman CNP        magnesium hydroxide (MILK OF MAGNESIA) 400 MG/5ML suspension 30 mL  30 mL Oral Daily PRN BENNETT Fishman - CNP        acetaminophen (TYLENOL) tablet 650 mg  650 mg Oral Q6H PRN BENNETT Fishman - CNP        Or    acetaminophen (TYLENOL) suppository 650 mg  650 mg Rectal Q6H PRN Kennedi Rosamaria, APRN - CNP        0.9 % sodium chloride infusion   IntraVENous Continuous [place] Rosamaria, APRN - CNP 75 mL/hr at 22 New Bag at 22       Allergies:  Chantix [varenicline tartrate] and Sulphadimidine [sulfamethazine]    REVIEW OF SYSTEMS:      · General: Positive for weakness and fatigue. Positive for weight loss and decreased appetite. . No fever or chills. · Eyes: No blurred vision, eye pain or double vision. · Ears: No hearing problems or drainage. No tinnitus. · Throat: No sore throat, problems with swallowing or dysphagia. · Respiratory: No cough, sputum or hemoptysis. No shortness of breath. No pleuritic chest pain. · Cardiovascular: No chest pain, orthopnea or PND. No lower extremity edema. No palpitation. · Gastrointestinal: As above. .   · Genitourinary: No dysuria, hematuria, frequency or urgency. · Musculoskeletal: No muscle aches or pains. No limitation of movement. No back pain. No gait disturbance, No joint complaints. · Dermatologic: No skin rashes or pruritus. No skin lesions or discolorations. · Psychiatric: No depression, anxiety, or stress or signs of schizophrenia. No change in mood or affect. · Hematologic: No history of bleeding tendency. No bruises or ecchymosis. No history of clotting problems. · Infectious disease: No fever, chills or frequent infections. · Endocrine: No polydipsia or polyuria. No temperature intolerance. · Neurologic: No headaches or dizziness. No weakness or numbness of the extremities. No changes in balance, coordination,  memory, mentation, behavior. · Allergic/Immunologic: No nasal congestion or hives. No repeated infections.        PHYSICAL EXAM:      BP (!) 109/53   Pulse 68   Temp 98.6 °F (37 °C) (Oral)   Resp 18   Ht 5' (1.524 m)   Wt 186 lb 1.1 oz (84.4 kg)   SpO2 92%   BMI 36.34 kg/m²    Temp (24hrs), Av.1 °F (36.7 °C), Min:97.6 °F (36.4 °C), Max:98.6 °F (37 °C)      General appearance - not in pain or distress  Mental status - alert and oriented  Eyes - pupils equal and reactive, extraocular eye movements intact  Ears - bilateral TM's and external ear canals normal  Nose - normal and patent, no erythema, discharge or polyps  Mouth - mucous membranes moist, pharynx normal without lesions  Neck - supple, no significant adenopathy  Lymphatics - no palpable lymphadenopathy, no hepatosplenomegaly  Chest - clear to auscultation, no wheezes, rales or rhonchi, symmetric air entry  Heart - normal rate, regular rhythm, normal S1, S2, no murmurs, rubs, clicks or gallops  Abdomen - soft, mild right upper quadrant abdominal tenderness, nondistended, no masses or organomegaly  Neurological - alert, oriented, normal speech, no focal findings or movement disorder noted  Musculoskeletal - no joint tenderness, deformity or swelling  Extremities - peripheral pulses normal, no pedal edema, no clubbing or cyanosis  Skin - normal coloration and turgor, no rashes, no suspicious skin lesions noted           DATA:      Labs:       CBC:   Recent Labs     06/22/22  1545 06/23/22  0556   WBC 13.3* 9.1   HGB 12.7 11.2*   HCT 39.0 34.3*   * 461*     BMP:   Recent Labs     06/22/22  1545 06/22/22  1545 06/22/22  2138 06/23/22  0556     --   --  137   K 2.7*   < > 3.2* 3.4*   CO2 29  --   --  30   BUN 8  --   --  7*   CREATININE 0.69  --   --  0.64   LABGLOM >60  --   --  >60   GLUCOSE 125*  --   --  105*    < > = values in this interval not displayed. PT/INR: No results for input(s): PROTIME, INR in the last 72 hours. APTT:No results for input(s): APTT in the last 72 hours.   LIVER PROFILE:  Recent Labs     06/22/22  1545   AST 26   ALT 9   LABALBU 3.1*     CT ABDOMEN PELVIS W IV CONTRAST Additional Contrast? None  Narrative: EXAMINATION:  CT OF THE ABDOMEN AND PELVIS WITH CONTRAST 6/22/2022 5:49 pm    TECHNIQUE:  CT of the abdomen and pelvis was performed with the administration of  intravenous contrast. Multiplanar reformatted images are provided for review. Automated exposure control, iterative reconstruction, and/or weight based  adjustment of the mA/kV was utilized to reduce the radiation dose to as low  as reasonably achievable. COMPARISON:  None. HISTORY:  ORDERING SYSTEM PROVIDED HISTORY: abd pain  TECHNOLOGIST PROVIDED HISTORY:  abd pain    Decision Support Exception - unselect if not a suspected or confirmed  emergency medical condition->Emergency Medical Condition (MA)  Reason for Exam: abd pain  Additional signs and symptoms: emisis x1 wk, e coli    FINDINGS:  Lower Chest: Small right pleural effusion. Organs: Multiple suspicious liver lesions are present throughout the liver  with confluent mass and capsular retraction measuring up to 5.5 cm involving  segment 4/5. The gallbladder is not discretely visualized and may be  directly involved. There also appears to be soft tissue extension abutting  the stomach and duodenum on axial image 63. Biliary dilatation is present with choledocholithiasis noted in the distal  common duct. The pancreas, spleen, adrenals and kidneys reveal no acute findings. GI/Bowel: There is no bowel dilatation or wall thickening identified. Pelvis: No acute findings. Peritoneum/Retroperitoneum: Soft tissue nodularity in the right abdomen is  noted, compatible with peritoneal implants. Trace ascites. No free air or  loculated fluid collection. Infrarenal aortic aneurysm measures up to 3.1 cm. Bones/Soft Tissues: No abnormality identified. Benign-appearing rim  calcification in the right breast.  Impression: 1. Findings compatible with metastatic disease in the liver. The  gallbladder is not discretely visualized separate from this locally  aggressive process, which may be directly involved or the primary source of  disease.   Direct extension from the liver appears to involve the adjacent  stomach and duodenum. 2.  Findings compatible with peritoneal carcinomatosis with discrete  peritoneal implants. Trace abdominopelvic ascites. 3.  Choledocholithiasis with biliary dilatation. 4.  Infrarenal aortic aneurysm measuring up to 3.1 cm. Please see  recommendation below. RECOMMENDATIONS:  3.1 cm infrarenal abdominal aortic aneurysm. Recommend follow-up every 3  years. Reference: J Am Vish Radiol 4995;14:552-119. IMPRESSION:    Primary Problem  Hypokalemia    Active Hospital Problems    Diagnosis Date Noted    Choledocholithiasis [K80.50]      Priority: Medium    Liver mass [R16.0]      Priority: Medium    Anemia [D64.9]      Priority: Medium    Weight loss [R63.4]      Priority: Medium    Generalized abdominal pain [R10.84]      Priority: Medium    Nausea and vomiting [R11.2]      Priority: Medium    Thrombocytosis [D75.839]      Priority: Medium    Hypokalemia [E87.6] 06/22/2022     Priority: Medium    Diabetes mellitus type 2 in obese ([facility] [E11.69, E66.9] 02/18/2019    Morbid obesity ([address] [E66.01] 02/18/2019       RECOMMENDATIONS:  1. Records and labs and images were reviewed and discussed the patient. 2. Images are consistent with intra-abdominal malignancy with probable liver metastasis. MRI was ordered but patient declined. Tumor markers showed very high CA 19-9 and elevated CEA and alpha-fetoprotein. 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 3. Explained all of the above to the patient. Still she is restricting MRI. 4. We will refer to IR for CT-guided needle biopsy. Further recommendations will be after establishing the diagnosis. 5. Patient's questions were answered to the best of her satisfaction and she verbalized full understanding and agreement. 6. Thank you for allowing us to participate in the care of this pleasant patient. Discussed with patient and Nurse.     Hilario Mckee MD, MD                            [address] [facility]                            This note is created with the assistance of a speech recognition program.  While intending to generate a document that actually reflects the content of the visit, the document can still have some errors including those of syntax and sound a like substitutions which may escape proof reading. It such instances, actual meaning can be extrapolated by contextual diversion.

## 2022-06-24 ENCOUNTER — TELEPHONE (OUTPATIENT)
Dept: ONCOLOGY | Age: 72
End: 2022-06-24

## 2022-06-24 VITALS
TEMPERATURE: 98.2 F | SYSTOLIC BLOOD PRESSURE: 90 MMHG | BODY MASS INDEX: 36.53 KG/M2 | HEIGHT: 60 IN | HEART RATE: 78 BPM | OXYGEN SATURATION: 90 % | WEIGHT: 186.07 LBS | RESPIRATION RATE: 16 BRPM | DIASTOLIC BLOOD PRESSURE: 62 MMHG

## 2022-06-24 LAB
ANION GAP SERPL CALCULATED.3IONS-SCNC: 10 MMOL/L (ref 9–17)
BUN BLDV-MCNC: 5 MG/DL (ref 8–23)
CALCIUM SERPL-MCNC: 8.2 MG/DL (ref 8.6–10.4)
CHLORIDE BLD-SCNC: 96 MMOL/L (ref 98–107)
CO2: 29 MMOL/L (ref 20–31)
CREAT SERPL-MCNC: 0.55 MG/DL (ref 0.5–0.9)
EKG ATRIAL RATE: 65 BPM
EKG P AXIS: 75 DEGREES
EKG P-R INTERVAL: 140 MS
EKG Q-T INTERVAL: 498 MS
EKG QRS DURATION: 112 MS
EKG QTC CALCULATION (BAZETT): 517 MS
EKG R AXIS: 62 DEGREES
EKG T AXIS: 132 DEGREES
EKG VENTRICULAR RATE: 65 BPM
GFR AFRICAN AMERICAN: >60 ML/MIN
GFR NON-AFRICAN AMERICAN: >60 ML/MIN
GFR SERPL CREATININE-BSD FRML MDRD: ABNORMAL ML/MIN/{1.73_M2}
GLUCOSE BLD-MCNC: 119 MG/DL (ref 65–105)
GLUCOSE BLD-MCNC: 133 MG/DL (ref 65–105)
GLUCOSE BLD-MCNC: 134 MG/DL (ref 65–105)
GLUCOSE BLD-MCNC: 142 MG/DL (ref 70–99)
HAV IGM SER IA-ACNC: NONREACTIVE
HCT VFR BLD CALC: 35.8 % (ref 36–46)
HEMOGLOBIN: 11.7 G/DL (ref 12–16)
HEPATITIS B CORE IGM ANTIBODY: NONREACTIVE
HEPATITIS B SURFACE ANTIGEN: NONREACTIVE
HEPATITIS C ANTIBODY: NONREACTIVE
INR BLD: 1.2
MCH RBC QN AUTO: 29.5 PG (ref 26–34)
MCHC RBC AUTO-ENTMCNC: 32.8 G/DL (ref 31–37)
MCV RBC AUTO: 89.8 FL (ref 80–100)
PARTIAL THROMBOPLASTIN TIME: 30.9 SEC (ref 24–36)
PDW BLD-RTO: 13.7 % (ref 11.5–14.9)
PLATELET # BLD: 471 K/UL (ref 150–450)
PMV BLD AUTO: 7.7 FL (ref 6–12)
POTASSIUM SERPL-SCNC: 4.1 MMOL/L (ref 3.7–5.3)
PROTHROMBIN TIME: 15.4 SEC (ref 11.8–14.6)
RBC # BLD: 3.98 M/UL (ref 4–5.2)
SODIUM BLD-SCNC: 135 MMOL/L (ref 135–144)
WBC # BLD: 12.8 K/UL (ref 3.5–11)

## 2022-06-24 PROCEDURE — 99239 HOSP IP/OBS DSCHRG MGMT >30: CPT | Performed by: INTERNAL MEDICINE

## 2022-06-24 PROCEDURE — 36415 COLL VENOUS BLD VENIPUNCTURE: CPT

## 2022-06-24 PROCEDURE — 94640 AIRWAY INHALATION TREATMENT: CPT

## 2022-06-24 PROCEDURE — 99232 SBSQ HOSP IP/OBS MODERATE 35: CPT | Performed by: INTERNAL MEDICINE

## 2022-06-24 PROCEDURE — 82947 ASSAY GLUCOSE BLOOD QUANT: CPT

## 2022-06-24 PROCEDURE — 6370000000 HC RX 637 (ALT 250 FOR IP): Performed by: INTERNAL MEDICINE

## 2022-06-24 PROCEDURE — 85730 THROMBOPLASTIN TIME PARTIAL: CPT

## 2022-06-24 PROCEDURE — 94760 N-INVAS EAR/PLS OXIMETRY 1: CPT

## 2022-06-24 PROCEDURE — 80048 BASIC METABOLIC PNL TOTAL CA: CPT

## 2022-06-24 PROCEDURE — 93010 ELECTROCARDIOGRAM REPORT: CPT | Performed by: INTERNAL MEDICINE

## 2022-06-24 PROCEDURE — 6360000002 HC RX W HCPCS: Performed by: NURSE PRACTITIONER

## 2022-06-24 PROCEDURE — APPSS30 APP SPLIT SHARED TIME 16-30 MINUTES: Performed by: NURSE PRACTITIONER

## 2022-06-24 PROCEDURE — 85610 PROTHROMBIN TIME: CPT

## 2022-06-24 PROCEDURE — 85027 COMPLETE CBC AUTOMATED: CPT

## 2022-06-24 PROCEDURE — 6370000000 HC RX 637 (ALT 250 FOR IP): Performed by: NURSE PRACTITIONER

## 2022-06-24 RX ORDER — BLOOD-GLUCOSE METER
1 KIT MISCELLANEOUS DAILY
Qty: 1 KIT | Refills: 0 | Status: SHIPPED | OUTPATIENT
Start: 2022-06-24

## 2022-06-24 RX ORDER — BLOOD SUGAR DIAGNOSTIC
1 STRIP MISCELLANEOUS DAILY
Qty: 100 EACH | Refills: 3 | Status: SHIPPED | OUTPATIENT
Start: 2022-06-24

## 2022-06-24 RX ORDER — LANCETS 28 GAUGE
1 EACH MISCELLANEOUS DAILY
Qty: 100 EACH | Refills: 3 | Status: SHIPPED | OUTPATIENT
Start: 2022-06-24

## 2022-06-24 RX ORDER — BLOOD-GLUCOSE METER
1 KIT MISCELLANEOUS DAILY PRN
Qty: 1 KIT | Refills: 0 | Status: SHIPPED | OUTPATIENT
Start: 2022-06-24

## 2022-06-24 RX ORDER — OXYCODONE HYDROCHLORIDE 5 MG/1
5 TABLET ORAL ONCE
Status: COMPLETED | OUTPATIENT
Start: 2022-06-24 | End: 2022-06-24

## 2022-06-24 RX ORDER — BLOOD-GLUCOSE METER
1 KIT MISCELLANEOUS DAILY PRN
Qty: 1 EACH | Refills: 0 | Status: SHIPPED | OUTPATIENT
Start: 2022-06-24

## 2022-06-24 RX ORDER — OXYCODONE HYDROCHLORIDE 5 MG/1
5 TABLET ORAL EVERY 4 HOURS PRN
Status: DISCONTINUED | OUTPATIENT
Start: 2022-06-24 | End: 2022-06-25 | Stop reason: HOSPADM

## 2022-06-24 RX ADMIN — VENLAFAXINE HYDROCHLORIDE 150 MG: 150 CAPSULE, EXTENDED RELEASE ORAL at 09:43

## 2022-06-24 RX ADMIN — INSULIN LISPRO 1 UNITS: 100 INJECTION, SOLUTION INTRAVENOUS; SUBCUTANEOUS at 09:45

## 2022-06-24 RX ADMIN — ARIPIPRAZOLE 5 MG: 5 TABLET ORAL at 10:07

## 2022-06-24 RX ADMIN — MORPHINE SULFATE 4 MG: 4 INJECTION, SOLUTION INTRAMUSCULAR; INTRAVENOUS at 10:14

## 2022-06-24 RX ADMIN — OXYBUTYNIN CHLORIDE 5 MG: 5 TABLET ORAL at 09:44

## 2022-06-24 RX ADMIN — ATORVASTATIN CALCIUM 80 MG: 80 TABLET, FILM COATED ORAL at 09:41

## 2022-06-24 RX ADMIN — PANTOPRAZOLE SODIUM 40 MG: 40 TABLET, DELAYED RELEASE ORAL at 06:13

## 2022-06-24 RX ADMIN — RANOLAZINE 500 MG: 500 TABLET, EXTENDED RELEASE ORAL at 09:43

## 2022-06-24 RX ADMIN — GABAPENTIN 600 MG: 600 TABLET, FILM COATED ORAL at 14:50

## 2022-06-24 RX ADMIN — MORPHINE SULFATE 4 MG: 4 INJECTION, SOLUTION INTRAMUSCULAR; INTRAVENOUS at 06:13

## 2022-06-24 RX ADMIN — BUDESONIDE AND FORMOTEROL FUMARATE DIHYDRATE 2 PUFF: 160; 4.5 AEROSOL RESPIRATORY (INHALATION) at 07:31

## 2022-06-24 RX ADMIN — MORPHINE SULFATE 4 MG: 4 INJECTION, SOLUTION INTRAMUSCULAR; INTRAVENOUS at 00:41

## 2022-06-24 RX ADMIN — METOPROLOL SUCCINATE 50 MG: 50 TABLET, EXTENDED RELEASE ORAL at 09:39

## 2022-06-24 RX ADMIN — ENOXAPARIN SODIUM 40 MG: 100 INJECTION SUBCUTANEOUS at 09:45

## 2022-06-24 RX ADMIN — GABAPENTIN 600 MG: 600 TABLET, FILM COATED ORAL at 09:39

## 2022-06-24 RX ADMIN — VENLAFAXINE HYDROCHLORIDE 75 MG: 75 CAPSULE, EXTENDED RELEASE ORAL at 09:39

## 2022-06-24 RX ADMIN — LISINOPRIL 10 MG: 10 TABLET ORAL at 09:42

## 2022-06-24 RX ADMIN — OXYCODONE HYDROCHLORIDE 5 MG: 5 TABLET ORAL at 17:32

## 2022-06-24 RX ADMIN — TIOTROPIUM BROMIDE INHALATION SPRAY 2 PUFF: 3.12 SPRAY, METERED RESPIRATORY (INHALATION) at 07:30

## 2022-06-24 ASSESSMENT — PAIN DESCRIPTION - LOCATION
LOCATION: ABDOMEN

## 2022-06-24 ASSESSMENT — PAIN DESCRIPTION - DESCRIPTORS: DESCRIPTORS: ACHING

## 2022-06-24 ASSESSMENT — PAIN DESCRIPTION - FREQUENCY: FREQUENCY: CONTINUOUS

## 2022-06-24 ASSESSMENT — PAIN DESCRIPTION - ONSET: ONSET: ON-GOING

## 2022-06-24 ASSESSMENT — PAIN SCALES - GENERAL
PAINLEVEL_OUTOF10: 10
PAINLEVEL_OUTOF10: 8
PAINLEVEL_OUTOF10: 7
PAINLEVEL_OUTOF10: 7
PAINLEVEL_OUTOF10: 6
PAINLEVEL_OUTOF10: 5
PAINLEVEL_OUTOF10: 5

## 2022-06-24 ASSESSMENT — PAIN DESCRIPTION - ORIENTATION: ORIENTATION: LOWER

## 2022-06-24 ASSESSMENT — PAIN DESCRIPTION - PAIN TYPE: TYPE: ACUTE PAIN

## 2022-06-24 ASSESSMENT — PAIN - FUNCTIONAL ASSESSMENT: PAIN_FUNCTIONAL_ASSESSMENT: ACTIVITIES ARE NOT PREVENTED

## 2022-06-24 NOTE — PROGRESS NOTES
[facility]    Gastroenterology Daily Progress Note      Patient: Norma Osullivan   :    1950   Facility:   [facility]  Date:     2022  Consultant:   BENNETT Sargent CNP, CNP      SUBJECTIVE  81 y.o. female admitted 2022 with Hypokalemia [E87.6] and seen for liver masses and abdominal pain. The pt was seen and examined. Continues to have generalized abdominal pain, no nausea or emesis. Tumor markers are very elevated. . Liver bx ordered but the [facility] needs to be held for 5 days.  .        OBJECTIVE  Scheduled Meds:   tiotropium  2 puff Inhalation Daily    budesonide-formoterol  2 puff Inhalation BID    fluticasone  2 spray Nasal Daily    insulin lispro  0-12 Units SubCUTAneous TID WC    insulin lispro  0-6 Units SubCUTAneous Nightly    ARIPiprazole  5 mg Oral Daily    [Held by provider] aspirin  81 mg Oral Daily    atorvastatin  80 mg Oral Daily    metoprolol succinate  50 mg Oral Daily    [Held by provider] clopidogrel  75 mg Oral Daily    gabapentin  600 mg Oral TID    lisinopril  10 mg Oral Daily    pantoprazole  40 mg Oral QAM AC    oxybutynin  5 mg Oral BID    ranolazine  500 mg Oral BID    venlafaxine  150 mg Oral Daily    venlafaxine  75 mg Oral Daily    sodium chloride flush  5-40 mL IntraVENous 2 times per day    enoxaparin  40 mg SubCUTAneous Daily       Vital Signs:  /63   Pulse 88   Temp 97.8 °F (36.6 °C) (Oral)   Resp 16   Ht 5' (1.524 m)   Wt 186 lb 1.1 oz (84.4 kg)   SpO2 91%   BMI 36.34 kg/m²      Physical Exam:   General Appearance: alert and oriented to person, place and time, well-developed and well-nourished, in no acute distress  Skin: warm and dry, no rash or erythema  Head: normocephalic and atraumatic  Eyes: pupils equal, round, and reactive to light, extraocular eye movements intact, conjunctivae normal  ENT: hearing grossly normal bilaterally  Neck: neck supple and non tender without mass, no thyromegaly or thyroid nodules, no cervical lymphadenopathy   Pulmonary/Chest: clear to auscultation bilaterally- no wheezes, rales or rhonchi, normal air movement, no respiratory distress  Cardiovascular: normal rate, regular rhythm, normal S1 and S2, no murmurs, rubs, clicks or gallops, distal pulses intact, no carotid bruits  Abdomen: soft, obese generalized tenderness, non-distended, normal bowel sounds, no masses or organomegaly  Extremities: no cyanosis, clubbing or edema  Musculoskeletal: normal range of motion, no joint swelling, deformity or tenderness  Neurologic: no cranial nerve deficit and muscle strength normal    Lab and Imaging Review     CBC  Recent Labs     06/22/22  1545 06/23/22  0556 06/24/22  0543   WBC 13.3* 9.1 12.8*   HGB 12.7 11.2* 11.7*   HCT 39.0 34.3* 35.8*   MCV 88.2 89.6 89.8   * 461* 471*       BMP  Recent Labs     06/22/22  1545 06/22/22  1545 06/22/22  2138 06/23/22  0556 06/24/22  0543     --   --  137 135   K 2.7*   < > 3.2* 3.4* 4.1   CL 91*  --   --  97* 96*   CO2 29  --   --  30 29   BUN 8  --   --  7* 5*   CREATININE 0.69  --   --  0.64 0.55   GLUCOSE 125*  --   --  105* 142*   CALCIUM 8.9  --   --  8.0* 8.2*    < > = values in this interval not displayed. LFTS  Recent Labs     06/22/22  1545   ALKPHOS 96   ALT 9   AST 26   PROT 6.7   BILITOT 0.59   BILIDIR 0.22   LABALBU 3.1*       AMYLASE/LIPASE/AMMONIA  Recent Labs     06/22/22  1545   LIPASE 24       PT/INR  Recent Labs     06/24/22  0739   PROTIME 15.4*   INR 1.2     Results for Wilman Mathis" (MRN [id number]) as of 6/24/2022 10:59   Ref.  Range 6/23/2022 08:55   Hep A IgM Latest Ref Range: NONREACTIVE  NONREACTIVE   Hepatitis B Surface Ag Latest Ref Range: NONREACTIVE  NONREACTIVE   Hepatitis C Ab Unknown PENDING   Hep B Core Ab, IgM Latest Ref Range: NONREACTIVE  NONREACTIVE       ANEMIA STUDIES  Recent Labs     06/23/22  0855   LABIRON 13*   TIBC 182*   FERRITIN 627*   YOTTHRPL99 >2000*   FOLATE 12.7 Results for [place]ana Hudson" (MRN [id number]) as of 6/24/2022 10:59   Ref. Range 6/23/2022 08:55   CA 19-9 Latest Ref Range: 0 - 35 U/mL 152463 (H)   CEA Latest Ref Range: <3.9 ng/mL 296.6 (H)   Results for [place]ana Hudson" (MRN [id number]) as of 6/24/2022 10:59   Ref. Range 6/23/2022 08:55   AFP (Alpha Fetoprotein) Latest Ref Range: <8.4 ug/L 182.0 (H)     FINDINGS:ct abd 6/22/22   Lower Chest: Small right pleural effusion.       Organs: Multiple suspicious liver lesions are present throughout the liver   with confluent mass and capsular retraction measuring up to 5.5 cm involving   segment 4/5.  The gallbladder is not discretely visualized and may be   directly involved.  There also appears to be soft tissue extension abutting   the stomach and duodenum on axial image 63.       Biliary dilatation is present with choledocholithiasis noted in the distal   common duct.       The pancreas, spleen, adrenals and kidneys reveal no acute findings.       GI/Bowel:  There is no bowel dilatation or wall thickening identified.       Pelvis: No acute findings.       Peritoneum/Retroperitoneum: Soft tissue nodularity in the right abdomen is   noted, compatible with peritoneal implants.  Trace ascites.  No free air or   loculated fluid collection.       Infrarenal aortic aneurysm measures up to 3.1 cm.       Bones/Soft Tissues: No abnormality identified.  Benign-appearing rim   calcification in the right breast.           Impression   1.  Findings compatible with metastatic disease in the liver.  The   gallbladder is not discretely visualized separate from this locally   aggressive process, which may be directly involved or the primary source of   disease.  Direct extension from the liver appears to involve the adjacent   stomach and duodenum.       2.  Findings compatible with peritoneal carcinomatosis with discrete   peritoneal implants.  Trace abdominopelvic ascites.       3.  Choledocholithiasis with biliary dilatation.       4.  Infrarenal aortic aneurysm measuring up to 3.1 cm.  Please see   recommendation below.             ASSESSMENT/plan  1. Abdominal pain with elevated tumor markers, likely  intra abdominal malignancy  -refusing mri, no need for ercp at this time, lft's are normal  -eventual ct guided bx of mass, AC needs to be held for 5 days  -diet as tolerated from a gi standpoint  -pain mgt per primary service          This plan was formulated in collaboration with Dr. Dylon Mallory . Electronically signed by: BENNETT Short CNP on 6/24/2022 at 8:48 AM     Attending Physician Statement  I have discussed the care of Nolan De Oliveira and   I have examined the patient myselft independently, and taken ros and hpi , including pertinent history and exam findings,  with the author of this note . I have reviewed the key elements of all parts of the encounter with the nurse practitioner/resident.     I agree with the assessment, plan and orders as documented by the above health care provider       CT-guided biopsy from the liver lesion  Oncology following  Based on the result might need an outpatient endoscopic ultrasound of the pancreas  And based on the result also might need an upper and lower endoscopy as an outpatient  Electronically signed by Dio Good MD

## 2022-06-24 NOTE — PLAN OF CARE
Problem: Discharge Planning  Goal: Discharge to home or other facility with appropriate resources  6/24/2022 0454 by Monica Toney RN  Outcome: Progressing  Flowsheets (Taken 6/24/2022 0454)  Discharge to home or other facility with appropriate resources:   Identify barriers to discharge with patient and caregiver   Identify discharge learning needs (meds, wound care, etc)  Note: Patient updated on plan of care , denies any questions or needs at this time      Problem: Safety - Adult  Goal: Free from fall injury  6/24/2022 0454 by Monica Toney RN  Outcome: Progressing  Note: Patient remains safe and free from injury      Problem: Skin/Tissue Integrity  Goal: Absence of new skin breakdown  Description: 1. Monitor for areas of redness and/or skin breakdown  2. Assess vascular access sites hourly  3. Every 4-6 hours minimum:  Change oxygen saturation probe site  4. Every 4-6 hours:  If on nasal continuous positive airway pressure, respiratory therapy assess nares and determine need for appliance change or resting period.   6/24/2022 0454 by Monica Toney RN  Outcome: Progressing  Note: Patient has no signs of new skin breakdown      Problem: Pain  Goal: Verbalizes/displays adequate comfort level or baseline comfort level  6/24/2022 0454 by Monica Toney RN  Outcome: Progressing  Flowsheets (Taken 6/24/2022 0454)  Verbalizes/displays adequate comfort level or baseline comfort level:   Encourage patient to monitor pain and request assistance   Assess pain using appropriate pain scale   Administer analgesics based on type and severity of pain and evaluate response  Note: Patient has intermittent complaints of pain , medicated with prn pain meds , will continue to monitor for control

## 2022-06-24 NOTE — TELEPHONE ENCOUNTER
Name: Mindy Moscoso  : 1950  MRN: <[id number]>    Oncology Navigation Follow-Up Note    Contact Type:  Telephone    Notes: Writer received an oncology navigation referral from Dr. Elizabeth Armando. Writer will track pt discharge and contact patient once discharged.     Electronically signed by May Ceja RN on 2022 at 11:40 AM

## 2022-06-24 NOTE — CARE COORDINATION
DISCHARGE PLANNING NOTE:      Writer LM with [facility]) IR when attempt was made to schedule outpatient liver biopsy. Patients information was given requesting they reach out to patient to schedule procedure. Information was also left on patients discharge instructions. Writer will also fax facesheet and order to [facility] ([facility] IR at [phone].     Electronically signed by Alvarez Villagomez RN on 6/24/2022 at 2:11 PM
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
## 2022-06-24 NOTE — CARE COORDINATION
ONGOING DISCHARGE PLAN:    Patient is alert and oriented x4. Spoke with patient regarding discharge plan and patient confirms that plan is still home without any needs. DME; walker, cane, sc, gb, glucometer **needs supplies on discharge. MRI AB unable to be completed d/t patient refusing to remove piercings. IR biopsy for liver lesion ordered - elevated CA 19 and CEA    Palliative consulted. [facility] 13% FU made 06/28 @ 3:30 PM    Will continue to follow for additional discharge needs.     Electronically signed by Agusto Davis RN on 6/24/2022 at 9:15 AM

## 2022-06-24 NOTE — PROGRESS NOTES
[facility]   OCCUPATIONAL THERAPY MISSED TREATMENT NOTE   INPATIENT   Date: 22  Patient Name: Mahin Dickson       Room: 2880/3350-17  MRN: [id number]   Account #: [de-identified]    : 1950  (81 y.o.)  Gender: female                 REASON FOR MISSED TREATMENT:  22   -   OT being discontinued at this time. Patient functioning at Premorbid Level  No further needs . Pt up independently in room and bathroom, reports no concerns with completion of self-care tasks and no concerns upon return home. No OT goals identified at this time, no need for skilled OT required.     08:45    Tanya Rosa, OTR/L

## 2022-06-24 NOTE — PROGRESS NOTES
Physical Therapy        Physical Therapy Cancel Note      DATE: 2022    NAME: Zaki Casillas  MRN: [id number]   : 1950      Patient not seen this date for Physical Therapy due to:    22 D/C PT; Patient independent with functional mobility. Pt reports no concerns and demonstrates Independent mobility in room. Will defer PT evaluation at this time. Please reorder PT if future needs arise.        Electronically signed by Max Soriano PT on 2022 at 9:21 AM

## 2022-06-24 NOTE — CONSULTS
Palliative Care Inpatient Consult    NAME:  [id number] RECORD NUMBER:  [id number]  AGE: 81 y.o. GENDER: female  : 1950  TODAY'S DATE:  2022    Reasons for Consultation:    Provision of information regarding PC and/or hospice philosophies  Complex, time-intensive communication and interdisciplinary psychosocial support  Clarification of goals of care and/or assistance with difficult decision-making  Guidance in regards to resources and transition(s)    Code Status: Full Code    Summary:  Met with patient, discussed what has been found out to this point. Emily Hahn relates she is overwhelmed-acknowledged patients feelings. Let her know we are here for support, not to push. Encouraged when she is ready we are here to help. Left contact information and handout on palliative care. Informed patient she is a candidate for palliative care. Encouraged her to call office at any time for someone to talk to as needed. Encouraged to finish work up to determine diagnosis and options. Discussed importance of completing [address], paperwork at bedside. Pt relates not ready to make any decision at present. Per my nursing judgement patient is too fragile and not appropriate to address code status at this time. Will continue to follow. Members of PC team contributing to this consultation are :  Paris Spears rn    History of Present Illness     The patient is a 81 y.o. Non- / non  female who presents with Abdominal Pain and Emesis    Referred to Palliative Care by   [x] Physician   [] Nursing  [] Family Request   [] Other:       She was admitted to the primary service for Hypokalemia [E87.6]. Her hospital course has been associated with Hypokalemia.  The patient has a complicated medical history and has been hospitalized since 2022  2:48 PM.    Active Hospital Problems    Diagnosis Date Noted    Choledocholithiasis [K80.50]      Priority: Medium    Liver mass [R16.0]      Priority: Medium    Anemia [D64.9]      Priority: Medium    Weight loss [R63.4]      Priority: Medium    Generalized abdominal pain [R10.84]      Priority: Medium    Nausea and vomiting [R11.2]      Priority: Medium    Thrombocytosis [D75.839]      Priority: Medium    Hypokalemia [E87.6] 06/22/2022     Priority: Medium    Diabetes mellitus type 2 in obese ([facility] [E11.69, E66.9] 02/18/2019    Morbid obesity ([facility] [E66.01] 02/18/2019       Data        Code Status: Full Code     ADVANCED CARE PLANNING:  Patient has capacity for medical decisions: yes  Health Care Power of : no  Living Will: no     Personal, Social, and Family History  Marital Status: single  Living situation:alone: rents a room in a house, steps are not an issue. Catherine/Spiritual History:   Not addressed  Psychological Distress: severe  Does patient understand diagnosis/treatment? yes  Does family/caregiver understand diagnosis/treatment? no one with patient.       Assessment        Palliative Performance Scale:    ___100% Full ambulation; normal activity and work; no evidence of disease; able to do own self care; normal intake; fully conscious  _x__90% Full ambulation; normal activity and work; some evidence of disease; able to do own self care; normal intake; fully conscious  ___80% Full ambulation; normal activity with effort; some evidence of disease; able to do own self care; normal or reduced intake; fully conscious  ___70% Ambulation reduced; unable to perform normal job/work; significant disease; able to do own self care; normal or reduced intake; fully conscious  ___60%  Ambulation reduced; cannot do hobbies/housework; significant disease; occasional assist; intake normal or reduced; fully conscious/some confusion  ___50%  Mainly sit/lie; can't do any work; extensive disease; considerable assist; intake normal or reduced; fully conscious/some confusion  ___40%  Mainly in bed; extensive disease; mainly assist; intake normal or reduced; fully conscious/ some confusion   ___30%  Bed bound; extensive disease; total care; intake reduced; fully conscious/some confusion  ___20%  Bed bound; extensive disease; total care; intake minimal; drowsy/coma  ___10%  Bed bound; extensive disease; total care; mouth care only; drowsy/coma  ___0       Death       Risk Assessments:    Bailey Risk Score: [unfilled]    Readmission Risk Score: 13 ( )        1 Year Mortality Risk Score: @1YEARMORTALITYRISK@     Plan        Palliative Interaction: Reviewed course of care, update received from staff rn, update given to Lesvia Meléndez NP from hospitalist group. Introduced myself to patient. Asked for permission to sit and talk with her. She gave me permission but for only a brief time. She relates she is very overwhelmed and out of sorts. Encouraged her that she has the right to make her own decisions. Encouraged her to do follow up to determine diagnosis meet with Dr. Bryanna Reynolds to determine what her options are. Let her know that palliative care is here to support her what ever she decides to do moving forward. Inquired about patient's support systems. Discussed [address] law is what we use to establish decision makers if she is in a situation where she can not be her own decision maker. Let Neto know that without [address] her 3 adult children would be her joint decision makers according to [place] [facility] [place] [facility] law. Let her know that if she does not think that the three of them would be able to work together and follow her wishes then it would be in her best interest to fill out  [address] and select the person that she feels would best represent her. I expressed that she can choose whom ever she wants to be her  surrogate decision maker. [address] is her choice and can be friend or family. Discussed family support/ friend support. She relates her son has his hands full right know with 5 children and divorce.   She does relate she has friend support. Patient r/t's she is extremely overwhelmed and scared/worried. She has so much on her mind, she can not remember faces or conversations. Acknowledged this. Left contact numbers and brouchure to help her down the road. Pt relates she can not make any decisions right now. Discussed that she has right to determine what care she receives  Discussed Palliative Care as support, symptom management, improving quality of life. Explained that she qualifies for palliative care. Explained various ways one can receive palliative care. Left brochure and card with my name and contact number. Encouraged patient to call us if she just needs someone to listen. Let her know we are here for her. Education/support to staff  Education/support to patient  Communications with primary service  Providing support for coping/adaptation/distress of family  Validating patient/family distress  Continued communication updates  discussed determining a decision maker Tenny Coma in case patient can not make her own decisions in the future. Offered support, encouraged her to call palliative care office as needed  Information left in written format. Palliative care will continue to follow. Principle Problem/Diagnosis:  Hypokalemia [E87.6]    Goals of care evaluation:  The patient goals of care are to get diagnosis and find out options. Goals of care discussed with:    [x] Patient independently    [] Patient and Family    [] Family or Healthcare DPOA independently    [] Unable to discuss with patient, family/DPOA not present    Code Status  Full Code    Other recommendations:  Please call with any palliative questions or concerns. Palliative Care Team is available via perfect serve or via phone - [phone]. Palliative Care will continue to follow Ms. Velázquez's care as needed. Thank you for allowing Palliative Care to participate in the care of Ms. Denise Posadas .     Electronically signed by Harry Torres RN  Palliative Care Team  on 6/24/2022 at 11:37 AM    Palliative care office: [phone]

## 2022-06-24 NOTE — PROGRESS NOTES
_                         Today's Date: 6/24/2022  Patient Name: Paddy Henry  Date of admission: 6/22/2022  2:48 PM  Patient's age: 81 y.o., 1950  Admission Dx: Hypokalemia [E87.6]      Requesting Physician: Shonna Jimenez MD    CHIEF COMPLAINT: Abdominal pain. Weight loss and decreased appetite. Consult for gastric cancer with liver metastasis. SUBJECTIVE:  . Patient was seen and examined. Clinically stable. Continues to have pain in the right upper abdomen. No nausea or vomiting. No jaundice. No fever. BRIEF CASE HISTORY:    The patient is a 81 y.o.  female who is admitted to the hospital for further management of abdominal pain and evidence of liver metastasis. Patient had abdominal discomfort mainly in the upper abdomen for the last 2 months. She had no nausea or vomiting. No GI bleeding. She had decreased appetite and she is losing weight. He had no hematuria or urinary symptoms. No vaginal bleeding or spotting. She had no melena or hematochezia. No hematemesis. Patient never had any colonoscopy. She was evaluated with CT scan of the abdomen pelvis which showed evidence of liver metastasis. Gallbladder was not identified. The CT scan was read as follows   Impression   1.  Findings compatible with metastatic disease in the liver.  The   gallbladder is not discretely visualized separate from this locally   aggressive process, which may be directly involved or the primary source of   disease.  Direct extension from the liver appears to involve the adjacent   stomach and duodenum.       2.  Findings compatible with peritoneal carcinomatosis with discrete   peritoneal implants.  Trace abdominopelvic ascites.       Patient had hospitalization and had further blood work-up with tumor markers which revealed extremely high tumor marker CA 19-9 with moderate increase of alpha-fetoprotein and CEA.         Past Medical History: has a past medical history of Acid reflux, Arthritis, Cataracts, bilateral, CHF (congestive heart failure) ([facility] [address] COPD (chronic obstructive pulmonary disease) ([facility] [address] Gall stones, Head injury, Hyperlipidemia, Hypertension, Insomnia, MI (myocardial infarction) ([facility] [address] Neuropathy, Sleep apnea, and Type 2 diabetes mellitus without complication ([facility] [address] Past Surgical History:   has a past surgical history that includes Femur Surgery; Foot surgery (Left); Mandible fracture surgery; Cardiac surgery (1999); Ankle surgery (Left); Tonsillectomy; and Coronary angioplasty with stent (1999). Family History: family history includes Other in her father; Stroke in her mother. Social History:   reports that she quit smoking about 22 years ago. Her smoking use included cigarettes. She has a 40.00 pack-year smoking history. She has never used smokeless tobacco. She reports current alcohol use of about 1.0 standard drink of alcohol per week. She reports current drug use. Drug: Marijuana Thomas Relic). Medications:    Prior to Admission medications    Medication Sig Start Date End Date Taking? Authorizing Provider   glucose monitoring (FREESTYLE FREEDOM) kit 1 kit by Does not apply route daily as needed (diabetic) 6/24/22  Yes General Tracee MD   FreeStyle Lancets MISC 1 each by Does not apply route daily 6/24/22  Yes General Tracee MD   Blood Glucose Monitoring Suppl (FREESTYLE LITE) STACY 1 Device by Does not apply route daily as needed (diabetic) 6/24/22  Yes General Tracee MD   Blood Glucose Monitoring Suppl (FREESTYLE FREEDOM LITE) w/Device KIT 1 kit by Does not apply route daily 6/24/22  Yes General Tracee MD   Insulin Syringe-Needle U-100 30G X 5/16\" 0.5 ML MISC 1 each by Does not apply route daily 6/24/22  Yes General Tracee MD   oxyCODONE HCl 5 MG TABA Take 5 mg by mouth 4 times daily as needed (moderate to severe pain) for up to 7 days.  6/24/22 7/1/22 Yes General Tracee MD   gabapentin (NEURONTIN) 600 MG tablet Take 600 mg by mouth 3 times daily. Yes Historical Provider, MD   albuterol sulfate  (90 Base) MCG/ACT inhaler Inhale 2 puffs into the lungs every 6 hours as needed for Wheezing or Shortness of Breath 5/26/21   BENNETT Zamora CNP   ranolazine (RANEXA) 500 MG extended release tablet Take 1 tablet by mouth 2 times daily 3/12/21   BENNETT Zamora CNP   lisinopril (PRINIVIL;ZESTRIL) 10 MG tablet take 1 tablet by mouth once daily 3/12/21   BENNETT Zamora CNP   venlafaxine (EFFEXOR XR) 150 MG extended release capsule Take 1 capsule by mouth daily Take with 75 mg capsule 2/26/21   BENNETT Zamora CNP   ARIPiprazole (ABILIFY) 5 MG tablet Take 1 tablet by mouth daily 2/26/21   BENNETT Zamora CNP   budesonide-formoterol [facility]) 160-4.5 MCG/ACT AERO Inhale 2 puffs into the lungs 2 times daily 2/26/21   BENNETT Zamora CNP   tiotropium (SPIRIVA RESPIMAT) 2.5 MCG/ACT AERS inhaler Inhale 2 puffs into the lungs daily 2/26/21   BENNETT Zamora CNP   traZODone (DESYREL) 50 MG tablet take 1 tablet by mouth at bedtime if needed for sleep 12/30/20   Historical Provider, MD   oxybutynin (DITROPAN) 5 MG tablet take 1 tablet by mouth twice a day 2/26/21   BENNETT Zamora CNP   glucose monitoring kit (FREESTYLE) monitoring kit 1 kit by Does not apply route daily 10/3/20   BENNETT Zamora CNP   blood glucose monitor strips Test 3 times a day & as needed for symptoms of irregular blood glucose. Dispense sufficient amount for indicated testing frequency plus additional to accommodate PRN testing needs.  10/3/20   BENNETT Zamora CNP   Lancets MISC 1 each by Does not apply route 3 times daily 10/3/20   BENNETT Zamora CNP   atorvastatin (LIPITOR) 80 MG tablet Take 1 tablet by mouth daily 9/30/20   Mari Solian, APRN - CNP   bisoprolol (ZEBETA) 5 MG tablet take 1 tablet by mouth once daily 9/30/20   BENNETT Zamora - CNP   omeprazole (PRILOSEC) 40 MG delayed release capsule take 1 capsule by mouth every morning BEFORE BREAKFAST 9/30/20   BENNETT Soriano CNP   venlafaxine (EFFEXOR XR) 75 MG extended release capsule Take 1 capsule by mouth daily 9/30/20   BENNETT Soriano CNP   glimepiride (AMARYL) 2 MG tablet take 1 tablet by mouth twice a day before meals 9/30/20   BENNETT Soriano CNP   Semaglutide, 1 MG/DOSE, (OZEMPIC, 1 MG/DOSE,) 2 MG/1.5ML SOPN Inject 1 mg per week 9/30/20   BENNETT Soriano CNP   Multiple Vitamins-Minerals (THERAPEUTIC MULTIVITAMIN-MINERALS) tablet Take 1 tablet by mouth daily 9/30/20   BENNETT Soriano CNP   Melatonin 5 MG SUBL Place 1 applicator under the tongue nightly as needed (insomnia) 9/30/20   BENNETT Soriano CNP   Insulin Pen Needle 32G X 4 MM MISC 1 each by Does not apply route once a week 3/12/20   Pastor Dalton MD   fluticasone [facility]) 50 MCG/ACT nasal spray 2 sprays by Nasal route daily 9/13/19   Pastor Dalton MD     Current Facility-Administered Medications   Medication Dose Route Frequency Provider Last Rate Last Admin    oxyCODONE (ROXICODONE) immediate release tablet 5 mg  5 mg Oral Q4H PRN Herbert Montero MD        morphine (PF) injection 2 mg  2 mg IntraVENous Q4H PRN BENNETT Fishman CNP        Or    morphine sulfate (PF) injection 4 mg  4 mg IntraVENous Q4H PRN BENNETT Fishman CNP   4 mg at 06/24/22 1014    albuterol sulfate HFA (PROVENTIL;VENTOLIN;PROAIR) 108 (90 Base) MCG/ACT inhaler 2 puff  2 puff Inhalation Q6H PRN BENNETT Fishman CNP        tiotropium (SPIRIVA RESPIMAT) 2.5 MCG/ACT inhaler 2 puff  2 puff Inhalation Daily BENNETT Fishman CNP   2 puff at 06/24/22 0730    budesonide-formoterol (SYMBICORT) 160-4.5 MCG/ACT inhaler 2 puff  2 puff Inhalation BID BENNETT Fishman CNP   2 puff at 06/24/22 0731    fluticasone (FLONASE) 50 MCG/ACT nasal spray 2 spray  2 spray Nasal Daily BENNETT Fishman CNP        glucose chewable tablet 16 g  4 tablet Oral PRN BENNETT Schaeffer CNP        dextrose bolus 10% 125 mL  125 mL IntraVENous PRN BENNETT Schaeffer CNP        Or    dextrose bolus 10% 250 mL  250 mL IntraVENous PRN BENNETT Schaeffer CNP        glucagon (rDNA) injection 1 mg  1 mg IntraMUSCular PRN BENNETT Schaeffer CNP        dextrose 5 % solution  100 mL/hr IntraVENous PRN BENNETT Schaeffer CNP        insulin lispro (HUMALOG) injection vial 0-12 Units  0-12 Units SubCUTAneous TID WC BENNETT Schaeffer CNP   1 Units at 06/24/22 0945    insulin lispro (HUMALOG) injection vial 0-6 Units  0-6 Units SubCUTAneous Nightly BENNETT Schaeffer CNP   1 Units at 06/23/22 2039    potassium chloride 10 mEq/100 mL IVPB (Peripheral Line)  10 mEq IntraVENous PRN BENNETT Schaeffer CNP        ARIPiprazole (ABILIFY) tablet 5 mg  5 mg Oral Daily BENNETT Schaeffer CNP   5 mg at 06/24/22 1007    [Held by provider] aspirin EC tablet 81 mg  81 mg Oral Daily BENNETT Schaeffer CNP        atorvastatin (LIPITOR) tablet 80 mg  80 mg Oral Daily BENNETT Schaeffer CNP   80 mg at 06/24/22 0941    metoprolol succinate (TOPROL XL) extended release tablet 50 mg  50 mg Oral Daily BENNETT Schaeffer CNP   50 mg at 06/24/22 1365    [Held by provider] clopidogrel (PLAVIX) tablet 75 mg  75 mg Oral Daily BENNETT Schaeffer CNP        gabapentin (NEURONTIN) tablet 600 mg  600 mg Oral TID BENNETT Schaeffer CNP   600 mg at 06/24/22 1450    lisinopril (PRINIVIL;ZESTRIL) tablet 10 mg  10 mg Oral Daily BENNETT Schaeffer CNP   10 mg at 06/24/22 8237    pantoprazole (PROTONIX) tablet 40 mg  40 mg Oral QAM AC BENNETT Schaeffer CNP   40 mg at 06/24/22 2252    oxybutynin (DITROPAN) tablet 5 mg  5 mg Oral BID BENNETT Schaeffer CNP   5 mg at 06/24/22 0944    ranolazine (RANEXA) extended release tablet 500 mg  500 mg Oral BID BENNETT Schaeffer CNP   500 mg at 06/24/22 3684    traZODone (DESYREL) tablet 50 mg  50 mg Oral Nightly PRN Dorothea Salvador, APRN - CNP        venlafaxine (EFFEXOR XR) extended release capsule 150 mg  150 mg Oral Daily Floyd Houston, APRN - CNP   150 mg at 06/24/22 0943    venlafaxine (EFFEXOR XR) extended release capsule 75 mg  75 mg Oral Daily Floyd Houston APRN - CNP   75 mg at 06/24/22 9723    sodium chloride flush 0.9 % injection 10 mL  10 mL IntraVENous PRN Elif Souza DO   10 mL at 06/22/22 1755    sodium chloride flush 0.9 % injection 5-40 mL  5-40 mL IntraVENous 2 times per day Floyd Houston, APRN - CNP   10 mL at 06/23/22 2035    sodium chloride flush 0.9 % injection 10 mL  10 mL IntraVENous PRN Floyd Houston, APRN - CNP        0.9 % sodium chloride infusion   IntraVENous PRN Floyd Houston, APRN - CNP        enoxaparin (LOVENOX) injection 40 mg  40 mg SubCUTAneous Daily Floyd Houston, APRN - CNP   40 mg at 06/24/22 0945    magnesium hydroxide (MILK OF MAGNESIA) 400 MG/5ML suspension 30 mL  30 mL Oral Daily PRN [place], APRN - CNP        acetaminophen (TYLENOL) tablet 650 mg  650 mg Oral Q6H PRN [place], APRN - CNP        Or    acetaminophen (TYLENOL) suppository 650 mg  650 mg Rectal Q6H PRN Floyd Houston, APRN - CNP        0.9 % sodium chloride infusion   IntraVENous Continuous [place], APRN - CNP   Stopped at 06/24/22 1655       Allergies:  Chantix [varenicline tartrate] and Sulphadimidine [sulfamethazine]    REVIEW OF SYSTEMS:      · General: Positive for weakness and fatigue. Positive for weight loss and decreased appetite. . No fever or chills. · Eyes: No blurred vision, eye pain or double vision. · Ears: No hearing problems or drainage. No tinnitus. · Throat: No sore throat, problems with swallowing or dysphagia. · Respiratory: No cough, sputum or hemoptysis. No shortness of breath. No pleuritic chest pain. · Cardiovascular: No chest pain, orthopnea or PND. No lower extremity edema. No palpitation. · Gastrointestinal: As above. .   · Genitourinary: No dysuria, hematuria, frequency or urgency. · Musculoskeletal: No muscle aches or pains. No limitation of movement. No back pain. No gait disturbance, No joint complaints. · Dermatologic: No skin rashes or pruritus. No skin lesions or discolorations. · Psychiatric: No depression, anxiety, or stress or signs of schizophrenia. No change in mood or affect. · Hematologic: No history of bleeding tendency. No bruises or ecchymosis. No history of clotting problems. · Infectious disease: No fever, chills or frequent infections. · Endocrine: No polydipsia or polyuria. No temperature intolerance. · Neurologic: No headaches or dizziness. No weakness or numbness of the extremities. No changes in balance, coordination,  memory, mentation, behavior. · Allergic/Immunologic: No nasal congestion or hives. No repeated infections.        PHYSICAL EXAM:      BP 90/62   Pulse 78   Temp 98.2 °F (36.8 °C) (Oral)   Resp 16   Ht 5' (1.524 m)   Wt 186 lb 1.1 oz (84.4 kg)   SpO2 90%   BMI 36.34 kg/m²    Temp (24hrs), Av.1 °F (36.7 °C), Min:97.8 °F (36.6 °C), Max:98.4 °F (36.9 °C)      General appearance - not in pain or distress  Mental status - alert and oriented  Eyes - pupils equal and reactive, extraocular eye movements intact  Ears - bilateral TM's and external ear canals normal  Nose - normal and patent, no erythema, discharge or polyps  Mouth - mucous membranes moist, pharynx normal without lesions  Neck - supple, no significant adenopathy  Lymphatics - no palpable lymphadenopathy, no hepatosplenomegaly  Chest - clear to auscultation, no wheezes, rales or rhonchi, symmetric air entry  Heart - normal rate, regular rhythm, normal S1, S2, no murmurs, rubs, clicks or gallops  Abdomen - soft, mild right upper quadrant abdominal tenderness, nondistended, no masses or organomegaly  Neurological - alert, oriented, normal speech, no focal findings or movement disorder noted  Musculoskeletal - no joint tenderness, deformity or swelling  Extremities - peripheral pulses normal, no pedal edema, no clubbing or cyanosis  Skin - normal coloration and turgor, no rashes, no suspicious skin lesions noted           DATA:      Labs:       CBC:   Recent Labs     06/23/22  0556 06/24/22  0543   WBC 9.1 12.8*   HGB 11.2* 11.7*   HCT 34.3* 35.8*   * 471*     BMP:   Recent Labs     06/23/22  0556 06/24/22  0543    135   K 3.4* 4.1   CO2 30 29   BUN 7* 5*   CREATININE 0.64 0.55   LABGLOM >60 >60   GLUCOSE 105* 142*     PT/INR:   Recent Labs     06/24/22  0739   PROTIME 15.4*   INR 1.2     APTT:  Recent Labs     06/24/22  0739   APTT 30.9     LIVER PROFILE:  Recent Labs     06/22/22  1545   AST 26   ALT 9   LABALBU 3.1*     CT ABDOMEN PELVIS W IV CONTRAST Additional Contrast? None  Narrative: EXAMINATION:  CT OF THE ABDOMEN AND PELVIS WITH CONTRAST 6/22/2022 5:49 pm    TECHNIQUE:  CT of the abdomen and pelvis was performed with the administration of  intravenous contrast. Multiplanar reformatted images are provided for review. Automated exposure control, iterative reconstruction, and/or weight based  adjustment of the mA/kV was utilized to reduce the radiation dose to as low  as reasonably achievable. COMPARISON:  None. HISTORY:  ORDERING SYSTEM PROVIDED HISTORY: abd pain  TECHNOLOGIST PROVIDED HISTORY:  abd pain    Decision Support Exception - unselect if not a suspected or confirmed  emergency medical condition->Emergency Medical Condition (MA)  Reason for Exam: abd pain  Additional signs and symptoms: emisis x1 wk, e coli    FINDINGS:  Lower Chest: Small right pleural effusion. Organs: Multiple suspicious liver lesions are present throughout the liver  with confluent mass and capsular retraction measuring up to 5.5 cm involving  segment 4/5. The gallbladder is not discretely visualized and may be  directly involved. There also appears to be soft tissue extension abutting  the stomach and duodenum on axial image 63.     Biliary dilatation is present with choledocholithiasis noted in the distal  common duct. The pancreas, spleen, adrenals and kidneys reveal no acute findings. GI/Bowel: There is no bowel dilatation or wall thickening identified. Pelvis: No acute findings. Peritoneum/Retroperitoneum: Soft tissue nodularity in the right abdomen is  noted, compatible with peritoneal implants. Trace ascites. No free air or  loculated fluid collection. Infrarenal aortic aneurysm measures up to 3.1 cm. Bones/Soft Tissues: No abnormality identified. Benign-appearing rim  calcification in the right breast.  Impression: 1. Findings compatible with metastatic disease in the liver. The  gallbladder is not discretely visualized separate from this locally  aggressive process, which may be directly involved or the primary source of  disease. Direct extension from the liver appears to involve the adjacent  stomach and duodenum. 2.  Findings compatible with peritoneal carcinomatosis with discrete  peritoneal implants. Trace abdominopelvic ascites. 3.  Choledocholithiasis with biliary dilatation. 4.  Infrarenal aortic aneurysm measuring up to 3.1 cm. Please see  recommendation below. RECOMMENDATIONS:  3.1 cm infrarenal abdominal aortic aneurysm. Recommend follow-up every 3  years. Reference: J Am Vish Radiol 8719;61:[id number].             IMPRESSION:    Primary Problem  Hypokalemia    Active Hospital Problems    Diagnosis Date Noted    Choledocholithiasis [K80.50]      Priority: Medium    Liver mass [R16.0]      Priority: Medium    Anemia [D64.9]      Priority: Medium    Weight loss [R63.4]      Priority: Medium    Generalized abdominal pain [R10.84]      Priority: Medium    Nausea and vomiting [R11.2]      Priority: Medium    Thrombocytosis [D75.839]      Priority: Medium    Hypokalemia [E87.6] 06/22/2022     Priority: Medium    Diabetes mellitus type 2 in obese ([facility] [address] [E11.69, E66.9] 02/18/2019    Morbid obesity ([address] [E66.01] 02/18/2019 RECOMMENDATIONS:  1. Records and labs and images were reviewed and discussed the patient. 2. Images are consistent with intra-abdominal malignancy with probable liver metastasis. MRI was ordered but patient declined. Tumor markers showed very high CA 19-9 and elevated CEA and alpha-fetoprotein. 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 3. Explained all of the above to the patient. Still she is restricting MRI. 4. Patient was referred to IR for CT-guided needle biopsy. It could not be done today because of the use of Plavix. Biopsy will be done as outpatient. Further recommendations will be after establishing the diagnosis. Discussed with patient and Nurse. Pasha Mahajan MD, MD                            [address] [place] [address] [facility]                            This note is created with the assistance of a speech recognition program.  While intending to generate a document that actually reflects the content of the visit, the document can still have some errors including those of syntax and sound a like substitutions which may escape proof reading. It such instances, actual meaning can be extrapolated by contextual diversion.

## 2022-06-24 NOTE — PROGRESS NOTES
_                         Today's Date: 6/24/2022  Patient Name: Karen Ramos  Date of admission: 6/22/2022  2:48 PM  Patient's age: 81 y.o., 1950  Admission Dx: Hypokalemia [E87.6]      Requesting Physician: Allan Cesar MD    CHIEF COMPLAINT: Abdominal pain. Weight loss and decreased appetite. Consult for gastric cancer with liver metastasis. History Obtained From:  patient, electronic medical record    HISTORY OF PRESENT ILLNESS:      The patient is a 81 y.o.  female who is admitted to the hospital for further management of abdominal pain and evidence of liver metastasis. Patient had abdominal discomfort mainly in the upper abdomen for the last 2 months. She had no nausea or vomiting. No GI bleeding. She had decreased appetite and she is losing weight. He had no hematuria or urinary symptoms. No vaginal bleeding or spotting. She had no melena or hematochezia. No hematemesis. Patient never had any colonoscopy. She was evaluated with CT scan of the abdomen pelvis which showed evidence of liver metastasis. Gallbladder was not identified. The CT scan was read as follows   Impression   1.  Findings compatible with metastatic disease in the liver.  The   gallbladder is not discretely visualized separate from this locally   aggressive process, which may be directly involved or the primary source of   disease.  Direct extension from the liver appears to involve the adjacent   stomach and duodenum.       2.  Findings compatible with peritoneal carcinomatosis with discrete   peritoneal implants.  Trace abdominopelvic ascites.       Patient had hospitalization and had further blood work-up with tumor markers which revealed extremely high tumor marker CA 19-9 with moderate increase of alpha-fetoprotein and CEA.         Past Medical History:   has a past medical history of Acid reflux, Arthritis, Cataracts, bilateral, CHF (congestive heart failure) ([facility] COPD (chronic obstructive pulmonary disease) ([facility]), Gall stones, Head injury, Hyperlipidemia, Hypertension, Insomnia, MI (myocardial infarction) ([facility] [facility] Neuropathy, Sleep apnea, and Type 2 diabetes mellitus without complication [facility] Past Surgical History:   has a past surgical history that includes Femur Surgery; Foot surgery (Left); Mandible fracture surgery; Cardiac surgery (1999); Ankle surgery (Left); Tonsillectomy; and Coronary angioplasty with stent (1999). Family History: family history includes Other in her father; Stroke in her mother. Social History:   reports that she quit smoking about 22 years ago. Her smoking use included cigarettes. She has a 40.00 pack-year smoking history. She has never used smokeless tobacco. She reports current alcohol use of about 1.0 standard drink of alcohol per week. She reports current drug use. Drug: Marijuana [employer]). Medications:    Prior to Admission medications    Medication Sig Start Date End Date Taking? Authorizing Provider   gabapentin (NEURONTIN) 600 MG tablet Take 600 mg by mouth 3 times daily.    Yes Historical Provider, MD   albuterol sulfate  (90 Base) MCG/ACT inhaler Inhale 2 puffs into the lungs every 6 hours as needed for Wheezing or Shortness of Breath 5/26/21   BENNETT Fregoso CNP   ranolazine (RANEXA) 500 MG extended release tablet Take 1 tablet by mouth 2 times daily 3/12/21   BENNETT Fregoso CNP   lisinopril (PRINIVIL;ZESTRIL) 10 MG tablet take 1 tablet by mouth once daily 3/12/21   BENNETT Fregoso CNP   venlafaxine (EFFEXOR XR) 150 MG extended release capsule Take 1 capsule by mouth daily Take with 75 mg capsule 2/26/21   BENNETT Fregoso CNP   ARIPiprazole (ABILIFY) 5 MG tablet Take 1 tablet by mouth daily 2/26/21   BENNETT Fregoso CNP   budesonide-formoterol [facility]) 160-4.5 MCG/ACT AERO Inhale 2 puffs into the lungs 2 times daily 2/26/21   BENNETT Fregoso CNP   tiotropium (SPIRIVA RESPIMAT) 2.5 MCG/ACT AERS inhaler Inhale 2 puffs into the lungs daily 2/26/21   BENNETT Pugh CNP   traZODone (DESYREL) 50 MG tablet take 1 tablet by mouth at bedtime if needed for sleep 12/30/20   Historical Provider, MD   oxybutynin (DITROPAN) 5 MG tablet take 1 tablet by mouth twice a day 2/26/21   BENNETT Pugh CNP   glucose monitoring kit (FREESTYLE) monitoring kit 1 kit by Does not apply route daily 10/3/20   BENNETT Pugh CNP   blood glucose monitor strips Test 3 times a day & as needed for symptoms of irregular blood glucose. Dispense sufficient amount for indicated testing frequency plus additional to accommodate PRN testing needs.  10/3/20   BENNETT Pugh CNP   Lancets MISC 1 each by Does not apply route 3 times daily 10/3/20   BENNETT Pugh - CNP   atorvastatin (LIPITOR) 80 MG tablet Take 1 tablet by mouth daily 9/30/20   Crispin Larsen, BENNETT - CNP   bisoprolol (ZEBETA) 5 MG tablet take 1 tablet by mouth once daily 9/30/20   Crispin Larsen APRN - CNP   omeprazole (PRILOSEC) 40 MG delayed release capsule take 1 capsule by mouth every morning BEFORE BREAKFAST 9/30/20   Crispin Larsen, BENNETT - CNP   venlafaxine (EFFEXOR XR) 75 MG extended release capsule Take 1 capsule by mouth daily 9/30/20   BENNETT Pugh - CNP   glimepiride (AMARYL) 2 MG tablet take 1 tablet by mouth twice a day before meals 9/30/20   Crispin Larsen, BENNETT - CNP   Semaglutide, 1 MG/DOSE, (OZEMPIC, 1 MG/DOSE,) 2 MG/1.5ML SOPN Inject 1 mg per week 9/30/20   Crispin Larsen, BENNETT - CNP   Multiple Vitamins-Minerals (THERAPEUTIC MULTIVITAMIN-MINERALS) tablet Take 1 tablet by mouth daily 9/30/20   Crispin Larsen, BENNETT - CNP   Melatonin 5 MG SUBL Place 1 applicator under the tongue nightly as needed (insomnia) 9/30/20   Crispin Larsen APRN - CNP   Insulin Pen Needle 32G X 4 MM MISC 1 each by Does not apply route once a week 3/12/20   La Delarosa MD   fluticasone (FLONASE) 50 MCG/ACT nasal spray 2 sprays by Nasal route daily 9/13/19   Loi Schreiber MD     Current Facility-Administered Medications   Medication Dose Route Frequency Provider Last Rate Last Admin    morphine (PF) injection 2 mg  2 mg IntraVENous Q4H PRN Cayetano Chroman, APRN - CNP        Or    morphine sulfate (PF) injection 4 mg  4 mg IntraVENous Q4H PRN Cayetano Chroman, APRN - CNP   4 mg at 06/24/22 1014    albuterol sulfate HFA (PROVENTIL;VENTOLIN;PROAIR) 108 (90 Base) MCG/ACT inhaler 2 puff  2 puff Inhalation Q6H PRN Cayetano Chroman, APRN - CNP        tiotropium (SPIRIVA RESPIMAT) 2.5 MCG/ACT inhaler 2 puff  2 puff Inhalation Daily [place] Chroman, APRN - CNP   2 puff at 06/24/22 0730    budesonide-formoterol (SYMBICORT) 160-4.5 MCG/ACT inhaler 2 puff  2 puff Inhalation BID [place] Chroman, APRN - CNP   2 puff at 06/24/22 0731    fluticasone (FLONASE) 50 MCG/ACT nasal spray 2 spray  2 spray Nasal Daily [place] Chroman, APRN - CNP        glucose chewable tablet 16 g  4 tablet Oral PRN Cayetano Chroman, APRN - CNP        dextrose bolus 10% 125 mL  125 mL IntraVENous PRN [place] Chroman, APRN - CNP        Or    dextrose bolus 10% 250 mL  250 mL IntraVENous PRN [place] Chroman, APRN - CNP        glucagon (rDNA) injection 1 mg  1 mg IntraMUSCular PRN [place] Chroman, APRN - CNP        dextrose 5 % solution  100 mL/hr IntraVENous PRN Cayetano Chroman, APRN - CNP        insulin lispro (HUMALOG) injection vial 0-12 Units  0-12 Units SubCUTAneous TID WC [place] Chroman, APRN - CNP   1 Units at 06/24/22 0945    insulin lispro (HUMALOG) injection vial 0-6 Units  0-6 Units SubCUTAneous Nightly Cayetano Chroman, APRN - CNP   1 Units at 06/23/22 2039    potassium chloride 10 mEq/100 mL IVPB (Peripheral Line)  10 mEq IntraVENous PRN [place] Chroman, APRN - CNP        ARIPiprazole (ABILIFY) tablet 5 mg  5 mg Oral Daily Cayetano Chroman, APRN - CNP   5 mg at 06/24/22 1007    [Held by provider] aspirin EC tablet 81 mg  81 mg Oral Daily Cayetano Wooten APRN - JOEL Wang atorvastatin (LIPITOR) tablet 80 mg  80 mg Oral Daily Delane Canning, APRN - CNP   80 mg at 06/24/22 0941    metoprolol succinate (TOPROL XL) extended release tablet 50 mg  50 mg Oral Daily Delane Canning, APRN - CNP   50 mg at 06/24/22 7833    [Held by provider] clopidogrel (PLAVIX) tablet 75 mg  75 mg Oral Daily Whitney Canning, APRN - CNP        gabapentin (NEURONTIN) tablet 600 mg  600 mg Oral TID Delane Canning, APRN - CNP   600 mg at 06/24/22 8851    lisinopril (PRINIVIL;ZESTRIL) tablet 10 mg  10 mg Oral Daily Delane Canning, APRN - CNP   10 mg at 06/24/22 0457    pantoprazole (PROTONIX) tablet 40 mg  40 mg Oral QAM AC Delane Canning, APRN - CNP   40 mg at 06/24/22 1079    oxybutynin (DITROPAN) tablet 5 mg  5 mg Oral BID Whitney Canning, APRN - CNP   5 mg at 06/24/22 0944    ranolazine (RANEXA) extended release tablet 500 mg  500 mg Oral BID Delane Canning, APRN - CNP   500 mg at 06/24/22 8087    traZODone (DESYREL) tablet 50 mg  50 mg Oral Nightly PRN Whitney Canning, APRN - CNP        venlafaxine (EFFEXOR XR) extended release capsule 150 mg  150 mg Oral Daily Delane Canning, APRN - CNP   150 mg at 06/24/22 0943    venlafaxine (EFFEXOR XR) extended release capsule 75 mg  75 mg Oral Daily Whitney Canning, APRN - CNP   75 mg at 06/24/22 7204    sodium chloride flush 0.9 % injection 10 mL  10 mL IntraVENous PRN Elif Souza DO   10 mL at 06/22/22 1755    sodium chloride flush 0.9 % injection 5-40 mL  5-40 mL IntraVENous 2 times per day Whitney Brady, APRN - CNP   10 mL at 06/23/22 2035    sodium chloride flush 0.9 % injection 10 mL  10 mL IntraVENous PRN [facility], APRN - CNP        0.9 % sodium chloride infusion   IntraVENous PRN Whitney Brady, APRN - CNP        enoxaparin (LOVENOX) injection 40 mg  40 mg SubCUTAneous Daily Whitney Abreu, APRN - CNP   40 mg at 06/24/22 0945    magnesium hydroxide (MILK OF MAGNESIA) 400 MG/5ML suspension 30 mL  30 mL Oral Daily PRN Whitney Abreu, APRN - CNP        acetaminophen (TYLENOL) tablet 650 mg  650 mg Oral Q6H PRN Dorothea Salvador, APRN - CNP        Or    acetaminophen (TYLENOL) suppository 650 mg  650 mg Rectal Q6H PRN Dorothea Salvador, APRN - CNP        0.9 % sodium chloride infusion   IntraVENous Continuous Dorothea Salvador, APRN - CNP 75 mL/hr at 06/23/22 2316 Rate Verify at 06/23/22 2316       Allergies:  Chantix [varenicline tartrate] and Sulphadimidine [sulfamethazine]    REVIEW OF SYSTEMS:      · General: Positive for weakness and fatigue. Positive for weight loss and decreased appetite. . No fever or chills. · Eyes: No blurred vision, eye pain or double vision. · Ears: No hearing problems or drainage. No tinnitus. · Throat: No sore throat, problems with swallowing or dysphagia. · Respiratory: No cough, sputum or hemoptysis. No shortness of breath. No pleuritic chest pain. · Cardiovascular: No chest pain, orthopnea or PND. No lower extremity edema. No palpitation. · Gastrointestinal: As above. .   · Genitourinary: No dysuria, hematuria, frequency or urgency. · Musculoskeletal: No muscle aches or pains. No limitation of movement. No back pain. No gait disturbance, No joint complaints. · Dermatologic: No skin rashes or pruritus. No skin lesions or discolorations. · Psychiatric: No depression, anxiety, or stress or signs of schizophrenia. No change in mood or affect. · Hematologic: No history of bleeding tendency. No bruises or ecchymosis. No history of clotting problems. · Infectious disease: No fever, chills or frequent infections. · Endocrine: No polydipsia or polyuria. No temperature intolerance. · Neurologic: No headaches or dizziness. No weakness or numbness of the extremities. No changes in balance, coordination,  memory, mentation, behavior. · Allergic/Immunologic: No nasal congestion or hives. No repeated infections.        PHYSICAL EXAM:      BP 90/62   Pulse 78   Temp 98.2 °F (36.8 °C) (Oral)   Resp 16   Ht 5' (1.524 m)   Wt 186 lb 1.1 oz (84.4 kg) SpO2 90%   BMI 36.34 kg/m²    Temp (24hrs), Av.1 °F (36.7 °C), Min:97.8 °F (36.6 °C), Max:98.4 °F (36.9 °C)      General appearance - not in pain or distress  Mental status - alert and oriented  Eyes - pupils equal and reactive, extraocular eye movements intact  Ears - bilateral TM's and external ear canals normal  Nose - normal and patent, no erythema, discharge or polyps  Mouth - mucous membranes moist, pharynx normal without lesions  Neck - supple, no significant adenopathy  Lymphatics - no palpable lymphadenopathy, no hepatosplenomegaly  Chest - clear to auscultation, no wheezes, rales or rhonchi, symmetric air entry  Heart - normal rate, regular rhythm, normal S1, S2, no murmurs, rubs, clicks or gallops  Abdomen - soft, mild right upper quadrant abdominal tenderness, nondistended, no masses or organomegaly  Neurological - alert, oriented, normal speech, no focal findings or movement disorder noted  Musculoskeletal - no joint tenderness, deformity or swelling  Extremities - peripheral pulses normal, no pedal edema, no clubbing or cyanosis  Skin - normal coloration and turgor, no rashes, no suspicious skin lesions noted           DATA:      Labs:       CBC:   Recent Labs     22  0556 22  0543   WBC 9.1 12.8*   HGB 11.2* 11.7*   HCT 34.3* 35.8*   * 471*     BMP:   Recent Labs     22  0556 22  0543    135   K 3.4* 4.1   CO2 30 29   BUN 7* 5*   CREATININE 0.64 0.55   LABGLOM >60 >60   GLUCOSE 105* 142*     PT/INR:   Recent Labs     22  0739   PROTIME 15.4*   INR 1.2     APTT:  Recent Labs     22  0739   APTT 30.9     LIVER PROFILE:  Recent Labs     22  1545   AST 26   ALT 9   LABALBU 3.1*     CT ABDOMEN PELVIS W IV CONTRAST Additional Contrast? None  Narrative: EXAMINATION:  CT OF THE ABDOMEN AND PELVIS WITH CONTRAST 2022 5:49 pm    TECHNIQUE:  CT of the abdomen and pelvis was performed with the administration of  intravenous contrast. Multiplanar reformatted images are provided for review. Automated exposure control, iterative reconstruction, and/or weight based  adjustment of the mA/kV was utilized to reduce the radiation dose to as low  as reasonably achievable. COMPARISON:  None. HISTORY:  ORDERING SYSTEM PROVIDED HISTORY: abd pain  TECHNOLOGIST PROVIDED HISTORY:  abd pain    Decision Support Exception - unselect if not a suspected or confirmed  emergency medical condition->Emergency Medical Condition (MA)  Reason for Exam: abd pain  Additional signs and symptoms: emisis x1 wk, e coli    FINDINGS:  Lower Chest: Small right pleural effusion. Organs: Multiple suspicious liver lesions are present throughout the liver  with confluent mass and capsular retraction measuring up to 5.5 cm involving  segment 4/5. The gallbladder is not discretely visualized and may be  directly involved. There also appears to be soft tissue extension abutting  the stomach and duodenum on axial image 63. Biliary dilatation is present with choledocholithiasis noted in the distal  common duct. The pancreas, spleen, adrenals and kidneys reveal no acute findings. GI/Bowel: There is no bowel dilatation or wall thickening identified. Pelvis: No acute findings. Peritoneum/Retroperitoneum: Soft tissue nodularity in the right abdomen is  noted, compatible with peritoneal implants. Trace ascites. No free air or  loculated fluid collection. Infrarenal aortic aneurysm measures up to 3.1 cm. Bones/Soft Tissues: No abnormality identified. Benign-appearing rim  calcification in the right breast.  Impression: 1. Findings compatible with metastatic disease in the liver. The  gallbladder is not discretely visualized separate from this locally  aggressive process, which may be directly involved or the primary source of  disease. Direct extension from the liver appears to involve the adjacent  stomach and duodenum.     2.  Findings compatible with peritoneal carcinomatosis with discrete  peritoneal implants. Trace abdominopelvic ascites. 3.  Choledocholithiasis with biliary dilatation. 4.  Infrarenal aortic aneurysm measuring up to 3.1 cm. Please see  recommendation below. RECOMMENDATIONS:  3.1 cm infrarenal abdominal aortic aneurysm. Recommend follow-up every 3  years. Reference: J Am Vish Radiol 4733;38:157-825. IMPRESSION:    Primary Problem  Hypokalemia    Active Hospital Problems    Diagnosis Date Noted    Choledocholithiasis [K80.50]      Priority: Medium    Liver mass [R16.0]      Priority: Medium    Anemia [D64.9]      Priority: Medium    Weight loss [R63.4]      Priority: Medium    Generalized abdominal pain [R10.84]      Priority: Medium    Nausea and vomiting [R11.2]      Priority: Medium    Thrombocytosis [D75.839]      Priority: Medium    Hypokalemia [E87.6] 06/22/2022     Priority: Medium    Diabetes mellitus type 2 in obese ([address] [E11.69, E66.9] 02/18/2019    Morbid obesity ([address] [E66.01] 02/18/2019       RECOMMENDATIONS:  1. Records and labs and images were reviewed and discussed the patient. 2. Images are consistent with intra-abdominal malignancy with probable liver metastasis. MRI was ordered but patient declined. Tumor markers showed very high CA 19-9 and elevated CEA and alpha-fetoprotein. 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 3. Explained all of the above to the patient. Still she is restricting MRI. 4. We will refer to IR for CT-guided needle biopsy. Further recommendations will be after establishing the diagnosis. 5. Patient's questions were answered to the best of her satisfaction and she verbalized full understanding and agreement. 6. Thank you for allowing us to participate in the care of this pleasant patient. Discussed with patient and Nurse.     Amanda Cannon MD, MD                            [facility]                            This note is created with the assistance of a speech recognition program.  While intending to generate a document that actually reflects the content of the visit, the document can still have some errors including those of syntax and sound a like substitutions which may escape proof reading. It such instances, actual meaning can be extrapolated by contextual diversion.     yrn

## 2022-06-24 NOTE — DISCHARGE SUMMARY
[facility] Internal Medicine    Discharge Summary     Patient ID: Sung Rivera  :  1950   MRN: [id number]     ACCOUNT:  [de-identified]   Patient's PCP: Ghassan Knowles MD  Admit Date: 2022   Discharge Date: 22  Length of Stay: 2  Code Status:  Full Code  Admitting Physician: Caden Barrientos MD  Discharge Physician: Caden Barrientos MD     Active Discharge Diagnoses:     Primary Problem  Hypokalemia      [place] Problems    Diagnosis Date Noted    Choledocholithiasis [K80.50]      Priority: Medium    Liver mass [R16.0]      Priority: Medium    Anemia [D64.9]      Priority: Medium    Weight loss [R63.4]      Priority: Medium    Generalized abdominal pain [R10.84]      Priority: Medium    Nausea and vomiting [R11.2]      Priority: Medium    Thrombocytosis [D75.839]      Priority: Medium    Hypokalemia [E87.6] 2022     Priority: Medium    Diabetes mellitus type 2 in obese ([facility] [address] [E11.69, E66.9] 2019    Morbid obesity ([facility] [address] [E66.01] 2019       Admission Condition:  fair     Discharged Condition: fair    Hospital Stay:     Hospital Course: Sung Rivera is a 81 y.o. female who was admitted for the management of Hypokalemia , presented to ER with Abdominal Pain and Emesis    62-year-old presenting with abdominal pain nausea vomiting for 1 week  History of COPD, CAD, type II DM, hypertension, morbid obesity, SAGAR  1. Metastatic liver disease-new diagnosis-CEA ordered, GI oncology consulted. CEA and CA 19-9 severely elevated, suspicion for pancreatic cancer  2. Severe hypokalemia 2.7, improved with replacement protocol   3. Known CAD- trop negative X2  4. Infrarenal AAA 3.1 cm- will need outpatient follow-up  5. Type 2 diabetes- sliding scale  6. COPD currently compensated  7.  Hypertension-resume home medication  Body mass index is 36.34 kg/m².     Detail discussion with patient-CODE STATUS discussed-she is unable to decide right now as such will remain full code for now  Patient is in a lot of emotional distress-palliative care consulted  Patient continues to refuse MRI in spite of detailed discussion    Patient was seen by oncology as well as gastroenterology plan is for outpatient biopsy after holding aspirin Plavix for 5 days in total- she would be eligible to have the biopsy on Monday 6/27, order was placed for this      Significant therapeutic interventions: As above    Significant Diagnostic Studies:   Labs / Micro:    Lab Results   Component Value Date    WBC 12.8 (H) 06/24/2022    HGB 11.7 (L) 06/24/2022    HCT 35.8 (L) 06/24/2022    MCV 89.8 06/24/2022     (H) 06/24/2022          Lab Results   Component Value Date     06/24/2022    K 4.1 06/24/2022    CL 96 06/24/2022    CO2 29 06/24/2022    BUN 5 06/24/2022    CREATININE 0.55 06/24/2022    GLUCOSE 142 06/24/2022    CALCIUM 8.2 06/24/2022          Radiology:    CT ABDOMEN PELVIS W IV CONTRAST Additional Contrast? None    Result Date: 6/22/2022  EXAMINATION: CT OF THE ABDOMEN AND PELVIS WITH CONTRAST 6/22/2022 5:49 pm TECHNIQUE: CT of the abdomen and pelvis was performed with the administration of intravenous contrast. Multiplanar reformatted images are provided for review. Automated exposure control, iterative reconstruction, and/or weight based adjustment of the mA/kV was utilized to reduce the radiation dose to as low as reasonably achievable. COMPARISON: None. HISTORY: ORDERING SYSTEM PROVIDED HISTORY: abd pain TECHNOLOGIST PROVIDED HISTORY: abd pain Decision Support Exception - unselect if not a suspected or confirmed emergency medical condition->Emergency Medical Condition (MA) Reason for Exam: abd pain Additional signs and symptoms: emisis x1 wk, e coli FINDINGS: Lower Chest: Small right pleural effusion.  Organs: Multiple suspicious liver lesions are present throughout the liver with confluent mass and capsular retraction measuring up to 5.5 cm involving segment 4/5. The gallbladder is not discretely visualized and may be directly involved. There also appears to be soft tissue extension abutting the stomach and duodenum on axial image 63. Biliary dilatation is present with choledocholithiasis noted in the distal common duct. The pancreas, spleen, adrenals and kidneys reveal no acute findings. GI/Bowel: There is no bowel dilatation or wall thickening identified. Pelvis: No acute findings. Peritoneum/Retroperitoneum: Soft tissue nodularity in the right abdomen is noted, compatible with peritoneal implants. Trace ascites. No free air or loculated fluid collection. Infrarenal aortic aneurysm measures up to 3.1 cm. Bones/Soft Tissues: No abnormality identified. Benign-appearing rim calcification in the right breast.     1.  Findings compatible with metastatic disease in the liver. The gallbladder is not discretely visualized separate from this locally aggressive process, which may be directly involved or the primary source of disease. Direct extension from the liver appears to involve the adjacent stomach and duodenum. 2.  Findings compatible with peritoneal carcinomatosis with discrete peritoneal implants. Trace abdominopelvic ascites. 3.  Choledocholithiasis with biliary dilatation. 4.  Infrarenal aortic aneurysm measuring up to 3.1 cm. Please see recommendation below. RECOMMENDATIONS: 3.1 cm infrarenal abdominal aortic aneurysm. Recommend follow-up every 3 years. Reference: J Am Vish Radiol 0179;92:036-385. Consultations:    Consults:     Final Specialist Recommendations/Findings:   IP CONSULT TO INTERNAL MEDICINE  IP CONSULT TO GI  CONSULT [facility] ONCOLOGY NURSE NAVIGATOR  IP CONSULT TO ONCOLOGY  IP CONSULT TO PALLIATIVE CARE      The patient was seen and examined on day of discharge and this discharge summary is in conjunction with any daily progress note from day of discharge.     Discharge plan:     Disposition: Home      Instructions to Patient: Keep follow-up appointments      Requiring Further Evaluation/Follow Up POST HOSPITALIZATION/Incidental Findings:     Physician Follow Up:     Bill Ray MD  [address] Heather Rojas  23     On 6/28/2022  @ 3:30 PM for hospital follow up    [facility]) IR  [phone]    call to schedule your liver biopsy    Rigoberto Avila MD  [address] [place] [address]  [phone]    Schedule an appointment as soon as possible for a visit in 1 week  You will need to follow up with him after the biopsy       Activity: Resume as directed    Discharge Medications:      Medication List      CONTINUE taking these medications    albuterol sulfate  (90 Base) MCG/ACT inhaler  Commonly known as: PROVENTIL;VENTOLIN;PROAIR  Inhale 2 puffs into the lungs every 6 hours as needed for Wheezing or Shortness of Breath     ARIPiprazole 5 MG tablet  Commonly known as: Abilify  Take 1 tablet by mouth daily     atorvastatin 80 MG tablet  Commonly known as: LIPITOR  Take 1 tablet by mouth daily     bisoprolol 5 MG tablet  Commonly known as: ZEBETA  take 1 tablet by mouth once daily     blood glucose test strips  Test 3 times a day & as needed for symptoms of irregular blood glucose. Dispense sufficient amount for indicated testing frequency plus additional to accommodate PRN testing needs.      budesonide-formoterol 160-4.5 MCG/ACT Aero  Commonly known as: Symbicort  Inhale 2 puffs into the lungs 2 times daily     fluticasone 50 MCG/ACT nasal spray  Commonly known as: Flonase  2 sprays by Nasal route daily     gabapentin 600 MG tablet  Commonly known as: NEURONTIN     glimepiride 2 MG tablet  Commonly known as: AMARYL  take 1 tablet by mouth twice a day before meals     glucose monitoring kit  1 kit by Does not apply route daily     Insulin Pen Needle 32G X 4 MM Misc  1 each by Does not apply route once a week     Lancets Misc  1 each by Does not apply route 3 times daily     lisinopril 10 MG tablet  Commonly known as: PRINIVIL;ZESTRIL  take 1 tablet by mouth once daily     Melatonin 5 MG Subl  Place 1 applicator under the tongue nightly as needed (insomnia)     omeprazole 40 MG delayed release capsule  Commonly known as: PRILOSEC  take 1 capsule by mouth every morning BEFORE BREAKFAST     oxybutynin 5 MG tablet  Commonly known as: DITROPAN  take 1 tablet by mouth twice a day     Ozempic (1 MG/DOSE) 2 MG/1.5ML Sopn  Generic drug: Semaglutide (1 MG/DOSE)  Inject 1 mg per week     ranolazine 500 MG extended release tablet  Commonly known as: RANEXA  Take 1 tablet by mouth 2 times daily     therapeutic multivitamin-minerals tablet  Take 1 tablet by mouth daily     tiotropium 2.5 MCG/ACT Aers inhaler  Commonly known as: Spiriva Respimat  Inhale 2 puffs into the lungs daily     traZODone 50 MG tablet  Commonly known as: DESYREL     * venlafaxine 75 MG extended release capsule  Commonly known as: Effexor XR  Take 1 capsule by mouth daily     * venlafaxine 150 MG extended release capsule  Commonly known as: EFFEXOR XR  Take 1 capsule by mouth daily Take with 75 mg capsule         * This list has 2 medication(s) that are the same as other medications prescribed for you. Read the directions carefully, and ask your doctor or other care provider to review them with you. STOP taking these medications    aspirin 81 MG tablet     clopidogrel 75 MG tablet  Commonly known as: PLAVIX            Time Spent on discharge is  35 mins in patient examination, evaluation, counseling as well as medication reconciliation, prescriptions for required medications, discharge plan and follow up. Electronically signed by   Maurisio Aparicio MD  6/24/2022  1:09 PM      Thank you Dr. Jaspreet Beth MD for the opportunity to be involved in this patient's care.
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
## 2022-06-24 NOTE — FLOWSHEET NOTE
06/24/22 1814   Encounter Summary   Encounter Overview/Reason  Attempted Encounter  (patient with nurse)

## 2022-06-24 NOTE — PROGRESS NOTES
Liver biopsy order received, patient was on plavix at home. Plavix will need to be held for 5 days. Dr. Contreras Juliana notified that biopsy will be Monday if patient still in hospital or can be scheduled as outpatient if patient discharged over weekend. Nurse updated. Continue to hold Aspirin and Plavix.

## 2022-06-27 ENCOUNTER — HOSPITAL ENCOUNTER (INPATIENT)
Age: 72
LOS: 8 days | Discharge: NURSING, REHAB OR LONG-TERM CARE | DRG: 435 | End: 2022-07-05
Attending: STUDENT IN AN ORGANIZED HEALTH CARE EDUCATION/TRAINING PROGRAM | Admitting: INTERNAL MEDICINE
Payer: MEDICARE

## 2022-06-27 ENCOUNTER — APPOINTMENT (OUTPATIENT)
Dept: GENERAL RADIOLOGY | Age: 72
DRG: 435 | End: 2022-06-27
Payer: MEDICARE

## 2022-06-27 ENCOUNTER — APPOINTMENT (OUTPATIENT)
Dept: CT IMAGING | Age: 72
DRG: 435 | End: 2022-06-27
Payer: MEDICARE

## 2022-06-27 ENCOUNTER — CARE COORDINATION (OUTPATIENT)
Dept: CASE MANAGEMENT | Age: 72
End: 2022-06-27

## 2022-06-27 DIAGNOSIS — I26.99 OTHER ACUTE PULMONARY EMBOLISM WITHOUT ACUTE COR PULMONALE (HCC): Primary | ICD-10-CM

## 2022-06-27 DIAGNOSIS — R16.0 LIVER MASS: ICD-10-CM

## 2022-06-27 LAB
ABSOLUTE EOS #: 0 K/UL (ref 0–0.4)
ABSOLUTE LYMPH #: 2.5 K/UL (ref 1–4.8)
ABSOLUTE MONO #: 1.6 K/UL (ref 0.1–1.3)
ALBUMIN SERPL-MCNC: 2.5 G/DL (ref 3.5–5.2)
ALP BLD-CCNC: 108 U/L (ref 35–104)
ALT SERPL-CCNC: 13 U/L (ref 5–33)
AMMONIA: 24 UMOL/L (ref 11–51)
ANION GAP SERPL CALCULATED.3IONS-SCNC: 11 MMOL/L (ref 9–17)
AST SERPL-CCNC: 29 U/L
BASOPHILS # BLD: 1 % (ref 0–2)
BASOPHILS ABSOLUTE: 0.1 K/UL (ref 0–0.2)
BILIRUB SERPL-MCNC: 0.43 MG/DL (ref 0.3–1.2)
BILIRUBIN DIRECT: 0.2 MG/DL
BILIRUBIN, INDIRECT: 0.23 MG/DL (ref 0–1)
BUN BLDV-MCNC: 7 MG/DL (ref 8–23)
CALCIUM SERPL-MCNC: 8.3 MG/DL (ref 8.6–10.4)
CHLORIDE BLD-SCNC: 95 MMOL/L (ref 98–107)
CO2: 26 MMOL/L (ref 20–31)
CREAT SERPL-MCNC: 0.62 MG/DL (ref 0.5–0.9)
D-DIMER QUANTITATIVE: 6.8 MG/L FEU (ref 0–0.59)
EOSINOPHILS RELATIVE PERCENT: 0 % (ref 0–4)
GFR AFRICAN AMERICAN: >60 ML/MIN
GFR NON-AFRICAN AMERICAN: >60 ML/MIN
GFR SERPL CREATININE-BSD FRML MDRD: ABNORMAL ML/MIN/{1.73_M2}
GLUCOSE BLD-MCNC: 98 MG/DL (ref 70–99)
HCT VFR BLD CALC: 34 % (ref 36–46)
HEMOGLOBIN: 11 G/DL (ref 12–16)
INR BLD: 1.2
LIPASE: 27 U/L (ref 13–60)
LYMPHOCYTES # BLD: 21 % (ref 24–44)
MAGNESIUM: 1.5 MG/DL (ref 1.6–2.6)
MCH RBC QN AUTO: 28.6 PG (ref 26–34)
MCHC RBC AUTO-ENTMCNC: 32.5 G/DL (ref 31–37)
MCV RBC AUTO: 87.9 FL (ref 80–100)
MONOCYTES # BLD: 14 % (ref 1–7)
PARTIAL THROMBOPLASTIN TIME: 36.8 SEC (ref 24–36)
PDW BLD-RTO: 14.2 % (ref 11.5–14.9)
PLATELET # BLD: 527 K/UL (ref 150–450)
PMV BLD AUTO: 7.4 FL (ref 6–12)
POTASSIUM SERPL-SCNC: 3.1 MMOL/L (ref 3.7–5.3)
PRO-BNP: 1834 PG/ML
PROTHROMBIN TIME: 15 SEC (ref 11.8–14.6)
RBC # BLD: 3.86 M/UL (ref 4–5.2)
SEG NEUTROPHILS: 64 % (ref 36–66)
SEGMENTED NEUTROPHILS ABSOLUTE COUNT: 7.5 K/UL (ref 1.3–9.1)
SODIUM BLD-SCNC: 132 MMOL/L (ref 135–144)
TOTAL PROTEIN: 6 G/DL (ref 6.4–8.3)
TROPONIN, HIGH SENSITIVITY: 13 NG/L (ref 0–14)
TROPONIN, HIGH SENSITIVITY: 14 NG/L (ref 0–14)
WBC # BLD: 11.7 K/UL (ref 3.5–11)

## 2022-06-27 PROCEDURE — 71260 CT THORAX DX C+: CPT

## 2022-06-27 PROCEDURE — 84484 ASSAY OF TROPONIN QUANT: CPT

## 2022-06-27 PROCEDURE — 85379 FIBRIN DEGRADATION QUANT: CPT

## 2022-06-27 PROCEDURE — 2060000000 HC ICU INTERMEDIATE R&B

## 2022-06-27 PROCEDURE — 6360000002 HC RX W HCPCS: Performed by: STUDENT IN AN ORGANIZED HEALTH CARE EDUCATION/TRAINING PROGRAM

## 2022-06-27 PROCEDURE — 96368 THER/DIAG CONCURRENT INF: CPT

## 2022-06-27 PROCEDURE — 83690 ASSAY OF LIPASE: CPT

## 2022-06-27 PROCEDURE — 99285 EMERGENCY DEPT VISIT HI MDM: CPT

## 2022-06-27 PROCEDURE — 6360000004 HC RX CONTRAST MEDICATION: Performed by: STUDENT IN AN ORGANIZED HEALTH CARE EDUCATION/TRAINING PROGRAM

## 2022-06-27 PROCEDURE — 96365 THER/PROPH/DIAG IV INF INIT: CPT

## 2022-06-27 PROCEDURE — 80076 HEPATIC FUNCTION PANEL: CPT

## 2022-06-27 PROCEDURE — 74177 CT ABD & PELVIS W/CONTRAST: CPT

## 2022-06-27 PROCEDURE — 36415 COLL VENOUS BLD VENIPUNCTURE: CPT

## 2022-06-27 PROCEDURE — 6370000000 HC RX 637 (ALT 250 FOR IP): Performed by: STUDENT IN AN ORGANIZED HEALTH CARE EDUCATION/TRAINING PROGRAM

## 2022-06-27 PROCEDURE — 80048 BASIC METABOLIC PNL TOTAL CA: CPT

## 2022-06-27 PROCEDURE — 96375 TX/PRO/DX INJ NEW DRUG ADDON: CPT

## 2022-06-27 PROCEDURE — 83880 ASSAY OF NATRIURETIC PEPTIDE: CPT

## 2022-06-27 PROCEDURE — 85730 THROMBOPLASTIN TIME PARTIAL: CPT

## 2022-06-27 PROCEDURE — 93005 ELECTROCARDIOGRAM TRACING: CPT | Performed by: STUDENT IN AN ORGANIZED HEALTH CARE EDUCATION/TRAINING PROGRAM

## 2022-06-27 PROCEDURE — 2500000003 HC RX 250 WO HCPCS: Performed by: STUDENT IN AN ORGANIZED HEALTH CARE EDUCATION/TRAINING PROGRAM

## 2022-06-27 PROCEDURE — 85025 COMPLETE CBC W/AUTO DIFF WBC: CPT

## 2022-06-27 PROCEDURE — 71045 X-RAY EXAM CHEST 1 VIEW: CPT

## 2022-06-27 PROCEDURE — 83735 ASSAY OF MAGNESIUM: CPT

## 2022-06-27 PROCEDURE — 82140 ASSAY OF AMMONIA: CPT

## 2022-06-27 PROCEDURE — 96361 HYDRATE IV INFUSION ADD-ON: CPT

## 2022-06-27 PROCEDURE — 2580000003 HC RX 258: Performed by: STUDENT IN AN ORGANIZED HEALTH CARE EDUCATION/TRAINING PROGRAM

## 2022-06-27 PROCEDURE — 85610 PROTHROMBIN TIME: CPT

## 2022-06-27 RX ORDER — MAGNESIUM SULFATE HEPTAHYDRATE 40 MG/ML
2000 INJECTION, SOLUTION INTRAVENOUS ONCE
Status: COMPLETED | OUTPATIENT
Start: 2022-06-27 | End: 2022-06-28

## 2022-06-27 RX ORDER — POTASSIUM CHLORIDE 7.45 MG/ML
10 INJECTION INTRAVENOUS ONCE
Status: COMPLETED | OUTPATIENT
Start: 2022-06-27 | End: 2022-06-27

## 2022-06-27 RX ORDER — HEPARIN SODIUM 1000 [USP'U]/ML
80 INJECTION, SOLUTION INTRAVENOUS; SUBCUTANEOUS ONCE
Status: COMPLETED | OUTPATIENT
Start: 2022-06-27 | End: 2022-06-27

## 2022-06-27 RX ORDER — HEPARIN SODIUM 1000 [USP'U]/ML
80 INJECTION, SOLUTION INTRAVENOUS; SUBCUTANEOUS PRN
Status: DISCONTINUED | OUTPATIENT
Start: 2022-06-27 | End: 2022-07-01 | Stop reason: ALTCHOICE

## 2022-06-27 RX ORDER — MORPHINE SULFATE 4 MG/ML
4 INJECTION, SOLUTION INTRAMUSCULAR; INTRAVENOUS ONCE
Status: COMPLETED | OUTPATIENT
Start: 2022-06-27 | End: 2022-06-27

## 2022-06-27 RX ORDER — SODIUM CHLORIDE 0.9 % (FLUSH) 0.9 %
10 SYRINGE (ML) INJECTION PRN
Status: DISCONTINUED | OUTPATIENT
Start: 2022-06-27 | End: 2022-07-05 | Stop reason: HOSPADM

## 2022-06-27 RX ORDER — 0.9 % SODIUM CHLORIDE 0.9 %
80 INTRAVENOUS SOLUTION INTRAVENOUS ONCE
Status: COMPLETED | OUTPATIENT
Start: 2022-06-27 | End: 2022-06-27

## 2022-06-27 RX ORDER — HEPARIN SODIUM 1000 [USP'U]/ML
40 INJECTION, SOLUTION INTRAVENOUS; SUBCUTANEOUS PRN
Status: DISCONTINUED | OUTPATIENT
Start: 2022-06-27 | End: 2022-07-01 | Stop reason: ALTCHOICE

## 2022-06-27 RX ADMIN — HEPARIN SODIUM 6750 UNITS: 1000 INJECTION INTRAVENOUS; SUBCUTANEOUS at 23:12

## 2022-06-27 RX ADMIN — HEPARIN SODIUM 18 UNITS/KG/HR: 10000 INJECTION, SOLUTION INTRAVENOUS; SUBCUTANEOUS at 23:16

## 2022-06-27 RX ADMIN — IOPAMIDOL 100 ML: 755 INJECTION, SOLUTION INTRAVENOUS at 21:18

## 2022-06-27 RX ADMIN — Medication 4 MG: at 20:11

## 2022-06-27 RX ADMIN — POTASSIUM BICARBONATE 40 MEQ: 782 TABLET, EFFERVESCENT ORAL at 22:17

## 2022-06-27 RX ADMIN — SODIUM CHLORIDE 80 ML: 9 INJECTION, SOLUTION INTRAVENOUS at 21:19

## 2022-06-27 RX ADMIN — POTASSIUM CHLORIDE 10 MEQ: 7.46 INJECTION, SOLUTION INTRAVENOUS at 22:17

## 2022-06-27 RX ADMIN — MAGNESIUM SULFATE HEPTAHYDRATE 2000 MG: 40 INJECTION, SOLUTION INTRAVENOUS at 22:15

## 2022-06-27 RX ADMIN — SODIUM CHLORIDE, PRESERVATIVE FREE 10 ML: 5 INJECTION INTRAVENOUS at 21:18

## 2022-06-27 ASSESSMENT — ENCOUNTER SYMPTOMS
NAUSEA: 0
EYE PAIN: 0
EYE ITCHING: 0
SINUS PRESSURE: 0
SHORTNESS OF BREATH: 1
ABDOMINAL PAIN: 1
SORE THROAT: 0
COUGH: 0
DIARRHEA: 0
SINUS PAIN: 0
ABDOMINAL DISTENTION: 0
CONSTIPATION: 0

## 2022-06-27 ASSESSMENT — PAIN SCALES - GENERAL
PAINLEVEL_OUTOF10: 8
PAINLEVEL_OUTOF10: 8

## 2022-06-27 ASSESSMENT — PAIN - FUNCTIONAL ASSESSMENT: PAIN_FUNCTIONAL_ASSESSMENT: 0-10

## 2022-06-27 NOTE — ED PROVIDER NOTES
[address] [facility] CARE  Emergency Department Encounter  EmergencyMedicine Resident     Pt Name:Karina Barker  MRN: [id number]  [place] 1950  Date of evaluation: 6/27/22  PCP:  Gilbert Lima MD    This patient was evaluated in the Emergency Department for symptoms described in the history of present illness. The patient was evaluated in the context of the global COVID-19 pandemic, which necessitated consideration that the patient might be at risk for infection with the SARS-CoV-2 virus that causes COVID-19. Institutional protocols and algorithms that pertain to the evaluation of patients at risk for COVID-19 are in a state of rapid change based on information released by regulatory bodies including the CDC and federal and state organizations. These policies and algorithms were followed during the patient's care in the ED. CHIEF COMPLAINT       Chief Complaint   Patient presents with    Fatigue    Abdominal Pain       HISTORY OF PRESENT ILLNESS  (Location/Symptom, Timing/Onset, Context/Setting, Quality, Duration, Modifying Factors, Severity.)      Katie Beverly is a 81 y.o. female who was recently admitted for abdominal pain found to have widely metastatic cancer likely primary pancreatic presenting for worsening abdominal pain, fatigue, lower extremity swelling worse on the left and shortness of breath with occasional right-sided pleuritic chest pain. States that her pain has been progressive since discharge. She reports that she was instructed not to take her aspirin Plavix at home so that she can be eligible for liver biopsy tomorrow. Reports that she accidentally took her aspirin and Plavix 3 days ago. She is scheduled for follow up with her doctor tomorrow but felt she couldn't make it to her appointment due to uncontrolled pain and she does not have enough help at home to care for herself.     PAST MEDICAL / SURGICAL / SOCIAL / FAMILY HISTORY      has a past medical history of Acid reflux, Arthritis, Cataracts, bilateral, CHF (congestive heart failure) ([facility] [address] COPD (chronic obstructive pulmonary disease) ([facility] Gall stones, Head injury, Hyperlipidemia, Hypertension, Insomnia, MI (myocardial infarction) ([facility] [address] Neuropathy, Sleep apnea, and Type 2 diabetes mellitus without complication ([facility] has a past surgical history that includes Femur Surgery; Foot surgery (Left); Mandible fracture surgery; Cardiac surgery (); Ankle surgery (Left); Tonsillectomy; and Coronary angioplasty with stent (). Social History     Socioeconomic History    Marital status: Single     Spouse name: Not on file    Number of children: Not on file    Years of education: Not on file    Highest education level: Not on file   Occupational History    Occupation: disability    Tobacco Use    Smoking status: Former Smoker     Packs/day: 1.00     Years: 40.00     Pack years: 40.00     Types: Cigarettes     Quit date:      Years since quittin.5    Smokeless tobacco: Never Used    Tobacco comment: 2 packs per week since May, previously quit x 20 years    Vaping Use    Vaping Use: Never used   Substance and Sexual Activity    Alcohol use: Yes     Alcohol/week: 1.0 standard drink     Types: 1 Cans of beer per week    Drug use: Yes     Types: Marijuana Mckenzie Jaden)     Comment: \"She said she did everything in the 's\"     Sexual activity: Not on file   Other Topics Concern    Not on file   Social History Narrative    Not on file     Social Determinants of Health     Financial Resource Strain:     Difficulty of Paying Living Expenses: Not on file   Food Insecurity:     Worried About Running Out of Food in the Last Year: Not on file    Isabel of Food in the Last Year: Not on file   Transportation Needs:     Lack of Transportation (Medical): Not on file    Lack of Transportation (Non-Medical):  Not on file   Physical Activity:     Days of Exercise per Week: Not on file    Minutes of Exercise per Session: Not on file   Stress:     Feeling of Stress : Not on file   Social Connections:     Frequency of Communication with Friends and Family: Not on file    Frequency of Social Gatherings with Friends and Family: Not on file    Attends Faith Services: Not on file    Active Member of [address] [place] [address] or Organizations: Not on file    Attends Club or Organization Meetings: Not on file    Marital Status: Not on file   Intimate Partner Violence:     Fear of Current or Ex-Partner: Not on file    Emotionally Abused: Not on file    Physically Abused: Not on file    Sexually Abused: Not on file   Housing Stability:     Unable to Pay for Housing in the Last Year: Not on file    Number of [place] in the Last Year: Not on file    Unstable Housing in the Last Year: Not on file       Family History   Problem Relation Age of Onset    Stroke Mother         COD     Other Father         pericardial infection        Allergies:  Chantix [varenicline tartrate] and Sulphadimidine [sulfamethazine]    Home Medications:  Prior to Admission medications    Medication Sig Start Date End Date Taking? Authorizing Provider   glucose monitoring (FREESTYLE FREEDOM) kit 1 kit by Does not apply route daily as needed (diabetic) 6/24/22   Allyson Juarez MD   FreeStyle Lancets MISC 1 each by Does not apply route daily 6/24/22   Allyson Juarez MD   Blood Glucose Monitoring Suppl (FREESTYLE LITE) STACY 1 Device by Does not apply route daily as needed (diabetic) 6/24/22   Allyson Juarez MD   Blood Glucose Monitoring Suppl (FREESTYLE FREEDOM LITE) w/Device KIT 1 kit by Does not apply route daily 6/24/22   Allyson Juarez MD   Insulin Syringe-Needle U-100 30G X 5/16\" 0.5 ML MISC 1 each by Does not apply route daily 6/24/22   Allyson Juarez MD   oxyCODONE HCl 5 MG TABA Take 5 mg by mouth 4 times daily as needed (moderate to severe pain) for up to 7 days.  6/24/22 7/1/22  Allyson Juarez MD   gabapentin (NEURONTIN) 600 MG tablet Take 600 mg by mouth capsule take 1 capsule by mouth every morning BEFORE BREAKFAST 9/30/20   Ny Sea, APRN - CNP   venlafaxine (EFFEXOR XR) 75 MG extended release capsule Take 1 capsule by mouth daily 9/30/20   Ny Sea, APRN - CNP   glimepiride (AMARYL) 2 MG tablet take 1 tablet by mouth twice a day before meals 9/30/20   Ny Sea, APRN - CNP   Semaglutide, 1 MG/DOSE, (OZEMPIC, 1 MG/DOSE,) 2 MG/1.5ML SOPN Inject 1 mg per week 9/30/20   Ny Sea, APRN - CNP   Multiple Vitamins-Minerals (THERAPEUTIC MULTIVITAMIN-MINERALS) tablet Take 1 tablet by mouth daily 9/30/20   Ny Sea, APRN - CNP   Melatonin 5 MG SUBL Place 1 applicator under the tongue nightly as needed (insomnia) 9/30/20   Ny Sea, BENNETT - CNP   Insulin Pen Needle 32G X 4 MM MISC 1 each by Does not apply route once a week 3/12/20   Jyoti Lujan MD   fluticasone [facility]) 50 MCG/ACT nasal spray 2 sprays by Nasal route daily 9/13/19   Jyoti Lujan MD       REVIEW OF SYSTEMS    (2-9 systems for level 4, 10 or more for level 5)      Review of Systems   Constitutional: Positive for fatigue. Negative for activity change, chills and fever. HENT: Negative for congestion, sinus pressure, sinus pain and sore throat. Eyes: Negative for pain and itching. Respiratory: Positive for shortness of breath. Negative for cough. Cardiovascular: Positive for chest pain. Gastrointestinal: Positive for abdominal pain. Negative for abdominal distention, constipation, diarrhea and nausea. Endocrine: Negative for polyuria. Genitourinary: Negative for dysuria and frequency. Musculoskeletal: Negative for arthralgias. Skin: Negative for rash. Neurological: Negative for light-headedness and headaches. PHYSICAL EXAM   (up to 7 for level 4, 8 or more for level 5)      INITIAL VITALS:   BP (!) 107/53   Pulse 63   Temp 98.1 °F (36.7 °C) (Oral)   Resp 16   SpO2 97%     Physical Exam  Vitals reviewed.    Constitutional:       General: She is Notify physician    Up as tolerated    Daily weights    Intake and output    Telemetry monitoring - continuous duration    Full Code    Inpatient consult to Spiritual Services    Initiate Oxygen Therapy Protocol    Pulse Oximetry Spot Check    EKG 12 Lead    Insert peripheral IV    ADMIT TO INPATIENT       MEDICATIONS ORDERED:  Orders Placed This Encounter   Medications    morphine sulfate (PF) injection 4 mg    0.9 % sodium chloride bolus    sodium chloride flush 0.9 % injection 10 mL    iopamidol (ISOVUE-370) 76 % injection 100 mL    magnesium sulfate 2000 mg in water 50 mL IVPB    potassium bicarb-citric acid (EFFER-K) effervescent tablet 40 mEq    potassium chloride 10 mEq/100 mL IVPB (Peripheral Line)    heparin (porcine) injection 6,750 Units    heparin (porcine) injection 6,750 Units    heparin (porcine) injection 3,380 Units    heparin (porcine) 25,000 Units in dextrose 5 % 250 mL infusion    sodium chloride flush 0.9 % injection 5-40 mL    sodium chloride flush 0.9 % injection 10 mL    0.9 % sodium chloride infusion    DISCONTD: ondansetron (ZOFRAN-ODT) disintegrating tablet 4 mg    DISCONTD: ondansetron (ZOFRAN) injection 4 mg    magnesium hydroxide (MILK OF MAGNESIA) 400 MG/5ML suspension 30 mL    OR Linked Order Group     acetaminophen (TYLENOL) tablet 650 mg     acetaminophen (TYLENOL) suppository 650 mg    0.9 % sodium chloride infusion    morphine sulfate (PF) injection 4 mg    albuterol sulfate HFA (PROVENTIL;VENTOLIN;PROAIR) 108 (90 Base) MCG/ACT inhaler 2 puff     Order Specific Question:   Initiate RT Bronchodilator Protocol     Answer:    Yes    ARIPiprazole (ABILIFY) tablet 5 mg    atorvastatin (LIPITOR) tablet 80 mg    metoprolol succinate (TOPROL XL) extended release tablet 50 mg    budesonide-formoterol (SYMBICORT) 160-4.5 MCG/ACT inhaler 2 puff    fluticasone (FLONASE) 50 MCG/ACT nasal spray 2 spray    gabapentin (NEURONTIN) tablet 600 mg    lisinopril (PRINIVIL;ZESTRIL) tablet 10 mg    melatonin tablet 3 mg    therapeutic multivitamin-minerals 1 tablet    pantoprazole (PROTONIX) tablet 40 mg    oxybutynin (DITROPAN) tablet 5 mg    oxyCODONE (ROXICODONE) immediate release tablet 5 mg    ranolazine (RANEXA) extended release tablet 500 mg    tiotropium (SPIRIVA RESPIMAT) 2.5 MCG/ACT inhaler 2 puff    traZODone (DESYREL) tablet 50 mg    venlafaxine (EFFEXOR XR) extended release capsule 150 mg    venlafaxine (EFFEXOR XR) extended release capsule 75 mg       DIAGNOSTIC RESULTS / EMERGENCY DEPARTMENT COURSE / MDM   LAB RESULTS:  Results for orders placed or performed during the hospital encounter of 25/31/61   Basic Metabolic Panel   Result Value Ref Range    Glucose 98 70 - 99 mg/dL    BUN 7 (L) 8 - 23 mg/dL    CREATININE 0.62 0.50 - 0.90 mg/dL    Calcium 8.3 (L) 8.6 - 10.4 mg/dL    Sodium 132 (L) 135 - 144 mmol/L    Potassium 3.1 (L) 3.7 - 5.3 mmol/L    Chloride 95 (L) 98 - 107 mmol/L    CO2 26 20 - 31 mmol/L    Anion Gap 11 9 - 17 mmol/L    GFR Non-African American >60 >60 mL/min    GFR African American >60 >60 mL/min    GFR Comment         Brain Natriuretic Peptide   Result Value Ref Range    Pro-BNP 1,834 (H) <300 pg/mL   CBC with Auto Differential   Result Value Ref Range    WBC 11.7 (H) 3.5 - 11.0 k/uL    RBC 3.86 (L) 4.0 - 5.2 m/uL    Hemoglobin 11.0 (L) 12.0 - 16.0 g/dL    Hematocrit 34.0 (L) 36 - 46 %    MCV 87.9 80 - 100 fL    MCH 28.6 26 - 34 pg    MCHC 32.5 31 - 37 g/dL    RDW 14.2 11.5 - 14.9 %    Platelets 375 (H) 072 - 450 k/uL    MPV 7.4 6.0 - 12.0 fL    Seg Neutrophils 64 36 - 66 %    Lymphocytes 21 (L) 24 - 44 %    Monocytes 14 (H) 1 - 7 %    Eosinophils % 0 0 - 4 %    Basophils 1 0 - 2 %    Segs Absolute 7.50 1.3 - 9.1 k/uL    Absolute Lymph # 2.50 1.0 - 4.8 k/uL    Absolute Mono # 1.60 (H) 0.1 - 1.3 k/uL    Absolute Eos # 0.00 0.0 - 0.4 k/uL    Basophils Absolute 0.10 0.0 - 0.2 k/uL   D-Dimer, Quantitative   Result Value Ref Range D-Dimer, Quant 6.80 (H) 0.00 - 0.59 mg/L FEU   Magnesium   Result Value Ref Range    Magnesium 1.5 (L) 1.6 - 2.6 mg/dL   Troponin   Result Value Ref Range    Troponin, High Sensitivity 14 0 - 14 ng/L   Troponin   Result Value Ref Range    Troponin, High Sensitivity 13 0 - 14 ng/L   Ammonia   Result Value Ref Range    Ammonia 24 11 - 51 umol/L   Lipase   Result Value Ref Range    Lipase 27 13 - 60 U/L   Hepatic Function Panel   Result Value Ref Range    Albumin 2.5 (L) 3.5 - 5.2 g/dL    Alkaline Phosphatase 108 (H) 35 - 104 U/L    ALT 13 5 - 33 U/L    AST 29 <32 U/L    Total Bilirubin 0.43 0.3 - 1.2 mg/dL    Bilirubin, Direct 0.20 <0.31 mg/dL    Bilirubin, Indirect 0.23 0.00 - 1.00 mg/dL    Total Protein 6.0 (L) 6.4 - 8.3 g/dL   Protime-INR   Result Value Ref Range    Protime 15.0 (H) 11.8 - 14.6 sec    INR 1.2    APTT   Result Value Ref Range    PTT 36.8 (H) 24.0 - 36.0 sec   EKG 12 Lead   Result Value Ref Range    Ventricular Rate 62 BPM    Atrial Rate 62 BPM    P-R Interval 104 ms    QRS Duration 116 ms    Q-T Interval 504 ms    QTc Calculation (Bazett) 511 ms    R Axis 143 degrees    T Axis 37 degrees       IMPRESSION: Eben Hendrickson is a 81 y.o. woman w/recently diagnosed metastatic ca presenting for leg swelling, fatigue and worsening uncontrolled pain. Due to known malignancy w/relative immobility, concern for hypercoagulable state, DVT and PE. She also has a new right axis on EKG. In addition will ensure no acute process in the abdomen on imaging and offer analgesia and reassess. RADIOLOGY:  CT ABDOMEN PELVIS W IV CONTRAST Additional Contrast? None    Result Date: 6/22/2022  1. Findings compatible with metastatic disease in the liver. The gallbladder is not discretely visualized separate from this locally aggressive process, which may be directly involved or the primary source of disease. Direct extension from the liver appears to involve the adjacent stomach and duodenum.  2.  Findings compatible with peritoneal carcinomatosis with discrete peritoneal implants. Trace abdominopelvic ascites. 3.  Choledocholithiasis with biliary dilatation. 4.  Infrarenal aortic aneurysm measuring up to 3.1 cm. Please see recommendation below. RECOMMENDATIONS: 3.1 cm infrarenal abdominal aortic aneurysm. Recommend follow-up every 3 years. Reference: J Am Vish Radiol 9048;16:798-542. XR CHEST PORTABLE    Result Date: 6/27/2022  Mild bibasal fibrosis and small right pleural effusion. EKG  Normal rate, sinus, intervals unchanged from prior EKG w/normal KS interval and QRS duration but prolonged QTc. She has a new right axis. T wave flattening in III. All EKG's are interpreted by the Emergency Department Physician who either signs or Co-signs this chart in the absence of a cardiologist.    EMERGENCY DEPARTMENT COURSE:  ED Course as of 06/28/22 0100   Mon Jun 27, 2022 2158 WBC(!): 11.7 [LR]   2158 Hemoglobin Quant(!): 11.0 [LR]   2158 Magnesium(!): 1.5 [LR]   2158 Potassium(!): 3.1  Mag and K being repelenished [LR]   2158 D-Dimer, Quant(!): 6.80  CT PE and CT A/P completed and pending [LR]   2159 Bilirubin, Direct: 0.20 [LR]   2973 Discussed w/radiology re PE study, small pulmonary emboli in right upper and middle lobe. Small clot burden. Main pulmonary arteries clear. Nothing acute in the abdomen [LR]      ED Course User Index  [LR] Elif Nis, DO    heparin gtt started and lower extremity dopplers ordered for tomorrow. Discussed w/medical team and admitted for acute pulmonary embolism. No notes of EC Admission Criteria type on file. PROCEDURES:  none    CONSULTS:  IP CONSULT TO SPIRITUAL SERVICES    CRITICAL CARE:  See attending note    FINAL IMPRESSION      1. Other acute pulmonary embolism without acute cor pulmonale ([facility] [address]          DISPOSITION / PLAN     DISPOSITION Admitted 06/27/2022 11:06:05 PM      PATIENT REFERRED TO:  No follow-up provider specified.     DISCHARGE MEDICATIONS:  Current Discharge Medication List          Colonel Ivonne DO  Emergency Medicine Resident    (Please note that portions of thisnote were completed with a voice recognition program.  Efforts were made to edit the dictations but occasionally words are mis-transcribed.)       Colonel Ivonne DO  Resident  06/28/22 0108

## 2022-06-27 NOTE — CARE COORDINATION
[facility] Transitions Initial Follow Up Call    Call within 2 business days of discharge: Yes    Patient: Valencia Ferreira Patient : 1950    MRN: [id number]  Reason for Admission: Hypokalemia    Discharge Date: 22 RARS: Readmission Risk Score: 12.8 ( )      Last Discharge [facility]       Complaint Diagnosis Description Type Department Provider    22 Abdominal Pain; Emesis Hypokalemia . .. ED to Hosp-Admission (Discharged) (ADMITTED) Jennifer Stahl MD; Loren Blair. .. Spoke with Simón, introduced to the role of CTN. she states she is having a rough day legs are swollen and they hurt to move. States she has already called and left message at PCP office denies warmth to are just sore to touch and when she moves them she feels clammy. CTN strongly advised elevating legs until PCP calls back  Sofiya chest pain has some SOB, dizziness, nausea, fatigue denies fever chills. States she has not had a bowel movement in 1 week abdomen tender and she is passing gas not eating but taking in fluids. Has not picket up her diabetic supplies states she was unaware they were at [employer]. Did not want to discuss medications with CTN . She has f/u with PCP tomorrow and has no concerns besides legs. CTN advised if the discomfort intensifies she needs to seek medical attention CTN told her she would f/u with her in 2 days after her PCP appointment at this time Patient got mad and stated you are not going to help me and come see my legs today. CTN again let patient know I am a transition nurse from [facility] and we do not come to home. She got angry stating you are not [facility]? Well I am hangin up now since you do not want help me and hung up. Transitions of Care Initial Call    Was this an external facility discharge?  No   Challenges to be reviewed by the provider   Additional needs identified to be addressed with provider: No  none             Method of communication with provider : none    Advance Care Planning:   Does patient have an Advance Directive: reviewed and current, reviewed and needs to be updated, not on file; education provided, patient declined education, decision maker updated and referral to internal ACP facilitator. LPN Care Coordinator contacted the patient by telephone to perform post hospital discharge assessment. Verified name and  with patient as identifiers. Provided introduction to self, and explanation of the LPN CC role. LPN CC reviewed discharge instructions, medical action plan and red flags with patient who verbalized understanding. Patient given an opportunity to ask questions and does not have any further questions or concerns at this time. Were discharge instructions available to patient? Yes. Reviewed appropriate site of care based on symptoms and resources available to patient including: PCP  Specialist  Benefits related nurse triage line  Urgent care clinics  When to call 911. The patient agrees to contact the PCP office for questions related to their healthcare. Medication reconciliation was performed with patient, who verbalizes understanding of administration of home medications. Advised obtaining a 90-day supply of all daily and as-needed medications. Was patient discharged with a pulse oximeter? no    LPN CC provided contact information. Plan for follow-up call in 1-2 days based on severity of symptoms and risk factors.   Plan for next call: symptom management-leg selling appetite , bowel movement        Facility: [facility]    Non-face-to-face services provided:  Obtained and reviewed discharge summary and/or continuity of care documents    Care Transitions 24 Hour Call    Schedule Follow Up Appointment with PCP: Elis Westfall you have a copy of your discharge instructions?: Yes  Do you have all of your prescriptions and are they filled?: No  Have you been contacted by a [facility] Pharmacist?: Yes  Have you scheduled your follow up appointment?: Yes  How are you going to get to your appointment?: Car - drive self  Do you feel like you have everything you need to keep you well at home?: Yes  Care Transitions Interventions         Follow Up  No future appointments.     Juli Perez LPN

## 2022-06-27 NOTE — ED TRIAGE NOTES
Mode of arrival (squad #, walk in, police, etc) : walk in        Chief complaint(s): Leg weakness, ABD pain        Arrival Note (brief scenario, treatment PTA, etc). : pt states her legs have been feeling increasingly weak and she is having continued abd pain with NV.         C= \"Have you ever felt that you should Cut down on your drinking? \"  No  A= \"Have people Annoyed you by criticizing your drinking? \"  No  G= \"Have you ever felt bad or Guilty about your drinking? \"  No  E= \"Have you ever had a drink as an Eye-opener first thing in the morning to steady your nerves or to help a hangover? \"  No      Deferred []      Reason for deferring: N/A    *If yes to two or more: probable alcohol abuse. *

## 2022-06-28 ENCOUNTER — APPOINTMENT (OUTPATIENT)
Dept: VASCULAR LAB | Age: 72
DRG: 435 | End: 2022-06-28
Payer: MEDICARE

## 2022-06-28 ENCOUNTER — TELEPHONE (OUTPATIENT)
Dept: ONCOLOGY | Age: 72
End: 2022-06-28

## 2022-06-28 LAB
ANION GAP SERPL CALCULATED.3IONS-SCNC: 10 MMOL/L (ref 9–17)
ANTI-XA UNFRAC HEPARIN: 0.47 IU/L (ref 0.3–0.7)
ANTI-XA UNFRAC HEPARIN: 0.55 IU/L (ref 0.3–0.7)
ANTI-XA UNFRAC HEPARIN: 0.71 IU/L (ref 0.3–0.7)
BUN BLDV-MCNC: 6 MG/DL (ref 8–23)
CALCIUM SERPL-MCNC: 8 MG/DL (ref 8.6–10.4)
CHLORIDE BLD-SCNC: 94 MMOL/L (ref 98–107)
CO2: 28 MMOL/L (ref 20–31)
CREAT SERPL-MCNC: 0.56 MG/DL (ref 0.5–0.9)
EKG ATRIAL RATE: 62 BPM
EKG ATRIAL RATE: 68 BPM
EKG P AXIS: 77 DEGREES
EKG P-R INTERVAL: 104 MS
EKG P-R INTERVAL: 142 MS
EKG Q-T INTERVAL: 474 MS
EKG Q-T INTERVAL: 504 MS
EKG QRS DURATION: 116 MS
EKG QRS DURATION: 116 MS
EKG QTC CALCULATION (BAZETT): 504 MS
EKG QTC CALCULATION (BAZETT): 511 MS
EKG R AXIS: 143 DEGREES
EKG R AXIS: 64 DEGREES
EKG T AXIS: 146 DEGREES
EKG T AXIS: 37 DEGREES
EKG VENTRICULAR RATE: 62 BPM
EKG VENTRICULAR RATE: 68 BPM
GFR AFRICAN AMERICAN: >60 ML/MIN
GFR NON-AFRICAN AMERICAN: >60 ML/MIN
GFR SERPL CREATININE-BSD FRML MDRD: ABNORMAL ML/MIN/{1.73_M2}
GLUCOSE BLD-MCNC: 119 MG/DL (ref 65–105)
GLUCOSE BLD-MCNC: 130 MG/DL (ref 65–105)
GLUCOSE BLD-MCNC: 132 MG/DL (ref 70–99)
GLUCOSE BLD-MCNC: 133 MG/DL (ref 65–105)
GLUCOSE BLD-MCNC: 151 MG/DL (ref 65–105)
GLUCOSE BLD-MCNC: 91 MG/DL (ref 65–105)
MAGNESIUM: 1.9 MG/DL (ref 1.6–2.6)
POTASSIUM SERPL-SCNC: 3.3 MMOL/L (ref 3.7–5.3)
PREALBUMIN: 4.7 MG/DL (ref 20–40)
SODIUM BLD-SCNC: 132 MMOL/L (ref 135–144)

## 2022-06-28 PROCEDURE — 83735 ASSAY OF MAGNESIUM: CPT

## 2022-06-28 PROCEDURE — 93010 ELECTROCARDIOGRAM REPORT: CPT | Performed by: INTERNAL MEDICINE

## 2022-06-28 PROCEDURE — 6370000000 HC RX 637 (ALT 250 FOR IP): Performed by: INTERNAL MEDICINE

## 2022-06-28 PROCEDURE — 6360000002 HC RX W HCPCS: Performed by: NURSE PRACTITIONER

## 2022-06-28 PROCEDURE — 93970 EXTREMITY STUDY: CPT

## 2022-06-28 PROCEDURE — 94640 AIRWAY INHALATION TREATMENT: CPT

## 2022-06-28 PROCEDURE — 6370000000 HC RX 637 (ALT 250 FOR IP): Performed by: NURSE PRACTITIONER

## 2022-06-28 PROCEDURE — 93005 ELECTROCARDIOGRAM TRACING: CPT | Performed by: NURSE PRACTITIONER

## 2022-06-28 PROCEDURE — 99223 1ST HOSP IP/OBS HIGH 75: CPT | Performed by: INTERNAL MEDICINE

## 2022-06-28 PROCEDURE — 36415 COLL VENOUS BLD VENIPUNCTURE: CPT

## 2022-06-28 PROCEDURE — 82947 ASSAY GLUCOSE BLOOD QUANT: CPT

## 2022-06-28 PROCEDURE — 85520 HEPARIN ASSAY: CPT

## 2022-06-28 PROCEDURE — 84134 ASSAY OF PREALBUMIN: CPT

## 2022-06-28 PROCEDURE — 94761 N-INVAS EAR/PLS OXIMETRY MLT: CPT

## 2022-06-28 PROCEDURE — 99222 1ST HOSP IP/OBS MODERATE 55: CPT | Performed by: INTERNAL MEDICINE

## 2022-06-28 PROCEDURE — 2580000003 HC RX 258: Performed by: NURSE PRACTITIONER

## 2022-06-28 PROCEDURE — 80048 BASIC METABOLIC PNL TOTAL CA: CPT

## 2022-06-28 PROCEDURE — 2060000000 HC ICU INTERMEDIATE R&B

## 2022-06-28 RX ORDER — SODIUM CHLORIDE 9 MG/ML
INJECTION, SOLUTION INTRAVENOUS PRN
Status: DISCONTINUED | OUTPATIENT
Start: 2022-06-28 | End: 2022-07-05 | Stop reason: HOSPADM

## 2022-06-28 RX ORDER — BUDESONIDE AND FORMOTEROL FUMARATE DIHYDRATE 160; 4.5 UG/1; UG/1
2 AEROSOL RESPIRATORY (INHALATION) 2 TIMES DAILY
Status: DISCONTINUED | OUTPATIENT
Start: 2022-06-28 | End: 2022-07-05 | Stop reason: HOSPADM

## 2022-06-28 RX ORDER — GABAPENTIN 600 MG/1
600 TABLET ORAL 3 TIMES DAILY
Status: DISCONTINUED | OUTPATIENT
Start: 2022-06-28 | End: 2022-07-03

## 2022-06-28 RX ORDER — ACETAMINOPHEN 325 MG/1
650 TABLET ORAL EVERY 6 HOURS PRN
Status: DISCONTINUED | OUTPATIENT
Start: 2022-06-28 | End: 2022-07-05 | Stop reason: HOSPADM

## 2022-06-28 RX ORDER — METOPROLOL SUCCINATE 50 MG/1
50 TABLET, EXTENDED RELEASE ORAL DAILY
Status: DISCONTINUED | OUTPATIENT
Start: 2022-06-28 | End: 2022-07-05 | Stop reason: HOSPADM

## 2022-06-28 RX ORDER — MORPHINE SULFATE 4 MG/ML
4 INJECTION, SOLUTION INTRAMUSCULAR; INTRAVENOUS ONCE
Status: COMPLETED | OUTPATIENT
Start: 2022-06-28 | End: 2022-06-28

## 2022-06-28 RX ORDER — ATORVASTATIN CALCIUM 80 MG/1
80 TABLET, FILM COATED ORAL DAILY
Status: DISCONTINUED | OUTPATIENT
Start: 2022-06-28 | End: 2022-07-05 | Stop reason: HOSPADM

## 2022-06-28 RX ORDER — ACETAMINOPHEN 650 MG/1
650 SUPPOSITORY RECTAL EVERY 6 HOURS PRN
Status: DISCONTINUED | OUTPATIENT
Start: 2022-06-28 | End: 2022-07-05 | Stop reason: HOSPADM

## 2022-06-28 RX ORDER — LANOLIN ALCOHOL/MO/W.PET/CERES
3 CREAM (GRAM) TOPICAL NIGHTLY PRN
Status: DISCONTINUED | OUTPATIENT
Start: 2022-06-28 | End: 2022-06-30

## 2022-06-28 RX ORDER — PANTOPRAZOLE SODIUM 40 MG/1
40 TABLET, DELAYED RELEASE ORAL
Status: DISCONTINUED | OUTPATIENT
Start: 2022-06-28 | End: 2022-07-05 | Stop reason: HOSPADM

## 2022-06-28 RX ORDER — RANOLAZINE 500 MG/1
500 TABLET, EXTENDED RELEASE ORAL 2 TIMES DAILY
Status: DISCONTINUED | OUTPATIENT
Start: 2022-06-28 | End: 2022-07-05 | Stop reason: HOSPADM

## 2022-06-28 RX ORDER — SODIUM CHLORIDE 0.9 % (FLUSH) 0.9 %
5-40 SYRINGE (ML) INJECTION EVERY 12 HOURS SCHEDULED
Status: DISCONTINUED | OUTPATIENT
Start: 2022-06-28 | End: 2022-07-05 | Stop reason: HOSPADM

## 2022-06-28 RX ORDER — TRAZODONE HYDROCHLORIDE 50 MG/1
50 TABLET ORAL NIGHTLY PRN
Status: DISCONTINUED | OUTPATIENT
Start: 2022-06-28 | End: 2022-07-05 | Stop reason: HOSPADM

## 2022-06-28 RX ORDER — LISINOPRIL 10 MG/1
10 TABLET ORAL DAILY
Status: DISCONTINUED | OUTPATIENT
Start: 2022-06-28 | End: 2022-07-02

## 2022-06-28 RX ORDER — M-VIT,TX,IRON,MINS/CALC/FOLIC 27MG-0.4MG
1 TABLET ORAL DAILY
Status: DISCONTINUED | OUTPATIENT
Start: 2022-06-28 | End: 2022-07-05 | Stop reason: HOSPADM

## 2022-06-28 RX ORDER — FLUTICASONE PROPIONATE 50 MCG
2 SPRAY, SUSPENSION (ML) NASAL DAILY
Status: DISCONTINUED | OUTPATIENT
Start: 2022-06-28 | End: 2022-07-05 | Stop reason: HOSPADM

## 2022-06-28 RX ORDER — ALBUTEROL SULFATE 90 UG/1
2 AEROSOL, METERED RESPIRATORY (INHALATION) EVERY 6 HOURS PRN
Status: DISCONTINUED | OUTPATIENT
Start: 2022-06-28 | End: 2022-07-05 | Stop reason: HOSPADM

## 2022-06-28 RX ORDER — VENLAFAXINE HYDROCHLORIDE 150 MG/1
150 CAPSULE, EXTENDED RELEASE ORAL DAILY
Status: DISCONTINUED | OUTPATIENT
Start: 2022-06-28 | End: 2022-07-05 | Stop reason: HOSPADM

## 2022-06-28 RX ORDER — SODIUM CHLORIDE 0.9 % (FLUSH) 0.9 %
10 SYRINGE (ML) INJECTION PRN
Status: DISCONTINUED | OUTPATIENT
Start: 2022-06-28 | End: 2022-07-05 | Stop reason: HOSPADM

## 2022-06-28 RX ORDER — SODIUM CHLORIDE 9 MG/ML
INJECTION, SOLUTION INTRAVENOUS CONTINUOUS
Status: DISCONTINUED | OUTPATIENT
Start: 2022-06-28 | End: 2022-06-30

## 2022-06-28 RX ORDER — OXYCODONE HYDROCHLORIDE 5 MG/1
5 TABLET ORAL 4 TIMES DAILY PRN
Status: DISCONTINUED | OUTPATIENT
Start: 2022-06-28 | End: 2022-07-05 | Stop reason: HOSPADM

## 2022-06-28 RX ORDER — ONDANSETRON 2 MG/ML
4 INJECTION INTRAMUSCULAR; INTRAVENOUS EVERY 6 HOURS PRN
Status: DISCONTINUED | OUTPATIENT
Start: 2022-06-28 | End: 2022-06-28

## 2022-06-28 RX ORDER — OXYBUTYNIN CHLORIDE 5 MG/1
5 TABLET ORAL 2 TIMES DAILY
Status: DISCONTINUED | OUTPATIENT
Start: 2022-06-28 | End: 2022-07-05 | Stop reason: HOSPADM

## 2022-06-28 RX ORDER — ARIPIPRAZOLE 5 MG/1
5 TABLET ORAL DAILY
Status: DISCONTINUED | OUTPATIENT
Start: 2022-06-28 | End: 2022-07-05 | Stop reason: HOSPADM

## 2022-06-28 RX ORDER — ONDANSETRON 4 MG/1
4 TABLET, ORALLY DISINTEGRATING ORAL EVERY 8 HOURS PRN
Status: DISCONTINUED | OUTPATIENT
Start: 2022-06-28 | End: 2022-06-28

## 2022-06-28 RX ORDER — POTASSIUM CHLORIDE 20 MEQ/1
40 TABLET, EXTENDED RELEASE ORAL PRN
Status: DISCONTINUED | OUTPATIENT
Start: 2022-06-28 | End: 2022-07-05 | Stop reason: HOSPADM

## 2022-06-28 RX ORDER — POTASSIUM CHLORIDE 7.45 MG/ML
10 INJECTION INTRAVENOUS PRN
Status: DISCONTINUED | OUTPATIENT
Start: 2022-06-28 | End: 2022-07-05 | Stop reason: HOSPADM

## 2022-06-28 RX ORDER — VENLAFAXINE HYDROCHLORIDE 75 MG/1
75 CAPSULE, EXTENDED RELEASE ORAL DAILY
Status: DISCONTINUED | OUTPATIENT
Start: 2022-06-28 | End: 2022-07-05 | Stop reason: HOSPADM

## 2022-06-28 RX ADMIN — SODIUM CHLORIDE: 9 INJECTION, SOLUTION INTRAVENOUS at 01:12

## 2022-06-28 RX ADMIN — RANOLAZINE 500 MG: 500 TABLET, EXTENDED RELEASE ORAL at 21:30

## 2022-06-28 RX ADMIN — GABAPENTIN 600 MG: 600 TABLET, FILM COATED ORAL at 09:29

## 2022-06-28 RX ADMIN — PROMETHAZINE HYDROCHLORIDE 12.5 MG: 25 INJECTION INTRAMUSCULAR; INTRAVENOUS at 09:49

## 2022-06-28 RX ADMIN — OXYBUTYNIN CHLORIDE 5 MG: 5 TABLET ORAL at 01:13

## 2022-06-28 RX ADMIN — OXYCODONE HYDROCHLORIDE 5 MG: 5 TABLET ORAL at 21:31

## 2022-06-28 RX ADMIN — ATORVASTATIN CALCIUM 80 MG: 80 TABLET, FILM COATED ORAL at 09:30

## 2022-06-28 RX ADMIN — SODIUM CHLORIDE, PRESERVATIVE FREE 10 ML: 5 INJECTION INTRAVENOUS at 21:31

## 2022-06-28 RX ADMIN — RANOLAZINE 500 MG: 500 TABLET, EXTENDED RELEASE ORAL at 01:13

## 2022-06-28 RX ADMIN — VENLAFAXINE HYDROCHLORIDE 75 MG: 75 CAPSULE, EXTENDED RELEASE ORAL at 09:27

## 2022-06-28 RX ADMIN — METOPROLOL SUCCINATE 50 MG: 50 TABLET, EXTENDED RELEASE ORAL at 09:27

## 2022-06-28 RX ADMIN — TIOTROPIUM BROMIDE INHALATION SPRAY 2 PUFF: 3.12 SPRAY, METERED RESPIRATORY (INHALATION) at 07:13

## 2022-06-28 RX ADMIN — VENLAFAXINE HYDROCHLORIDE 150 MG: 150 CAPSULE, EXTENDED RELEASE ORAL at 09:35

## 2022-06-28 RX ADMIN — OXYCODONE HYDROCHLORIDE 5 MG: 5 TABLET ORAL at 05:59

## 2022-06-28 RX ADMIN — GABAPENTIN 600 MG: 600 TABLET, FILM COATED ORAL at 21:30

## 2022-06-28 RX ADMIN — LISINOPRIL 10 MG: 10 TABLET ORAL at 09:30

## 2022-06-28 RX ADMIN — MORPHINE SULFATE 4 MG: 4 INJECTION, SOLUTION INTRAMUSCULAR; INTRAVENOUS at 01:13

## 2022-06-28 RX ADMIN — MULTIPLE VITAMINS W/ MINERALS TAB 1 TABLET: TAB at 09:30

## 2022-06-28 RX ADMIN — RANOLAZINE 500 MG: 500 TABLET, EXTENDED RELEASE ORAL at 09:30

## 2022-06-28 RX ADMIN — BUDESONIDE AND FORMOTEROL FUMARATE DIHYDRATE 2 PUFF: 160; 4.5 AEROSOL RESPIRATORY (INHALATION) at 07:13

## 2022-06-28 RX ADMIN — PANTOPRAZOLE SODIUM 40 MG: 40 TABLET, DELAYED RELEASE ORAL at 05:54

## 2022-06-28 RX ADMIN — SODIUM CHLORIDE, PRESERVATIVE FREE 10 ML: 5 INJECTION INTRAVENOUS at 09:35

## 2022-06-28 RX ADMIN — OXYCODONE HYDROCHLORIDE 5 MG: 5 TABLET ORAL at 15:12

## 2022-06-28 RX ADMIN — ALBUTEROL SULFATE 2 PUFF: 90 AEROSOL, METERED RESPIRATORY (INHALATION) at 07:13

## 2022-06-28 RX ADMIN — ARIPIPRAZOLE 5 MG: 5 TABLET ORAL at 09:46

## 2022-06-28 RX ADMIN — BUDESONIDE AND FORMOTEROL FUMARATE DIHYDRATE 2 PUFF: 160; 4.5 AEROSOL RESPIRATORY (INHALATION) at 19:18

## 2022-06-28 RX ADMIN — POTASSIUM CHLORIDE 40 MEQ: 1500 TABLET, EXTENDED RELEASE ORAL at 14:36

## 2022-06-28 RX ADMIN — OXYBUTYNIN CHLORIDE 5 MG: 5 TABLET ORAL at 09:27

## 2022-06-28 RX ADMIN — OXYBUTYNIN CHLORIDE 5 MG: 5 TABLET ORAL at 21:30

## 2022-06-28 RX ADMIN — GABAPENTIN 600 MG: 600 TABLET, FILM COATED ORAL at 14:36

## 2022-06-28 ASSESSMENT — PAIN DESCRIPTION - LOCATION: LOCATION: ABDOMEN

## 2022-06-28 ASSESSMENT — ENCOUNTER SYMPTOMS
ABDOMINAL PAIN: 1
ABDOMINAL DISTENTION: 0
BACK PAIN: 0
SORE THROAT: 0
SHORTNESS OF BREATH: 1
DIARRHEA: 0
CONSTIPATION: 0
NAUSEA: 1
WHEEZING: 0
COUGH: 0
VOMITING: 1

## 2022-06-28 ASSESSMENT — PAIN SCALES - GENERAL
PAINLEVEL_OUTOF10: 6
PAINLEVEL_OUTOF10: 8
PAINLEVEL_OUTOF10: 7

## 2022-06-28 ASSESSMENT — PAIN DESCRIPTION - DESCRIPTORS: DESCRIPTORS: CRAMPING;DISCOMFORT

## 2022-06-28 NOTE — PROGRESS NOTES
Comprehensive Nutrition Assessment    Type and Reason for Visit:  Initial,Positive Nutrition Screen (wt loss, poor appetite)    Nutrition Recommendations/Plan:   1. Will provide 5 carbohydrate choices per tray  2. Will add Magic Cup to trays twice daily     Malnutrition Assessment:  Malnutrition Status: At risk for malnutrition (Comment) (06/28/22 5023)    Context:  Acute Illness     Findings of the 6 clinical characteristics of malnutrition:  Energy Intake:  50% or less of estimated energy requirements for 5 or more days  Weight Loss:  Unable to assess     Body Fat Loss:  No significant body fat loss     Muscle Mass Loss:  No significant muscle mass loss    Fluid Accumulation:  No significant fluid accumulation     Strength:  Not Performed    Nutrition Assessment:    Pt was admitted due to pulmonary embolism. She was recently admitted for Metastatic Liver Ca and plan is for Bx soon. Pt does not like Ensure but reports very early satiety, minimal intake prior to admit. No recent wts available to verify wt loss. Nutrition Related Findings:    3+ pitting BLE edema, Labs/Meds: Reviewed, PMH: CHF, COPD< DM Wound Type: None       Current Nutrition Intake & Therapies:    Average Meal Intake: 51-75%     ADULT DIET; Regular; 4 carb choices (60 gm/meal)    Anthropometric Measures:  Height: 5' (152.4 cm)  Ideal Body Weight (IBW): 100 lbs (45 kg)    Admission Body Weight: 186 lb (84.4 kg)  Current Body Weight: 186 lb (84.4 kg), 186 % IBW.  Weight Source: Bed Scale  Current BMI (kg/m2): 36.3  Usual Body Weight:  (215# in 2020)                       BMI Categories: Obese Class 2 (BMI 35.0 -39.9)    Estimated Daily Nutrient Needs:  Energy Requirements Based On: Formula     Energy (kcal/day): 8780-3384 kcal ( [place] x 1.3)  Weight Used for Protein Requirements: Current  Protein (g/day): 1.5 g/kg= 125 g     Fluid (ml/day):      Nutrition Diagnosis:   · Inadequate oral intake related to  (current medical condition, poor appetite) as evidenced by intake 51-75%,poor intake prior to admission      Nutrition Interventions:   Food and/or Nutrient Delivery: Modify Current Diet,Start Oral Nutrition Supplement  Nutrition Education/Counseling: No recommendation at this time  Coordination of Nutrition Care: Continue to monitor while inpatient       Goals:     Goals: PO intake 50% or greater       Nutrition Monitoring and Evaluation:      Food/Nutrient Intake Outcomes: Food and Nutrient Intake,Supplement Intake  Physical Signs/Symptoms Outcomes: Biochemical Data,GI Status,Fluid Status or Edema,Skin,Weight    Discharge Planning:     Too soon to determine     Luis E Sotelo, [address], [place]  Contact: [phone]

## 2022-06-28 NOTE — TELEPHONE ENCOUNTER
Name: Sung Rivera  : 1950  MRN: <[id number]>    Oncology Navigation Follow-Up Note    Contact Type:  Telephone    Notes: Writer attempted to call patient to introduce self as navigator. VM full, unable to leave message. Will try again at later date.     Electronically signed by Bar Goodman RN on 2022 at 8:13 AM

## 2022-06-28 NOTE — FLOWSHEET NOTE
See ACP note       06/28/22 1152   Encounter Summary   Encounter Overview/Reason  Advance Care Planning   Service Provided For: Patient   Referral/Consult From: Nurse   Last Encounter  06/28/22   Spiritual/Emotional needs   Type Spiritual Support   Advance Care Planning   Type ACP conversation   Assessment/Intervention/Outcome   Assessment Calm   Intervention Active listening   Outcome Expressed Gratitude

## 2022-06-28 NOTE — CARE COORDINATION
CASE MANAGEMENT NOTE:    Admission Date:  6/27/2022 Dilia Michael is a 81 y.o.  female    Admitted for : Pulmonary embolism on right ([address] [I26.99]    Met with:  Patient    PCP:  Minerva Leal MD                                Insurance:  [address]      Is patient alert and oriented at time of discussion:  Yes    Current Residence/ Living Arrangements:  independently at home             Current Services PTA:  No    Does patient go to outpatient dialysis: No  If yes, location and chair time:     Is patient agreeable to VNS: pt thinks she will need SNF at this time, if she does not quality would be interested in SNF    Freedom of choice provided:  Yes    List of [address] provided: No    VNS chosen:  No    DME:  straight cane, walker and other glucometer/supplies    Home Oxygen: No    Nebulizer: No    CPAP/BIPAP: No    Supplier: N/A    Potential Assistance Needed: No    SNF needed: Yes, awaiting PT/OT eval    Freedom of choice and list provided: notified LSW    Pharmacy:  [facility] on [address]       Is patient currently receiving oral anticoagulation therapy? No    Is the Patient an [facility] with Readmission Risk Score greater than 14%? Yes  If yes, pt needs a follow up appointment made within 7 days. Family Members/Caregivers that pt would like involved in their care:    Yes    If yes, list name here:  Vickey Pollack    Transportation Provider:  Family and Friends             Discharge Plan:  06/28. [facility]/ [employer] [facility]. Pt from home independent, family and friends transport. DME: walker, cane, glucometer. Pt wants SNF. Notified LSW. Palliative consulted. Heparin Gtt. KIM WILL NEED SIGNED/COMPLETED. //SS         ACP not completed d/t palliative consult.      Electronically signed by: Iliana Amaro RN on 6/28/2022 at 9:45 AM

## 2022-06-28 NOTE — ED PROVIDER NOTES
EMERGENCY DEPARTMENT ENCOUNTER   ATTENDING ATTESTATION     Pt Name: Ramila Guerra  MRN: [id number]  [place] 1950  Date of evaluation: 6/28/22       Ramila Guerra is a 81 y.o. female who presents with Fatigue and Abdominal Pain      MDM:   79-year-old female recent hospitalization for metastatic malignancy likely pancreatic cancer, diabetes, COPD, hyperlipidemia presents for evaluation with worsening fatigue and diffuse abdominal discomfort, lower extremity swelling and shortness of breath. Vital signs are stable. Work-up revealed new pulmonary emboli in the right upper and right middle lobes. Will start on heparin infusion and admit to the hospital.  No signs of right heart strain. EKG  Sinus rhythm rate 62 right axis, borderline QRS, Q waves in inferior leads, anterior T wave inversions, otherwise normal intervals nonspecific changes    Critical care  Due to the immediate potential for life-threatening deterioration due to acute pulmonary embolism , I spent 30 minutes providing critical care. This time is excluding time spent performing procedures. Vitals:   Vitals:    06/27/22 2221 06/27/22 2222 06/27/22 2320 06/28/22 0030   BP: (!) 99/59  (!) 94/56 (!) 107/53   Pulse:  69 66 63   Resp:  16 18 16   Temp:    98.1 °F (36.7 °C)   TempSrc:    Oral   SpO2: 95% 94% 97% 97%         I personally saw and examined the patient. I have reviewed and agree with the resident's findings, including all diagnostic interpretations and treatment plan as written. I was present for the key portions of any procedures performed and the inclusive time noted for any critical care statement. The care is provided during an unprecedented national emergency due to the novel coronavirus, COVID 19.   Jose Manuel Hammer MD  Attending Emergency Physician            Jose Manuel Hammer MD  06/28/22 6150

## 2022-06-28 NOTE — CARE COORDINATION
SW spoke with patient about discharge plans. Patient reported that she will be agreeable to go to [facility]. SW sent referral to facility via [email or web address].  Electronically signed by Yamileth Davey on 6/28/2022 at 11:49 AM

## 2022-06-28 NOTE — ED NOTES
Report given to URIEL Sagastume from ER. Report method in person   The following was reviewed with receiving RN:   Current vital signs:  BP (!) 94/56   Pulse 66   Temp 97.6 °F (36.4 °C)   Resp 18   SpO2 97%                MEWS Score: 1     Any medication or safety alerts were reviewed. Any pending diagnostics and notifications were also reviewed, as well as any safety concerns or issues, abnormal labs, abnormal imaging, and abnormal assessment findings. Questions were answered.             Kimberly Harper RN  06/27/22 6525

## 2022-06-28 NOTE — ED NOTES
Report given to [facility] in PCU. Opportunities for questions offered, no further needs.       Rudy Harper, RN  06/28/22 0005

## 2022-06-28 NOTE — H&P
[id number] [facility]     HISTORY AND PHYSICAL EXAMINATION            Date:   6/28/2022  Patientname: Tha Hernandez  Date of admission:  6/27/2022  6:49 PM  MRN:   [id number]  Account:  [de-identified]  YOB: 1950  PCP:    Lamar Kamara MD  Room:   2095/2095-01  Code Status:    Full Code    CHIEF COMPLAINT     Chief Complaint   Patient presents with    Fatigue    Abdominal Pain       HISTORY OF PRESENT ILLNESS  (Character, Onset, Location, Duration,  Exacerbating/RelievingFactors, Radiation,   Associated Symptoms, Severity )      The patient is a 81 y.o.  female, with a history of choledocholithiasis, COPD, CAD, HTN, liver mass, morbid obesity, and DM type II, who presents with fatigue and abdominal pain. According to patient and EHR, she was discharged from this facility on 6/24 after receiving a new diagnosis of metastatic liver disease and suspicion for Pancreatic cancer. Patient reports that since her returning home, her abdominal pain has been uncontrolled. Reports that part of the problem is that she cannot remember when she takes her medication and therefore is afraid to take additional doses. Patient reports that she is to have a liver biopsy completed; however, her insurance has not yet gotten back to her regarding her prior authorization approval.  Patient is to be holding aspirin and Plavix prior to biopsy. Symptoms are associated with shortness of breath, especially with activity and swelling in bilateral lower extremities - left > right. Denies fever, chills, cough, diarrhea, and urinary symptoms. There are no aggravating or alleviating factors. Symptoms are reported as constant and moderate to severe. Patient's ab splint and Plavix have been on hold since discharge-awaiting liver biopsy, the patient does report that she took these 2 meds 3 days prior accidentally because they were part of her \"pill pack. \"    PAST MEDICAL HISTORY   Patient  has a past medical history of Acid reflux, Arthritis, Cataracts, bilateral, CHF (congestive heart failure) ([facility] [address] COPD (chronic obstructive pulmonary disease) ([facility] [address] Gall stones, Head injury, Hyperlipidemia, Hypertension, Insomnia, MI (myocardial infarction) ([facility] [address] Neuropathy, Sleep apnea, and Type 2 diabetes mellitus without complication ([facility] [address] PAST SURGICAL HISTORY    Patient  has a past surgical history that includes Femur Surgery; Foot surgery (Left); Mandible fracture surgery; Cardiac surgery (1999); Ankle surgery (Left); Tonsillectomy; and Coronary angioplasty with stent (1999). FAMILY HISTORY    Patient family history includes Other in her father; Stroke in her mother. SOCIAL HISTORY    Patient  reports that she quit smoking about 22 years ago. Her smoking use included cigarettes. She has a 40.00 pack-year smoking history. She has never used smokeless tobacco. She reports current alcohol use of about 1.0 standard drink of alcohol per week. She reports current drug use. Drug: Marijuana Tanyaan Mueller). HOME MEDICATIONS        Prior to Admission medications    Medication Sig Start Date End Date Taking? Authorizing Provider   glucose monitoring (FREESTYLE FREEDOM) kit 1 kit by Does not apply route daily as needed (diabetic) 6/24/22   Kala Romo MD   FreeStyle Lancets MISC 1 each by Does not apply route daily 6/24/22   Kala Romo MD   Blood Glucose Monitoring Suppl (FREESTYLE LITE) STACY 1 Device by Does not apply route daily as needed (diabetic) 6/24/22   Kala Romo MD   Blood Glucose Monitoring Suppl (FREESTYLE FREEDOM LITE) w/Device KIT 1 kit by Does not apply route daily 6/24/22   Kala Romo MD   Insulin Syringe-Needle U-100 30G X 5/16\" 0.5 ML MISC 1 each by Does not apply route daily 6/24/22   Kala Romo MD   oxyCODONE HCl 5 MG TABA Take 5 mg by mouth 4 times daily as needed (moderate to severe pain) for up to 7 days.  6/24/22 7/1/22  Kala Romo MD   gabapentin (NEURONTIN) 600 MG tablet Take 600 mg by mouth 3 times daily. Historical Provider, MD   albuterol sulfate  (90 Base) MCG/ACT inhaler Inhale 2 puffs into the lungs every 6 hours as needed for Wheezing or Shortness of Breath 5/26/21   Kristofer Whitley, APRN - CNP   ranolazine (RANEXA) 500 MG extended release tablet Take 1 tablet by mouth 2 times daily 3/12/21   Kristofer Whitley, APRN - CNP   lisinopril (PRINIVIL;ZESTRIL) 10 MG tablet take 1 tablet by mouth once daily 3/12/21   Kristofer Whitley, APRN - CNP   venlafaxine (EFFEXOR XR) 150 MG extended release capsule Take 1 capsule by mouth daily Take with 75 mg capsule 2/26/21   Kristofer Whitley, BENNETT Wang CNP   ARIPiprazole (ABILIFY) 5 MG tablet Take 1 tablet by mouth daily 2/26/21   Kristofer Whitley, BENNETT Wang CNP   budesonide-formoterol [facility]) 160-4.5 MCG/ACT AERO Inhale 2 puffs into the lungs 2 times daily 2/26/21   Kristofer Whitley, APRN - CNP   tiotropium (SPIRIVA RESPIMAT) 2.5 MCG/ACT AERS inhaler Inhale 2 puffs into the lungs daily 2/26/21   Kristofer Whitley, APRN - CNP   traZODone (DESYREL) 50 MG tablet take 1 tablet by mouth at bedtime if needed for sleep 12/30/20   Historical Provider, MD   oxybutynin (DITROPAN) 5 MG tablet take 1 tablet by mouth twice a day 2/26/21   BENNETT Kilpatrick CNP   glucose monitoring kit (FREESTYLE) monitoring kit 1 kit by Does not apply route daily 10/3/20   BENNETT Kilpatrick CNP   blood glucose monitor strips Test 3 times a day & as needed for symptoms of irregular blood glucose. Dispense sufficient amount for indicated testing frequency plus additional to accommodate PRN testing needs.  10/3/20   BENNETT Kilpatrick CNP   Lancets MISC 1 each by Does not apply route 3 times daily 10/3/20   BENNETT Kilpatrick CNP   atorvastatin (LIPITOR) 80 MG tablet Take 1 tablet by mouth daily 9/30/20   Kristofer Whitley, APRN - CNP   bisoprolol (ZEBETA) 5 MG tablet take 1 tablet by mouth once daily 9/30/20   BENNETT Kilpatrick CNP   omeprazole (PRILOSEC) 40 MG delayed release capsule take 1 capsule by mouth every morning BEFORE BREAKFAST 9/30/20   BENNETT Hough CNP   venlafaxine (EFFEXOR XR) 75 MG extended release capsule Take 1 capsule by mouth daily 9/30/20   BENNETT Hough CNP   glimepiride (AMARYL) 2 MG tablet take 1 tablet by mouth twice a day before meals 9/30/20   BENNETT Hough CNP   Semaglutide, 1 MG/DOSE, (OZEMPIC, 1 MG/DOSE,) 2 MG/1.5ML SOPN Inject 1 mg per week 9/30/20   BENNETT Hough CNP   Multiple Vitamins-Minerals (THERAPEUTIC MULTIVITAMIN-MINERALS) tablet Take 1 tablet by mouth daily 9/30/20   BENNETT Hough CNP   Melatonin 5 MG SUBL Place 1 applicator under the tongue nightly as needed (insomnia) 9/30/20   BENNETT Hough CNP   Insulin Pen Needle 32G X 4 MM MISC 1 each by Does not apply route once a week 3/12/20   Han Keys MD   fluticasone [facility]) 50 MCG/ACT nasal spray 2 sprays by Nasal route daily 9/13/19   Han Keys MD       ALLERGIES      Chantix [varenicline tartrate] and Sulphadimidine [sulfamethazine]    REVIEW OF SYSTEMS     Review of Systems   Constitutional: Positive for fatigue. Negative for chills, diaphoresis and fever. HENT: Negative for congestion and sore throat. Respiratory: Positive for shortness of breath. Negative for cough and wheezing. Cardiovascular: Positive for chest pain (With inspiration) and leg swelling. Negative for palpitations. Gastrointestinal: Positive for abdominal pain, nausea and vomiting. Negative for abdominal distention, constipation and diarrhea. Genitourinary: Negative for dysuria, frequency and urgency. Musculoskeletal: Negative for back pain and myalgias. Skin: Negative for rash. Neurological: Negative for dizziness, weakness and headaches. Psychiatric/Behavioral: The patient is not nervous/anxious.       PHYSICAL EXAM      BP (!) 107/53   Pulse 63   Temp 98.1 °F (36.7 °C) (Oral)   Resp 16   SpO2 97%  There is degrees   Narrative:     *Suspect arm lead reversal, interpretation assumes no reversal   Sinus rhythm with short NE   Low voltage QRS   Inferior infarct , age undetermined   Anterolateral infarct (cited on or before 22-JUN-2022)   Abnormal ECG   When compared with ECG of 22-JUN-2022 19:01,   QRS axis Shifted right   Serial changes of evolving Anterior infarct Present   Serial changes of evolving Anterolateral infarct Present     Labs:  CBC:   Recent Labs     06/27/22 2005   WBC 11.7*   HGB 11.0*   *     BMP:    Recent Labs     06/27/22 2005   *   K 3.1*   CL 95*   CO2 26   BUN 7*   CREATININE 0.62   GLUCOSE 98     S. Calcium:  Recent Labs     06/27/22 2005   CALCIUM 8.3*     S. Ionized Calcium:No results for input(s): IONCA in the last 72 hours. S. Magnesium:  Recent Labs     06/27/22 2005   MG 1.5*     S. Phosphorus:No results for input(s): PHOS in the last 72 hours. S. Glucose:  Recent Labs     06/28/22  0057   POCGLU 91     Glycosylated hemoglobin A1C:   Lab Results   Component Value Date    LABA1C 6.3 06/03/2021     Hepatic:   Recent Labs     06/27/22 2005   AST 29   ALT 13   ALKPHOS 108*     CARDIAC ENZY:   Recent Labs     06/27/22 2005 06/27/22  2218   TROPHS 13 14     INR:   Recent Labs     06/27/22 2005   INR 1.2     BNP:   Recent Labs     06/27/22 2005   PROBNP 1,834*      ABGs: No results for input(s): PH, PCO2, PO2, HCO3, O2SAT in the last 72 hours. Lipids: No results for input(s): CHOL, TRIG, HDL, LDL, LDLCALC in the last 72 hours. Pancreatic functions:  Recent Labs     06/27/22 2005   LIPASE 27     S. LacticAcid: No results for input(s): LACTA in the last 72 hours. Thyroid functions:   Lab Results   Component Value Date    TSH 0.84 11/04/2020      U/A:No results for input(s): NITRITE, COLORU, WBCUA, RBCUA, MUCUS, BACTERIA, CLARITYU, SPECGRAV, LEUKOCYTESUR, BLOODU, GLUCOSEU, AMORPHOUS in the last 72 hours.     Invalid input(s): Noble Littler  No results for input(s): COVID19 in the last 72 hours. Imaging/Diagonstics:     CT ABDOMEN PELVIS W IV CONTRAST Additional Contrast? None    Result Date: 6/27/2022  EXAMINATION: CTA OF THE CHEST; CT OF THE ABDOMEN AND PELVIS WITH CONTRAST 6/27/2022 6:09 pm; 6/27/2022 6:20 pm TECHNIQUE: CTA of the chest was performed after the administration of intravenous contrast.  Multiplanar reformatted images are provided for review. MIP images are provided for review. Automated exposure control, iterative reconstruction, and/or weight based adjustment of the mA/kV was utilized to reduce the radiation dose to as low as reasonably achievable.; CT of the abdomen and pelvis was performed with the administration of intravenous contrast. Multiplanar reformatted images are provided for review. Automated exposure control, iterative reconstruction, and/or weight based adjustment of the mA/kV was utilized to reduce the radiation dose to as low as reasonably achievable. COMPARISON: None CT abdomen pelvis 06/22/2022 HISTORY: ORDERING SYSTEM PROVIDED HISTORY: elevated D dimer, SOB TECHNOLOGIST PROVIDED HISTORY: elevated D dimer, SOB Decision Support Exception - unselect if not a suspected or confirmed emergency medical condition->Emergency Medical Condition (MA) Reason for Exam: elevated D dimer, SOB Relevant Medical/Surgical History: hx. of smoking and COPD. pt. states \"I was here lst week and they said I have cancer of some kind;not sure what kind yet. \"; ORDERING SYSTEM PROVIDED HISTORY: worsened abd pain, hx metastatic ca TECHNOLOGIST PROVIDED HISTORY: worsened abd pain, hx metastatic ca Decision Support Exception - unselect if not a suspected or confirmed emergency medical condition->Emergency Medical Condition (MA) Reason for Exam: pt. c/o SOB, body pains Relevant Medical/Surgical History: hx. of smoking, COPD. pt. states \"I was ere last week, and they said I have some kind of cancer. \" FINDINGS: Chest: Pulmonary Arteries: Pulmonary arteries are adequately opacified for evaluation. There are small filling defects in the right interlobar artery extending into the posterior segmental and subsegmental right upper lobe branches and to a lesser degree into the segmental branches of the medial segment right middle lobe with overall small clot burden. No saddle embolus. Main pulmonary artery is normal in caliber. Mediastinum: No enlarged mediastinal nodes with a lower paratracheal node anterior to the sumit and some subcarinal nodes which are upper limits of normal in caliber. Morphologically abnormal suspicious right supradiaphragmatic lymph nodes. Cardiomegaly. Three-vessel coronary artery calcifications. No pericardial effusion. There is no acute abnormality of the thoracic aorta. Lungs/pleura: Increased right pleural effusion with increased atelectatic changes in the dependent right lung parenchyma, greatest in the right lower lobe. Minimal left lower lobe atelectasis. Indeterminate 4 mm solid noncalcified nodule in the anterior segment left upper lobe. No pneumothorax or left-sided pleural effusion. Central airways are patent. Soft Tissues/Bones: Degenerative changes without lytic or blastic osseous lesion. Remote median sternotomy which is healed. Calcifications in both breasts with a rim calcified likely oil cyst in the medial right breast.  No axillary adenopathy. Abdomen/Pelvis: Organs: Again noted are innumerable hypodense masses in the liver compatible with hepatic metastases. There is poor delineation of the gallbladder from the adjacent liver masses. There is cholelithiasis as well as choledocholithiasis with intrahepatic and extrahepatic biliary ductal dilatation which is overall similar to 5 days prior. The abnormal soft tissue attenuation extends toward the gastroduodenal junction with loss of the intervening fat plane. The spleen is normal in size and attenuation. The pancreas is atrophic. No peripancreatic inflammatory changes.   Adrenal glands remain normal caliber. Kidneys enhance symmetrically aside from a tiny hypodensity in the lateral upper pole left kidney that is too small to definitively characterize. There is no hydronephrosis. Normal caliber ureters. GI/Bowel: The bowel remains normal in caliber without obstruction. An appendix is not definitively identified. Pelvis: The urinary bladder is normal in appearance. Diminutive postmenopausal female pelvic organs. Peritoneum/Retroperitoneum: No free air. Small volume ascites. Irregular peritoneal soft tissue nodularity is redemonstrated compatible with peritoneal carcinomatosis. Enlarged lymph nodes about the yi hepatis. No gross pelvic adenopathy. Infrarenal abdominal aortic aneurysm measuring up to 3.1 cm on a background of moderate to heavy atherosclerosis. Bones/Soft Tissues: Degenerative changes predominating in the mid to lower lumbar facets. No lytic or blastic osseous lesion. Small fat containing umbilical hernia with a peritoneal nodule at the hernia orifice. Mild anasarca. [CHEST Small pulmonary emboli in the right upper and right middle lobes with an overall small clot burden. Increased right pleural effusion and associated atelectasis of the right lung. Indeterminate 4 mm nodule in the left upper lobe which will warrant attention on follow-up in this patient with findings of intra-abdominal malignancy. Morphologically abnormal appearing supradiaphragmatic lymph nodes on the right suspicious for possible intrathoracic metastatic josh spread. ABDOMEN/PELVIS Findings in the abdomen and pelvis are overall not substantially changed from 5 days prior. Diffuse hepatic metastases with loss of the fat plane between the liver and the gastroduodenal junction worrisome for possible direct extension. Poor delineation of the gallbladder either due to extension of the hepatic disease or possibly due to primary biliary neoplasm.   Cholelithiasis and choledocholithiasis with intrahepatic and extrahepatic biliary ductal dilatation, unchanged from recent prior. Peritoneal carcinomatosis and small volume presumably malignant ascites. Infrarenal abdominal aortic aneurysm spanning up to 3.1 cm with recommendations below. Critical results were called by Dr. Bharti Carlton to Dr. Eugenie Hughes on 6/27/2022 at 10:22 PM EST. RECOMMENDATIONS: 3.1 cm infrarenal abdominal aortic aneurysm. Recommend follow-up every 3 years. Reference: J Am Vish Radiol 5588;99:[id number]. CT ABDOMEN PELVIS W IV CONTRAST Additional Contrast? None    Result Date: 6/22/2022  EXAMINATION: CT OF THE ABDOMEN AND PELVIS WITH CONTRAST 6/22/2022 5:49 pm TECHNIQUE: CT of the abdomen and pelvis was performed with the administration of intravenous contrast. Multiplanar reformatted images are provided for review. Automated exposure control, iterative reconstruction, and/or weight based adjustment of the mA/kV was utilized to reduce the radiation dose to as low as reasonably achievable. COMPARISON: None. HISTORY: ORDERING SYSTEM PROVIDED HISTORY: abd pain TECHNOLOGIST PROVIDED HISTORY: abd pain Decision Support Exception - unselect if not a suspected or confirmed emergency medical condition->Emergency Medical Condition (MA) Reason for Exam: abd pain Additional signs and symptoms: emisis x1 wk, e coli FINDINGS: Lower Chest: Small right pleural effusion. Organs: Multiple suspicious liver lesions are present throughout the liver with confluent mass and capsular retraction measuring up to 5.5 cm involving segment 4/5. The gallbladder is not discretely visualized and may be directly involved. There also appears to be soft tissue extension abutting the stomach and duodenum on axial image 63. Biliary dilatation is present with choledocholithiasis noted in the distal common duct. The pancreas, spleen, adrenals and kidneys reveal no acute findings. GI/Bowel: There is no bowel dilatation or wall thickening identified. Pelvis: No acute findings. Peritoneum/Retroperitoneum: Soft tissue nodularity in the right abdomen is noted, compatible with peritoneal implants. Trace ascites. No free air or loculated fluid collection. Infrarenal aortic aneurysm measures up to 3.1 cm. Bones/Soft Tissues: No abnormality identified. Benign-appearing rim calcification in the right breast.     1.  Findings compatible with metastatic disease in the liver. The gallbladder is not discretely visualized separate from this locally aggressive process, which may be directly involved or the primary source of disease. Direct extension from the liver appears to involve the adjacent stomach and duodenum. 2.  Findings compatible with peritoneal carcinomatosis with discrete peritoneal implants. Trace abdominopelvic ascites. 3.  Choledocholithiasis with biliary dilatation. 4.  Infrarenal aortic aneurysm measuring up to 3.1 cm. Please see recommendation below. RECOMMENDATIONS: 3.1 cm infrarenal abdominal aortic aneurysm. Recommend follow-up every 3 years. Reference: J Am Vish Radiol 5519;75:[id number]. XR CHEST PORTABLE    Result Date: 6/27/2022  EXAMINATION: ONE XRAY VIEW OF THE CHEST 6/27/2022 4:39 pm COMPARISON: 03/15/2010 HISTORY: ORDERING SYSTEM PROVIDED HISTORY: SOB, CP TECHNOLOGIST PROVIDED HISTORY: SOB, CP Reason for Exam: PT CO cough with SOB and CP X several days. FINDINGS: Postsurgical changes overlying the mediastinum. The cardiac size is normal. Mild bibasal fibrosis and small right pleural effusion. .  Pulmonary vascularity appears normal. There is mild ectasia of the thoracic aorta. There are degenerative changes in the spine . No acute bony abnormalities. The hilar structures are normal.     Mild bibasal fibrosis and small right pleural effusion.      CT CHEST PULMONARY EMBOLISM W CONTRAST    Result Date: 6/27/2022  EXAMINATION: CTA OF THE CHEST; CT OF THE ABDOMEN AND PELVIS WITH CONTRAST 6/27/2022 6:09 pm; 6/27/2022 6:20 pm TECHNIQUE: CTA of the chest was performed after the administration of intravenous contrast.  Multiplanar reformatted images are provided for review. MIP images are provided for review. Automated exposure control, iterative reconstruction, and/or weight based adjustment of the mA/kV was utilized to reduce the radiation dose to as low as reasonably achievable.; CT of the abdomen and pelvis was performed with the administration of intravenous contrast. Multiplanar reformatted images are provided for review. Automated exposure control, iterative reconstruction, and/or weight based adjustment of the mA/kV was utilized to reduce the radiation dose to as low as reasonably achievable. COMPARISON: None CT abdomen pelvis 06/22/2022 HISTORY: ORDERING SYSTEM PROVIDED HISTORY: elevated D dimer, SOB TECHNOLOGIST PROVIDED HISTORY: elevated D dimer, SOB Decision Support Exception - unselect if not a suspected or confirmed emergency medical condition->Emergency Medical Condition (MA) Reason for Exam: elevated D dimer, SOB Relevant Medical/Surgical History: hx. of smoking and COPD. pt. states \"I was here lst week and they said I have cancer of some kind;not sure what kind yet. \"; ORDERING SYSTEM PROVIDED HISTORY: worsened abd pain, hx metastatic ca TECHNOLOGIST PROVIDED HISTORY: worsened abd pain, hx metastatic ca Decision Support Exception - unselect if not a suspected or confirmed emergency medical condition->Emergency Medical Condition (MA) Reason for Exam: pt. c/o SOB, body pains Relevant Medical/Surgical History: hx. of smoking, COPD. pt. states \"I was ere last week, and they said I have some kind of cancer. \" FINDINGS: Chest: Pulmonary Arteries: Pulmonary arteries are adequately opacified for evaluation. There are small filling defects in the right interlobar artery extending into the posterior segmental and subsegmental right upper lobe branches and to a lesser degree into the segmental branches of the medial segment right middle lobe with overall small clot burden.   No An appendix is not definitively identified. Pelvis: The urinary bladder is normal in appearance. Diminutive postmenopausal female pelvic organs. Peritoneum/Retroperitoneum: No free air. Small volume ascites. Irregular peritoneal soft tissue nodularity is redemonstrated compatible with peritoneal carcinomatosis. Enlarged lymph nodes about the yi hepatis. No gross pelvic adenopathy. Infrarenal abdominal aortic aneurysm measuring up to 3.1 cm on a background of moderate to heavy atherosclerosis. Bones/Soft Tissues: Degenerative changes predominating in the mid to lower lumbar facets. No lytic or blastic osseous lesion. Small fat containing umbilical hernia with a peritoneal nodule at the hernia orifice. Mild anasarca. [CHEST Small pulmonary emboli in the right upper and right middle lobes with an overall small clot burden. Increased right pleural effusion and associated atelectasis of the right lung. Indeterminate 4 mm nodule in the left upper lobe which will warrant attention on follow-up in this patient with findings of intra-abdominal malignancy. Morphologically abnormal appearing supradiaphragmatic lymph nodes on the right suspicious for possible intrathoracic metastatic josh spread. ABDOMEN/PELVIS Findings in the abdomen and pelvis are overall not substantially changed from 5 days prior. Diffuse hepatic metastases with loss of the fat plane between the liver and the gastroduodenal junction worrisome for possible direct extension. Poor delineation of the gallbladder either due to extension of the hepatic disease or possibly due to primary biliary neoplasm. Cholelithiasis and choledocholithiasis with intrahepatic and extrahepatic biliary ductal dilatation, unchanged from recent prior. Peritoneal carcinomatosis and small volume presumably malignant ascites. Infrarenal abdominal aortic aneurysm spanning up to 3.1 cm with recommendations below.  Critical results were called by Dr. Samir De Anda to  Wai Corralerwin on 6/27/2022 at 10:22 PM EST. RECOMMENDATIONS: 3.1 cm infrarenal abdominal aortic aneurysm. Recommend follow-up every 3 years. Reference: J Am Vish Radiol 2156;[id number]. ASSESSMENT  and  PLAN     Principal Problem:    Pulmonary embolism on right [facility])  Resolved Problems:    * No resolved hospital problems. *    Plan:    Pulmonary Embolism  -D-dimer - 6.80  -CT chest -small pulmonary emboli - right upper and middle lobes  --Overall small clot burden  --Increased right pleural effusion and associated atelectasis right lung  --Indeterminate 4 mm nodule TAMELA  ---Will need follow-up due to intra-abdominal malignancy  --Abnormal subdiaphragmatic lymph nodes on right  ---Suspicious for possible intrathoracic metastatic josh spread  -Heparin drip initiated in ED  --Continue on admission  --Please check if patient insurance covers Eliquis or xarelto  ----If not, will need to start warfarin today  -----consider obtaining liver biopsy prior to initiating oral anticoagulation  -Swelling noted in BLE - Lt > right  --Venous duplex scan in am to assess for DVT    Abdominal Pain  -CT abdomen/pelvis -diffuse hepatic metastasis  --Not substantially changed from 5 days prior  --See full report above  -Denies urinary symptoms  -having nausea and vomiting  --Abdominal pain not well controlled  ---Reports that she can not recall if she has taken meds or not so she becomes hesitant to take more. ---Continue home dose Percocet - monitor for need for additional meds    Hypokalemia  -K+ - 3.1  -K+ replacement protocol  -recheck BMP in am    Hypomagnesemia  -Mag level - 1.5  -2 gm IV replacement administered in ED  -monitor magnesium level daily  -replacement protocol ordered     Consultations:     Radha Booth4, APRN - CNP   6/28/2022  4:33 AM    Rachel Fernandez [address] [place] [address].    Phone [phone]     Agree with H&P, recorded by Marialuisa Bah

## 2022-06-28 NOTE — PROGRESS NOTES
Patient received from Ed via stretcher. Patient transferred to bed. Vitals taken. Assessment to follow. No distress noted. See doc flowsheet and transfer navigator for details. POC and education reviewed with patient. Call light within reach, and pt educated on its use. Bed in lowest position, and locked. Side rails up x 2. Denied further questions or needs at this time. Will continue to monitor.

## 2022-06-28 NOTE — ACP (ADVANCE CARE PLANNING)
Advance Care Planning     General Advance Care Planning (ACP) Conversation    Date of Conversation: 6/27/2022  Conducted with: Patient with Decision Making Capacity        Content/Action Overview:           Patient wanted the ACP documents and she will look at them and call the [facility] office when she is ready to fill them out.     Length of Voluntary ACP Conversation in minutes:  <16 minutes (Non-Billable)    Lennart Angelucci

## 2022-06-28 NOTE — CONSULTS
Today's Date: 6/28/2022  Patient Name: Fareed Collins  Date of admission: 6/27/2022  6:49 PM  Patient's age: 81 y.o., 1950  Admission Dx: Pulmonary embolism on right [facility]) [I26.99]    Reason for Consult: PE  Requesting Physician: Noelle Aviles MD    CHIEF COMPLAINT:    Chief Complaint   Patient presents with    Fatigue    Abdominal Pain     History Obtained From:  patient and chart    HISTORY OF PRESENT ILLNESS:      Fareed Collins is a 81 y.o. female who is admitted to the hospital on 6/27/2022  for fatigue and abd pain. Patient was recently admitted to hospital about a week ago and at that time she had a CT scan of the abdomen which showed multiple liver lesions suspicious for metastasis. She was evaluated by oncology. Her tumor markers showed very high CA 19-9 and CEA and AFP. 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 IR guided biopsy was ordered but because patient was on Plavix plan was to do biopsy as outpatient. CT chest abdomen pelvis and CT chest showed small pulmonary emboli in the right upper and right middle lobes with very small clot burden. Oncology consulted for further evaluation. Past Medical History:   has a past medical history of Acid reflux, Arthritis, Cataracts, bilateral, CHF (congestive heart failure) ([address] COPD (chronic obstructive pulmonary disease) ([address] Gall stones, Head injury, Hyperlipidemia, Hypertension, Insomnia, MI (myocardial infarction) ([address] Neuropathy, Sleep apnea, and Type 2 diabetes mellitus without complication ([address] Past Surgical History:   has a past surgical history that includes Femur Surgery; Foot surgery (Left); Mandible fracture surgery; Cardiac surgery (1999); Ankle surgery (Left); Tonsillectomy; and Coronary angioplasty with stent (1999). Medications:    Prior to Admission medications    Medication Sig Start Date End Date Taking?  Authorizing Provider   glucose monitoring (FREESTYLE FREEDOM) kit 1 kit by Does not apply route daily as needed (diabetic) 6/24/22   Mingo Tyler MD   FreeStyle Lancets MISC 1 each by Does not apply route daily 6/24/22   Mingo Tyler MD   Blood Glucose Monitoring Suppl (FREESTYLE LITE) STACY 1 Device by Does not apply route daily as needed (diabetic) 6/24/22   Mingo Tyler MD   Blood Glucose Monitoring Suppl (FREESTYLE FREEDOM LITE) w/Device KIT 1 kit by Does not apply route daily 6/24/22   Mingo Tyler MD   Insulin Syringe-Needle U-100 30G X 5/16\" 0.5 ML MISC 1 each by Does not apply route daily 6/24/22   Mingo Tyler MD   oxyCODONE HCl 5 MG TABA Take 5 mg by mouth 4 times daily as needed (moderate to severe pain) for up to 7 days. 6/24/22 7/1/22  Mingo Tyler MD   gabapentin (NEURONTIN) 600 MG tablet Take 600 mg by mouth 3 times daily.     Historical Provider, MD   albuterol sulfate  (90 Base) MCG/ACT inhaler Inhale 2 puffs into the lungs every 6 hours as needed for Wheezing or Shortness of Breath 5/26/21   BENNETT Lauren CNP   ranolazine (RANEXA) 500 MG extended release tablet Take 1 tablet by mouth 2 times daily 3/12/21   BENNETT Lauren CNP   lisinopril (PRINIVIL;ZESTRIL) 10 MG tablet take 1 tablet by mouth once daily 3/12/21   BENNETT Lauren CNP   venlafaxine (EFFEXOR XR) 150 MG extended release capsule Take 1 capsule by mouth daily Take with 75 mg capsule 2/26/21   BENNETT Lauren CNP   ARIPiprazole (ABILIFY) 5 MG tablet Take 1 tablet by mouth daily 2/26/21   BENNETT Lauren CNP   budesonide-formoterol [facility]) 160-4.5 MCG/ACT AERO Inhale 2 puffs into the lungs 2 times daily 2/26/21   BENNETT Lauren CNP   tiotropium (SPIRIVA RESPIMAT) 2.5 MCG/ACT AERS inhaler Inhale 2 puffs into the lungs daily 2/26/21   BENNETT Lauren CNP   traZODone (DESYREL) 50 MG tablet take 1 tablet by mouth at bedtime if needed for sleep 12/30/20   Historical Provider, MD   oxybutynin (DITROPAN) 5 MG tablet take 1 tablet by mouth twice a day 2/26/21   BENNETT Kim CNP   glucose monitoring kit (FREESTYLE) monitoring kit 1 kit by Does not apply route daily 10/3/20   BENNETT Kim CNP   blood glucose monitor strips Test 3 times a day & as needed for symptoms of irregular blood glucose. Dispense sufficient amount for indicated testing frequency plus additional to accommodate PRN testing needs.  10/3/20   BENNETT Kim CNP   Lancets MISC 1 each by Does not apply route 3 times daily 10/3/20   BENNETT Kim CNP   atorvastatin (LIPITOR) 80 MG tablet Take 1 tablet by mouth daily 9/30/20   BENNETT Kim CNP   bisoprolol (ZEBETA) 5 MG tablet take 1 tablet by mouth once daily 9/30/20   BENNETT Kim CNP   omeprazole (PRILOSEC) 40 MG delayed release capsule take 1 capsule by mouth every morning BEFORE BREAKFAST 9/30/20   BENNETT Kim CNP   venlafaxine (EFFEXOR XR) 75 MG extended release capsule Take 1 capsule by mouth daily 9/30/20   BENNETT Kim CNP   glimepiride (AMARYL) 2 MG tablet take 1 tablet by mouth twice a day before meals 9/30/20   BENNETT Kim CNP   Semaglutide, 1 MG/DOSE, (OZEMPIC, 1 MG/DOSE,) 2 MG/1.5ML SOPN Inject 1 mg per week 9/30/20   BENNETT Kim CNP   Multiple Vitamins-Minerals (THERAPEUTIC MULTIVITAMIN-MINERALS) tablet Take 1 tablet by mouth daily 9/30/20   BENNETT Kim CNP   Melatonin 5 MG SUBL Place 1 applicator under the tongue nightly as needed (insomnia) 9/30/20   BENNETT Kim CNP   Insulin Pen Needle 32G X 4 MM MISC 1 each by Does not apply route once a week 3/12/20   Maine Santana MD   fluticasone Curlygermán Quijano) 50 MCG/ACT nasal spray 2 sprays by Nasal route daily 9/13/19   Maine Santana MD     Current Facility-Administered Medications   Medication Dose Route Frequency Provider Last Rate Last Admin    sodium chloride flush 0.9 % injection 5-40 mL  5-40 mL IntraVENous 2 times per day Chiquita Red APRN - CNP   10 mL at 9367    oxybutynin (DITROPAN) tablet 5 mg  5 mg Oral BID Larue Ray, APRN - CNP   5 mg at 06/28/22 4397    oxyCODONE (ROXICODONE) immediate release tablet 5 mg  5 mg Oral 4x Daily PRN Larue Ray, APRN - CNP   5 mg at 06/28/22 0559    ranolazine (RANEXA) extended release tablet 500 mg  500 mg Oral BID Larue Ray, APRN - CNP   500 mg at 06/28/22 0930    tiotropium (SPIRIVA RESPIMAT) 2.5 MCG/ACT inhaler 2 puff  2 puff Inhalation Daily Larue Ray, APRN - CNP   2 puff at 06/28/22 5166    traZODone (DESYREL) tablet 50 mg  50 mg Oral Nightly PRN Larue Ray, APRN - CNP        venlafaxine (EFFEXOR XR) extended release capsule 150 mg  150 mg Oral Daily Larue Ray, APRN - CNP   150 mg at 06/28/22 0935    venlafaxine (EFFEXOR XR) extended release capsule 75 mg  75 mg Oral Daily Larue Ray, APRN - CNP   75 mg at 06/28/22 9226    potassium chloride (KLOR-CON M) extended release tablet 40 mEq  40 mEq Oral PRN Lord Maria De Jesus MD        Or   Warden Reilly potassium bicarb-citric acid (EFFER-K) effervescent tablet 40 mEq  40 mEq Oral PRN Lord Maria De Jesus MD        Or    potassium chloride 10 mEq/100 mL IVPB (Peripheral Line)  10 mEq IntraVENous PRN Lord Maria De Jesus MD        sodium chloride flush 0.9 % injection 10 mL  10 mL IntraVENous PRN Elif A Libby, DO   10 mL at 06/27/22 2118    heparin (porcine) injection 6,750 Units  80 Units/kg IntraVENous PRN Elif A Libby, DO        heparin (porcine) injection 3,380 Units  40 Units/kg IntraVENous PRN Elif A Libby, DO        heparin (porcine) 25,000 Units in dextrose 5 % 250 mL infusion  5-30 Units/kg/hr IntraVENous Continuous Elif A Libby, DO 14.3 mL/hr at 06/28/22 0503 17 Units/kg/hr at 06/28/22 0503       Allergies:  Chantix [varenicline tartrate] and Sulphadimidine [sulfamethazine]    Social History:   reports that she quit smoking about 22 years ago. Her smoking use included cigarettes. She has a 40.00 pack-year smoking history.  She has never used smokeless tobacco. She reports current alcohol use of about 1.0 standard drink of alcohol per week. She reports current drug use. Drug: Marijuana [facility]). Family History: family history includes Other in her father; Stroke in her mother. REVIEW OF SYSTEMS:    Constitutional: No fever or chills. No night sweats, no weight loss   Eyes: No eye discharge, double vision, or eye pain   HEENT: negative for sore mouth, sore throat, hoarseness and voice change   Respiratory: negative for cough , sputum, dyspnea, wheezing, hemoptysis, chest pain   Cardiovascular: negative for chest pain, dyspnea, palpitations, orthopnea, PND   Gastrointestinal: negative for nausea, vomiting, diarrhea, constipation, abdominal pain, Dysphagia, hematemesis and hematochezia   Genitourinary: negative for frequency, dysuria, nocturia, urinary incontinence, and hematuria   Integument: negative for rash, skin lesions, bruises.    Hematologic/Lymphatic: negative for easy bruising, bleeding, lymphadenopathy, or petechiae   Endocrine: negative for heat or cold intolerance,weight changes, change in bowel habits and hair loss   Musculoskeletal: negative for myalgias, arthralgias, pain, joint swelling,and bone pain   Neurological: negative for headaches, dizziness, seizures, weakness, numbness    PHYSICAL EXAM:      BP (!) 117/59   Pulse 65   Temp 98.1 °F (36.7 °C) (Oral)   Resp 18   SpO2 93%    Temp (24hrs), Av.9 °F (36.6 °C), Min:97.6 °F (36.4 °C), Max:98.1 °F (36.7 °C)    General appearance - well appearing, no in pain or distress   Mental status - alert and cooperative   Eyes - pupils equal and reactive, extraocular eye movements intact   Ears - bilateral TM's and external ear canals normal   Mouth - mucous membranes moist, pharynx normal without lesions   Neck - supple, no significant adenopathy   Lymphatics - no palpable lymphadenopathy, no hepatosplenomegaly   Chest - clear to auscultation, no wheezes, rales or rhonchi, symmetric air entry   Heart - normal rate, regular rhythm, normal S1, S2, no murmurs  Abdomen - soft, nontender, nondistended, no masses or organomegaly   Neurological - alert, oriented, normal speech, no focal findings or movement disorder noted   Musculoskeletal - no joint tenderness, deformity or swelling   Extremities - peripheral pulses normal, no pedal edema, no clubbing or cyanosis   Skin - normal coloration and turgor, no rashes, no suspicious skin lesions noted ,    DATA:    Labs:   CBC:   Recent Labs     06/27/22 2005   WBC 11.7*   HGB 11.0*   HCT 34.0*   *     BMP:   Recent Labs     06/27/22 2005 06/28/22  0423   * 132*   K 3.1* 3.3*   CO2 26 28   BUN 7* 6*   CREATININE 0.62 0.56   LABGLOM >60 >60   GLUCOSE 98 132*     PT/INR:   Recent Labs     06/27/22 2005   PROTIME 15.0*   INR 1.2       IMAGING DATA:  CT ABDOMEN PELVIS W IV CONTRAST Additional Contrast? None   Final Result      CT CHEST PULMONARY EMBOLISM W CONTRAST   Final Result      XR CHEST PORTABLE   Final Result   Mild bibasal fibrosis and small right pleural effusion. VL DUP LOWER EXTREMITY VENOUS BILATERAL    (Results Pending)       Primary Problem  Pulmonary embolism on right [facility])    Active Hospital Problems    Diagnosis Date Noted    Pulmonary embolism on right [facility]) [I26.99] 06/27/2022     Priority: Medium    Liver mass [R16.0]      Priority: Medium    Diabetes mellitus type 2 in obese ([facility] [E11.69, E66.9] 02/18/2019    Morbid obesity ([facility] [E66.01] 02/18/2019         IMPRESSION:   1. Metastatic disease with multiple liver lesion possibly pancreatobiliary origin, no biopsy done yet  2. Acute PE  3. Peritoneal carcinomatosis    RECOMMENDATIONS:  1. I reviewed the laboratory data, imaging studies, prior records, discussed diagnosis, prognosis and treatment recommendations  2. Continue heparin drip  3. We will plan for liver biopsy this admission likely tomorrow for tissue diagnosis. She has taken Plavix about 3 days ago  4.  Continue other management as per primary team  5. Patient can be transition to NOAC at discharge with Eliquis  6. We will follow    Discussed with pt and Nurse. Thank you for asking us to see this patient. Satish Mccurdy MD  Hematologist/Medical Oncologist    Cell: [phone]    This note is created with the assistance of a speech recognition program.  While intending to generate a document that actually reflects the content of the visit, the document can still have some errors including those of syntax and sound a like substitutions which may escape proof reading. It such instances, actual meaning can be extrapolated by contextual diversion.

## 2022-06-28 NOTE — CARE COORDINATION
ROXANN left a voicemail for admissions.  Electronically signed by Elif Larsen on 6/28/2022 at 3:43 PM

## 2022-06-28 NOTE — TELEPHONE ENCOUNTER
Name: Jagdeep Don  : 1950  MRN: [id number]    Oncology Navigation Follow-Up Note    Contact Type:  Telephone  Notes: Alba Brittonprateek [address] [facility],  [address], called in alerting writer to pt's case. Capo Presume stated await liver bx to be completed for dx. Capo Presume stated pt given writer's name & contact #. Lauren Randhawa will follow closely & contact pt upon d/c.  Capo Presume stated pt given HCPOA documents to review. Will continue to follow.     Electronically signed by Heidy Dunn RN on 2022 at 3:43 PM

## 2022-06-28 NOTE — PLAN OF CARE
Problem: Safety - Adult  Goal: Free from fall injury  Outcome: Progressing     No falls noted this shift. Patient ambulates with x1 staff assistance without difficulty. Bed kept in low position. Safe environment maintained. Bedside table & call light in reach. Uses call light appropriately when needing assistance. Problem: Pain  Goal: Verbalizes/displays adequate comfort level or baseline comfort level  Outcome: Progressing     Patient given PRN pain medications for pain control.      Problem: ABCDS Injury Assessment  Goal: Absence of physical injury  Outcome: Progressing     Problem: Discharge Planning  Goal: Discharge to home or other facility with appropriate resources  Outcome: Progressing

## 2022-06-28 NOTE — CONSULTS
Palliative Care Inpatient Consult    NAME:  [address] RECORD NUMBER:  [id number]  AGE: 81 y.o. GENDER: female  : 1950  TODAY'S DATE:  2022    Reasons for Consultation:    Provision of information regarding PC and/or hospice philosophies  Complex, time-intensive communication and interdisciplinary psychosocial support  Clarification of goals of care and/or assistance with difficult decision-making  Guidance in regards to resources and transition(s)    Summary: Met with patient 1:1 support and listening. Pt relates overwhelmed and without answers she feels she needs to move forward. Let her know we will get biopsy as soon as possible and once results are back medical oncology can meet with her and develop the plan of care/treatment. Let Neto know about the Oncology Nurse Navigator that will be following her outpatient. Patient's navigator will help her to navigate the health care system, tests, diagnosis and treatments. Gave her contact numbers for palliative care office and oncology nurse navigator. Code Status: Full Code  Members of PC team contributing to this consultation are :  Tray Holt RN    History of Present Illness     The patient is a 81 y.o. Non- / non  female who presents with Fatigue and Abdominal Pain    Referred to Palliative Care by   [x] Physician   [] Nursing  [] Family Request   [] Other:       She was admitted to the primary service for Pulmonary embolism on right ([facility] [I26.99]. Her hospital course has been associated with Pulmonary embolism on [facility]).  The patient has a complicated medical history and has been hospitalized since 2022  6:49 PM.    Active Hospital Problems    Diagnosis Date Noted    Pulmonary embolism on [facility]) [I26.99] 2022     Priority: Medium    Liver mass [R16.0]      Priority: Medium    Diabetes mellitus type 2 in obese ([facility] [E11.69, E66.9] 2019    Morbid obesity ([facility] [E66.01] 02/18/2019       Data        Code Status: Full Code     ADVANCED CARE PLANNING:  Patient has capacity for medical decisions: yes  Health Care Power of : no  Living Will: no     Personal, Social, and Family History  Marital Status: single  Living situation:alone, rents a room in a house, steps are not an issue for patient. Minimal steps. Catherine/Spiritual History:   Not addressed  Psychological Distress: severe  Does patient understand diagnosis/treatment?  yes  Does family/caregiver understand diagnosis/treatment? not asked    Assessment        Palliative Performance Scale:    ___100% Full ambulation; normal activity and work; no evidence of disease; able to do own self care; normal intake; fully conscious  ___90% Full ambulation; normal activity and work; some evidence of disease; able to do own self care; normal intake; fully conscious  ___80% Full ambulation; normal activity with effort; some evidence of disease; able to do own self care; normal or reduced intake; fully conscious  ___70% Ambulation reduced; unable to perform normal job/work; significant disease; able to do own self care; normal or reduced intake; fully conscious  ___60%  Ambulation reduced; cannot do hobbies/housework; significant disease; occasional assist; intake normal or reduced; fully conscious/some confusion  __x_50%  Mainly sit/lie; can't do any work; extensive disease; considerable assist; intake normal or reduced; fully conscious/some confusion  ___40%  Mainly in bed; extensive disease; mainly assist; intake normal or reduced; fully conscious/ some confusion   ___30%  Bed bound; extensive disease; total care; intake reduced; fully conscious/some confusion  ___20%  Bed bound; extensive disease; total care; intake minimal; drowsy/coma  ___10%  Bed bound; extensive disease; total care; mouth care only; drowsy/coma  ___0       Death       Risk Assessments:    Bailey Risk Score: [unfilled]    Readmission Risk Score: 18.6 ( )        1 Year Mortality Risk Score: @1YEARMORTALITYRISK@     Plan        Palliative Interaction: Reviewed admission, updates received from bedside nurse Ashley Cisneros. Sat at bedside with patient and friend. We discussed new situation with the blood clots in lungs and heparin gtt to help that. She is aware that this situation needs to be addressed and we will move forward with biopsy as soon as able. I let her know that medical oncologist will see her in hospital as well. Pt awaiting diagnosis, treatment options and prognosis. Offered ongoing support to patient. Explained what a nurse navigator does. Let her know that Gladys Johnson RN nurse navigator will be reaching out to help support her out patient. Dede Proper palliative care office number and oncology nurse navigator number. Patient not interested in discussing code status or decision makers at this time. Will continue to follow and address as able. Education/support to staff  Education/support to patient  Discharge planning/helping to coordinate care  Communications with primary service  Providing support for coping/adaptation/distress of patient  Continue with current plan of care  Palliative care orders introduced  Validating patient/family distress  Continued communication updates  Recognizing, reflecting, and empathizing with the patient's anticipatory grief  support offered, encouraged her to ask questions, discussed current issue of blood clots in lung. Acknowledged that this may set back ability to do biopsy. Acknowledged importance of taking care of this issue as well. Acknowledged that patient is still unsure of extent of disease and opportunities for treatment. Will continue to support patient, answer questions, active listening. Informed patient that a nurse navigator has been requested by attending and will be reaching out to her. Explained that this nurse will help her navigate the health care system, her testing, appointments, diagnosis.   I updated  nurse navigator Denis Quintero of this admission. Principle Problem/Diagnosis:  Pulmonary embolism on right ([address] [I26.99]    Goals of care evaluation:  The patient goals of care are live longer, improve or maintain function/quality of life, provide comfort care/support/palliation/relieve suffering and remain at home   Goals of care discussed with:    [x] Patient independently    [] Patient and Family    [] Family or Healthcare DPOA independently    [] Unable to discuss with patient, family/DPOA not present    Code Status  Full Code    Other recommendations:  Please call with any palliative questions or concerns. Palliative Care Team is available via perfect serve or via phone - [phone]. Palliative Care will continue to follow Ms. Velázquez's care as needed. Thank you for allowing Palliative Care to participate in the care of Ms. Richi Lazo .     Electronically signed by   Shyla Oro RN  Palliative Care Team  on 6/28/2022 at 1:17 PM    Palliative care office: [phone]

## 2022-06-29 ENCOUNTER — APPOINTMENT (OUTPATIENT)
Dept: INTERVENTIONAL RADIOLOGY/VASCULAR | Age: 72
DRG: 435 | End: 2022-06-29
Payer: MEDICARE

## 2022-06-29 ENCOUNTER — CARE COORDINATION (OUTPATIENT)
Dept: CASE MANAGEMENT | Age: 72
End: 2022-06-29

## 2022-06-29 ENCOUNTER — APPOINTMENT (OUTPATIENT)
Dept: NON INVASIVE DIAGNOSTICS | Age: 72
DRG: 435 | End: 2022-06-29
Payer: MEDICARE

## 2022-06-29 LAB
ANION GAP SERPL CALCULATED.3IONS-SCNC: 9 MMOL/L (ref 9–17)
ANTI-XA UNFRAC HEPARIN: 0.54 IU/L (ref 0.3–0.7)
BUN BLDV-MCNC: 4 MG/DL (ref 8–23)
CALCIUM SERPL-MCNC: 7.9 MG/DL (ref 8.6–10.4)
CHLORIDE BLD-SCNC: 99 MMOL/L (ref 98–107)
CO2: 28 MMOL/L (ref 20–31)
CREAT SERPL-MCNC: 0.65 MG/DL (ref 0.5–0.9)
GFR AFRICAN AMERICAN: >60 ML/MIN
GFR NON-AFRICAN AMERICAN: >60 ML/MIN
GFR SERPL CREATININE-BSD FRML MDRD: ABNORMAL ML/MIN/{1.73_M2}
GLUCOSE BLD-MCNC: 108 MG/DL (ref 65–105)
GLUCOSE BLD-MCNC: 121 MG/DL (ref 65–105)
GLUCOSE BLD-MCNC: 126 MG/DL (ref 70–99)
GLUCOSE BLD-MCNC: 160 MG/DL (ref 65–105)
GLUCOSE BLD-MCNC: 86 MG/DL (ref 65–105)
HCT VFR BLD CALC: 34 % (ref 36–46)
HEMOGLOBIN: 11.2 G/DL (ref 12–16)
LV EF: 50 %
LVEF MODALITY: NORMAL
MAGNESIUM: 1.8 MG/DL (ref 1.6–2.6)
MCH RBC QN AUTO: 29.1 PG (ref 26–34)
MCHC RBC AUTO-ENTMCNC: 32.9 G/DL (ref 31–37)
MCV RBC AUTO: 88.5 FL (ref 80–100)
PDW BLD-RTO: 13.9 % (ref 11.5–14.9)
PLATELET # BLD: 503 K/UL (ref 150–450)
PMV BLD AUTO: 7.4 FL (ref 6–12)
POTASSIUM SERPL-SCNC: 3.5 MMOL/L (ref 3.7–5.3)
RBC # BLD: 3.85 M/UL (ref 4–5.2)
SODIUM BLD-SCNC: 136 MMOL/L (ref 135–144)
WBC # BLD: 10.8 K/UL (ref 3.5–11)

## 2022-06-29 PROCEDURE — 6370000000 HC RX 637 (ALT 250 FOR IP): Performed by: NURSE PRACTITIONER

## 2022-06-29 PROCEDURE — 6360000002 HC RX W HCPCS: Performed by: STUDENT IN AN ORGANIZED HEALTH CARE EDUCATION/TRAINING PROGRAM

## 2022-06-29 PROCEDURE — 85027 COMPLETE CBC AUTOMATED: CPT

## 2022-06-29 PROCEDURE — 36415 COLL VENOUS BLD VENIPUNCTURE: CPT

## 2022-06-29 PROCEDURE — 94640 AIRWAY INHALATION TREATMENT: CPT

## 2022-06-29 PROCEDURE — 6360000002 HC RX W HCPCS: Performed by: RADIOLOGY

## 2022-06-29 PROCEDURE — 76942 ECHO GUIDE FOR BIOPSY: CPT

## 2022-06-29 PROCEDURE — 97162 PT EVAL MOD COMPLEX 30 MIN: CPT

## 2022-06-29 PROCEDURE — 82947 ASSAY GLUCOSE BLOOD QUANT: CPT

## 2022-06-29 PROCEDURE — 88307 TISSUE EXAM BY PATHOLOGIST: CPT

## 2022-06-29 PROCEDURE — 85520 HEPARIN ASSAY: CPT

## 2022-06-29 PROCEDURE — 93306 TTE W/DOPPLER COMPLETE: CPT

## 2022-06-29 PROCEDURE — 88341 IMHCHEM/IMCYTCHM EA ADD ANTB: CPT

## 2022-06-29 PROCEDURE — 80048 BASIC METABOLIC PNL TOTAL CA: CPT

## 2022-06-29 PROCEDURE — 88333 PATH CONSLTJ SURG CYTO XM 1: CPT

## 2022-06-29 PROCEDURE — 2060000000 HC ICU INTERMEDIATE R&B

## 2022-06-29 PROCEDURE — 47000 NEEDLE BIOPSY OF LIVER PERQ: CPT

## 2022-06-29 PROCEDURE — 2580000003 HC RX 258: Performed by: STUDENT IN AN ORGANIZED HEALTH CARE EDUCATION/TRAINING PROGRAM

## 2022-06-29 PROCEDURE — 6360000002 HC RX W HCPCS: Performed by: NURSE PRACTITIONER

## 2022-06-29 PROCEDURE — 94761 N-INVAS EAR/PLS OXIMETRY MLT: CPT

## 2022-06-29 PROCEDURE — 0FB03ZX EXCISION OF LIVER, PERCUTANEOUS APPROACH, DIAGNOSTIC: ICD-10-PCS | Performed by: RADIOLOGY

## 2022-06-29 PROCEDURE — 2709999900 IR BIOPSY LIVER PERCUTANEOUS

## 2022-06-29 PROCEDURE — 88342 IMHCHEM/IMCYTCHM 1ST ANTB: CPT

## 2022-06-29 PROCEDURE — 99233 SBSQ HOSP IP/OBS HIGH 50: CPT | Performed by: INTERNAL MEDICINE

## 2022-06-29 PROCEDURE — 97530 THERAPEUTIC ACTIVITIES: CPT

## 2022-06-29 PROCEDURE — 97116 GAIT TRAINING THERAPY: CPT

## 2022-06-29 PROCEDURE — 97166 OT EVAL MOD COMPLEX 45 MIN: CPT

## 2022-06-29 PROCEDURE — 88334 PATH CONSLTJ SURG CYTO XM EA: CPT

## 2022-06-29 PROCEDURE — 83735 ASSAY OF MAGNESIUM: CPT

## 2022-06-29 RX ORDER — FENTANYL CITRATE 50 UG/ML
INJECTION, SOLUTION INTRAMUSCULAR; INTRAVENOUS
Status: COMPLETED | OUTPATIENT
Start: 2022-06-29 | End: 2022-06-29

## 2022-06-29 RX ORDER — ACETAMINOPHEN 325 MG/1
650 TABLET ORAL EVERY 4 HOURS PRN
Status: DISCONTINUED | OUTPATIENT
Start: 2022-06-29 | End: 2022-06-29

## 2022-06-29 RX ADMIN — TIOTROPIUM BROMIDE INHALATION SPRAY 2 PUFF: 3.12 SPRAY, METERED RESPIRATORY (INHALATION) at 07:16

## 2022-06-29 RX ADMIN — PANTOPRAZOLE SODIUM 40 MG: 40 TABLET, DELAYED RELEASE ORAL at 06:31

## 2022-06-29 RX ADMIN — FENTANYL CITRATE 50 MCG: 50 INJECTION, SOLUTION INTRAMUSCULAR; INTRAVENOUS at 15:02

## 2022-06-29 RX ADMIN — HEPARIN SODIUM 18 UNITS/KG/HR: 10000 INJECTION, SOLUTION INTRAVENOUS; SUBCUTANEOUS at 20:18

## 2022-06-29 RX ADMIN — BUDESONIDE AND FORMOTEROL FUMARATE DIHYDRATE 2 PUFF: 160; 4.5 AEROSOL RESPIRATORY (INHALATION) at 07:17

## 2022-06-29 RX ADMIN — BUDESONIDE AND FORMOTEROL FUMARATE DIHYDRATE 2 PUFF: 160; 4.5 AEROSOL RESPIRATORY (INHALATION) at 19:18

## 2022-06-29 RX ADMIN — VENLAFAXINE HYDROCHLORIDE 75 MG: 75 CAPSULE, EXTENDED RELEASE ORAL at 07:54

## 2022-06-29 RX ADMIN — OXYBUTYNIN CHLORIDE 5 MG: 5 TABLET ORAL at 07:51

## 2022-06-29 RX ADMIN — ATORVASTATIN CALCIUM 80 MG: 80 TABLET, FILM COATED ORAL at 07:50

## 2022-06-29 RX ADMIN — RANOLAZINE 500 MG: 500 TABLET, EXTENDED RELEASE ORAL at 20:20

## 2022-06-29 RX ADMIN — RANOLAZINE 500 MG: 500 TABLET, EXTENDED RELEASE ORAL at 07:49

## 2022-06-29 RX ADMIN — GABAPENTIN 600 MG: 600 TABLET, FILM COATED ORAL at 07:51

## 2022-06-29 RX ADMIN — VENLAFAXINE HYDROCHLORIDE 150 MG: 150 CAPSULE, EXTENDED RELEASE ORAL at 07:49

## 2022-06-29 RX ADMIN — OXYCODONE HYDROCHLORIDE 5 MG: 5 TABLET ORAL at 07:49

## 2022-06-29 RX ADMIN — OXYCODONE HYDROCHLORIDE 5 MG: 5 TABLET ORAL at 19:12

## 2022-06-29 RX ADMIN — GABAPENTIN 600 MG: 600 TABLET, FILM COATED ORAL at 20:20

## 2022-06-29 RX ADMIN — HEPARIN SODIUM 6750 UNITS: 1000 INJECTION INTRAVENOUS; SUBCUTANEOUS at 20:18

## 2022-06-29 RX ADMIN — ARIPIPRAZOLE 5 MG: 5 TABLET ORAL at 07:56

## 2022-06-29 RX ADMIN — OXYBUTYNIN CHLORIDE 5 MG: 5 TABLET ORAL at 20:20

## 2022-06-29 RX ADMIN — MULTIPLE VITAMINS W/ MINERALS TAB 1 TABLET: TAB at 07:51

## 2022-06-29 RX ADMIN — HYDROMORPHONE HYDROCHLORIDE 0.5 MG: 1 INJECTION, SOLUTION INTRAMUSCULAR; INTRAVENOUS; SUBCUTANEOUS at 22:10

## 2022-06-29 RX ADMIN — ALBUTEROL SULFATE 2 PUFF: 90 AEROSOL, METERED RESPIRATORY (INHALATION) at 19:18

## 2022-06-29 ASSESSMENT — PAIN DESCRIPTION - LOCATION
LOCATION: ABDOMEN
LOCATION: ABDOMEN

## 2022-06-29 ASSESSMENT — PAIN SCALES - GENERAL
PAINLEVEL_OUTOF10: 10
PAINLEVEL_OUTOF10: 8

## 2022-06-29 ASSESSMENT — PAIN - FUNCTIONAL ASSESSMENT: PAIN_FUNCTIONAL_ASSESSMENT: ACTIVITIES ARE NOT PREVENTED

## 2022-06-29 ASSESSMENT — PAIN DESCRIPTION - DESCRIPTORS: DESCRIPTORS: ACHING

## 2022-06-29 ASSESSMENT — PAIN DESCRIPTION - ORIENTATION: ORIENTATION: RIGHT;LEFT

## 2022-06-29 NOTE — CARE COORDINATION
Genesis Miller from [facility] reported that she has received referral from patient. [facility] reported that there is only one bed available. Genesis Miller reported that she is going to send information to nursing to review.  Electronically signed by Soraya Navarro on 6/29/2022 at 8:31 AM

## 2022-06-29 NOTE — BRIEF OP NOTE
Brief Postoperative Note    Eben Hendrickson  YOB: 1950  [id number]    Pre-operative Diagnosis: Suspected liver mets    Post-operative Diagnosis: Same    Procedure: Liver biopsy    Medications Given: fentanyl    Anesthesia: Local    Surgeons/Assistants: Joan Luis MD    Estimated Blood Loss: minimal    Complications: none    Specimens: were obtained    Findings: 3 20 g core biopsies from suspected mets rt lobe. Specimen quality confirmed by pathology on site.       Electronically signed by Joan Luis MD on 6/29/2022 at 3:25 PM

## 2022-06-29 NOTE — PROGRESS NOTES
Called attending about holding heparin drip for patient to have liver biopsy, attending said it was okay to hold heparin drip until after biopsy.

## 2022-06-29 NOTE — CARE COORDINATION
[facility] Transitions Follow Up Call    2022    Patient: Beverly Caballero  Patient : 1950    MRN: [id number]  Reason for Admission: Hypokalemia    Discharge Date: 22 RARS: Readmission Risk Score: 18.6 ( )         Spoke with:Called to speak with patient for update with transition of care. Voicemail full unable to leave message will attempt again at later time. Care Transitions Subsequent and Final Call    Subsequent and Final Calls  Care Transitions Interventions  Other Interventions: Follow Up  No future appointments.     Lennox Santana LPN

## 2022-06-29 NOTE — PROGRESS NOTES
LSW notified of discharge plans for tomorrow. He plans to monitor today after liver biopsy.  Electronically signed by Anabel Cherry RN on 6/29/2022 at 10:28 AM

## 2022-06-29 NOTE — PROGRESS NOTES
Physical Therapy  Facility/Department: [address] [place] [address] CARE  Physical Therapy Initial Assessment    Name: Dilia Michael  : 1950  MRN: [id number]  Date of Service: 2022    Discharge Recommendations:  Patient would benefit from continued therapy after discharge          Patient Diagnosis(es): The encounter diagnosis was Other acute pulmonary embolism without acute cor pulmonale ([facility] [address] Past Medical History:  has a past medical history of Acid reflux, Arthritis, Cataracts, bilateral, CHF (congestive heart failure) ([facility] [address] COPD (chronic obstructive pulmonary disease) ([facility] [address] Gall stones, Head injury, Hyperlipidemia, Hypertension, Insomnia, MI (myocardial infarction) ([facility] [address] Neuropathy, Sleep apnea, and Type 2 diabetes mellitus without complication ([facility] [address] Past Surgical History:  has a past surgical history that includes Femur Surgery; Foot surgery (Left); Mandible fracture surgery; Cardiac surgery (); Ankle surgery (Left); Tonsillectomy; and Coronary angioplasty with stent (). Assessment   Assessment: patient is a 82 y/o female presenting to physical therapy due to pulmonary embolism on right. Patient demonstrates BLE ROM and strength that is [facility]. Patient demonstrates bilateral decreased DF/PF ROM due to pain. Pt ambulated [de-identified]' with CGA, and tolerated the treatment well. Pt reports lightheadedness upon standing and when making turns upon ambulation. Per pt she has had multiple falls within the past month. Patient would benefit from skilled physical therapy at this time. Treatment Diagnosis: decreased endurance  Therapy Prognosis: Good  Decision Making: Medium Complexity  Requires PT Follow-Up: Yes  Activity Tolerance  Activity Tolerance: Patient tolerated treatment well;Patient limited by endurance; Patient limited by fatigue     Plan   Plan  Plan: 5-7 times per week  Current Treatment Recommendations: Strengthening,Balance training,Functional mobility training,Transfer training,Endurance training,Gait training,Safety education & training,Patient/Caregiver education & training,Therapeutic activities,Stair training  Safety Devices  Type of Devices: All fall risk precautions in place,Call light within reach,Gait belt,Patient at risk for falls,Left in chair,Nurse notified  Restraints  Restraints Initially in Place: No     Restrictions  Restrictions/Precautions  Restrictions/Precautions: General Precautions,Fall Risk,Bed Alarm  Required Braces or Orthoses?: No  Implants present? : Metal implants (2 screws in L foot)     Subjective   Pain: Pt endorsed 8/10 \"stabbing\" pain in RLQ, worsens with movement  General  Chart Reviewed: Yes  Patient assessed for rehabilitation services?: Yes  Additional Pertinent Hx: Tha Hernandez is a 81 y.o. female who is admitted to the hospital on 6/27/2022  for fatigue and abd pain. Patient was recently admitted to hospital about a week ago and at that time she had a CT scan of the abdomen which showed multiple liver lesions suspicious for metastasis. She was evaluated by oncology. Her tumor markers showed very high CA 19-9 and CEA and AFP. 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 IR guided biopsy was ordered but because patient was on Plavix plan was to do biopsy as outpatient. CT chest abdomen pelvis and CT chest showed small pulmonary emboli in the right upper and right middle lobes with very small clot burden.   Pt to have liver bipsy, oncology following  Family / Caregiver Present: No  Referring Practitioner: Dr. Ivy Naidu  Referral Date : 06/28/22  Diagnosis: pulmonary embolism on right ([address]  Follows Commands: Within Functional Limits         Social/Functional History  Social/Functional History  Lives With: Other (comment) David Chun, always home doesn't work)  [address] of [email or web address] [facility]: One level,Performs ADL's on one level,Able to Live on Main level with bedroom/bathroom  Home Access: Stairs to enter with rails  Entrance Stairs - Number of Steps: 6  Entrance Stairs - Rails: Left  Bathroom Shower/Tub: Walk-in shower,Curtain,Shower chair with back  Bathroom Toilet: Handicap height  Bathroom Equipment: Grab bars in shower  Bathroom Accessibility: Walker accessible  Home Equipment: Cane,Walker, standard,Grab bars  Has the patient had two or more falls in the past year or any fall with injury in the past year?: Yes (8 in last month d/t BLE weakness and dizziness)  Receives Help From: Other (comment) Neri Waters)  ADL Assistance: Independent  Homemaking Assistance: Independent Neri Waters assists with laundry)  Homemaking Responsibilities: Yes  Ambulation Assistance: Independent  Transfer Assistance: Independent  Active : No  Patient's  Info: Sheree Harness  Mode of Transportation: Car  Occupation: Retired  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 [address] [employer]: regular flat bed  Additional Comments: Pt reported that Sheree Harness owns the house and she pays him rent. He is available to assist as needed.    Vision/Hearing  Vision  Vision: Impaired  Vision Exceptions: Wears glasses at all times  Hearing  Hearing: Within functional limits    Cognition   Orientation  Overall Orientation Status: Within Functional Limits  Cognition  Overall Cognitive Status: WFL     Objective   Heart Rate: 72  Heart Rate Source: Monitor  BP: 93/68  BP Location: Right upper arm  BP Method: Automatic  MAP (Calculated): 76.33  Resp: 16  SpO2: 94 %  O2 Device: None (Room air)     Observation/Palpation  Edema: edema present in BLE, L>R  Gross Assessment  Sensation: Impaired (bilateral numbness and tingling of both feet, L>R)     AROM RLE (degrees)  RLE AROM: Exceptions  R Hip Flexion 0-125: WFL  R Knee Flexion 0-145: WFL  R Knee Extension 0: WFL  R Ankle Dorsiflexion 0-20: decreased (due to pain in ankle)  R Ankle Plantar Flexion 0-45: decreased (due to pain in ankle)  AROM LLE (degrees)  LLE AROM : Exceptions  L Hip Flexion 0-125: WFL  L Knee Flexion 0-145: WFL  L Knee Extension 0: WFL  L Ankle Dorsiflexion 0-20: decreased (due to pain in ankle)  L Ankle Plantar Flexion 0-45: decreased (due to pain in ankle)  AROM RUE (degrees)  RUE General AROM: see OT eval  AROM LUE (degrees)  LUE General AROM: see OT eval  Strength RLE  Strength RLE: Exception  R Hip Flexion: 4-/5  R Knee Flexion: 4-/5  R Knee Extension: 4-/5  R Ankle Dorsiflexion: NT (not tested due to pain in ankle)  R Ankle Plantar flexion: NT (not tested due to pain in ankle)  Strength LLE  Strength LLE: Exception  L Hip Flexion: 4-/5  L Knee Flexion: 4-/5  L Knee Extension: 4-/5  L Ankle Dorsiflexion: NT (not tested due to pain in ankle)  L Ankle Plantar Flexion: NT (not tested due to pain in ankle)  Strength RUE  Comment: see OT eval  Strength LUE  Comment: see OT eval  Tone RLE  RLE Tone: WFL  Tone LLE  LLE Tone: WFL  Sensation  Overall Sensation Status: Impaired (pr reports numbness and tingling in both feet)     Bed mobility  Rolling to Right: Stand by assistance  Supine to Sit: Stand by assistance  Scooting: Stand by assistance  Bed Mobility Comments: HOB slightly elevated. Pt complained of mild lightheadedness upon sitting EOB, subsided with deep breathing. Pt up in chair at end of session  Transfers  Sit to Stand: Contact guard assistance  Stand to sit: Contact guard assistance  Comment: bilateral LE elevated on a stool secondary to leg swelling  Ambulation  Surface: level tile  Device: No Device  Assistance: Contact guard assistance  Quality of Gait: pt demonstrates unsteadiness during ambulation which increases during turns, pt has reported multiple falls a home withing the past month, pt with generalized weakness and fatigue noted.    Distance: [de-identified]'  Comments: pt reported lightheadedness/dizziness upon standing and when making turns upon ambulation     Balance  Posture: Fair  Sitting - Static: Good  Sitting - Dynamic: Good  Standing - Static: Fair;+  Standing - Dynamic: Fair;+           OutComes Score AM-PAC Score  AM-PAC Inpatient Mobility Raw Score : 18 (06/29/22 1213)  AM-PAC Inpatient T-Scale Score : 43.63 (06/29/22 1213)  Mobility Inpatient CMS 0-100% Score: 46.58 (06/29/22 1213)  Mobility Inpatient CMS G-Code Modifier : CK (06/29/22 1213)          Goals  Short Term Goals  Time Frame for Short term goals: 1 week  Short term goal 1: pt will perform bed mobility independently to improve function  Short term goal 2: pt will perform sit<>stand transfer with SBA and no device  Short term goal 3: pt will ambulate 150' with SBA to increase endurance  Short term goal 4: pt will perform 10 step ups onto a 6\" platform with handheld assist to safely return home       Education  Patient Education  Education Given To: Patient  Education Provided: Role of Therapy;Plan of Care;Transfer Training; Fall Prevention Strategies  Education Method: Verbal;Demonstration  Barriers to Learning: None  Education Outcome: Verbalized understanding;Demonstrated understanding      Therapy Time   Individual Concurrent Group Co-treatment   Time In 0827         Time Out 0902         Minutes 35         Timed Code Treatment Minutes: 20 Minutes     PT evaluation/treatment is completed by DELANO Downs under the direct supervision of co-signing therapist, who agrees with all documentation and evaluation/treatment.   DELANO Downs

## 2022-06-29 NOTE — PLAN OF CARE
Problem: Discharge Planning  Goal: Discharge to home or other facility with appropriate resources  6/29/2022 1314 by June Lyle RN  Outcome: Progressing  6/29/2022 0526 by Meagan Lopez RN  Outcome: Progressing     Problem: Pain  Goal: Verbalizes/displays adequate comfort level or baseline comfort level  6/29/2022 1314 by June Lyle RN  Outcome: Progressing  6/29/2022 0526 by Meagan Lopez RN  Outcome: Progressing     Problem: Safety - Adult  Goal: Free from fall injury  6/29/2022 1314 by June Lyle RN  Outcome: Progressing  6/29/2022 0526 by Meagan Lopez RN  Outcome: Progressing     Problem: ABCDS Injury Assessment  Goal: Absence of physical injury  6/29/2022 1314 by June Lyle RN  Outcome: Progressing  6/29/2022 0526 by Meagan Lopez RN  Outcome: Progressing     Problem: Chronic Conditions and Co-morbidities  Goal: Patient's chronic conditions and co-morbidity symptoms are monitored and maintained or improved  Outcome: Progressing
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
## 2022-06-29 NOTE — PROGRESS NOTES
Occupational Therapy  Facility/Department: [address] [place] [address]  Occupational Therapy Initial Assessment    Name: Eben Hendrickson  : 1950  MRN: [id number]  Date of Service: 2022    Discharge Recommendations:  Patient would benefit from continued therapy after discharge  OT Equipment Recommendations  Other: TBD       Patient Diagnosis(es): The encounter diagnosis was Other acute pulmonary embolism without acute cor pulmonale ([facility] [address] Past Medical History:  has a past medical history of Acid reflux, Arthritis, Cataracts, bilateral, CHF (congestive heart failure) ([facility] [address] COPD (chronic obstructive pulmonary disease) ([facility] [address] Gall stones, Head injury, Hyperlipidemia, Hypertension, Insomnia, MI (myocardial infarction) ([facility] [address] Neuropathy, Sleep apnea, and Type 2 diabetes mellitus without complication ([facility] [address] Past Surgical History:  has a past surgical history that includes Femur Surgery; Foot surgery (Left); Mandible fracture surgery; Cardiac surgery (); Ankle surgery (Left); Tonsillectomy; and Coronary angioplasty with stent (). Treatment Diagnosis: Impaired self-care status      Assessment   Performance deficits / Impairments: Decreased functional mobility ; Decreased ADL status; Decreased strength;Decreased endurance;Decreased balance;Decreased safe awareness;Decreased high-level IADLs  Treatment Diagnosis: Impaired self-care status  Prognosis: Good  Decision Making: Medium Complexity  REQUIRES OT FOLLOW-UP: Yes  Activity Tolerance  Activity Tolerance: Patient limited by fatigue;Patient Tolerated treatment well        Plan   Plan  Times per Week: 4-5  Current Treatment Recommendations: ROM,Strengthening,Balance training,Functional mobility training,Endurance training,Safety education & training,Patient/Caregiver education & training,Equipment evaluation, education, & procurement,Self-Care / ADL,Home management training     Restrictions  Restrictions/Precautions  Restrictions/Precautions: General Precautions,Fall Risk,Bed Alarm  Required Braces or Orthoses?: No  Implants present? : Metal implants (2 screws in L foot)    Subjective   General  Chart Reviewed: Yes  Patient assessed for rehabilitation services?: Yes  Additional Pertinent Hx: 81 y.o.  female, with a history of choledocholithiasis, COPD, CAD, HTN, liver mass, morbid obesity, and DM type II, who presents with fatigue and abdominal pain. According to patient and EHR, she was discharged from this facility on 6/24 after receiving a new diagnosis of metastatic liver disease and suspicion for Pancreatic cancer. Patient reports that since her returning home, her abdominal pain has been uncontrolled. Reports that part of the problem is that she cannot remember when she takes her medication and therefore is afraid to take additional doses. Patient reports that she is to have a liver biopsy completed; however, her insurance has not yet gotten back to her regarding her prior authorization approval.   Family / Caregiver Present: No  Referring Practitioner: Elwin Aase, MD  Diagnosis: Pumonlary embolus  Subjective  Subjective: \"Well I don't know- I didn't come with an instruction manual\"  General Comment  Comments: Okay for OT/PT evaluation per [employer].    Pt endorsed 8/10 \"stabbing\" pain in RLQ, worsens with movement  Social/Functional History  Social/Functional History  Lives With: Other (comment) Ana Mews, always home doesn't work)  Type of Home: House  Home Layout: One level,Performs ADL's on one level,Able to Live on Main level with bedroom/bathroom  Home Access: Stairs to enter with rails  Entrance Stairs - Number of Steps: 6  Entrance Stairs - Rails: Left  Bathroom Shower/Tub: Walk-in shower,Curtain,Shower chair with back  Bathroom Toilet: Handicap height  Bathroom Equipment: Grab bars in shower  Bathroom Accessibility: Walker accessible  Home Equipment: Cane,Walker, standard,Grab bars  Has the patient had two or more falls in the past year or any fall with injury in the past year?: Yes (8 in last month d/t BLE weakness and dizziness)  Receives Help From: Other (comment) Ilda Allen)  ADL Assistance: Independent  Homemaking Assistance: Independent Ilda Allen assists with laundry)  Homemaking Responsibilities: Yes  Ambulation Assistance: Independent  Transfer Assistance: Independent  Active : No  Patient's  Info: Romero Sutton  Mode of Transportation: Car  Occupation: Retired  Type of Occupation: retail at [employer]: regular flat bed  Additional Comments: Pt reported that Romero Sutton owns the house and she pays him rent. He is available to assist as needed. Objective   Heart Rate: 72  Heart Rate Source: Monitor  BP: 93/68  BP Location: Right upper arm  BP Method: Automatic  MAP (Calculated): 76.33  Resp: 16  SpO2: 94 %  O2 Device: None (Room air)          Observation/Palpation  Edema: Swelling in BLEs  Safety Devices  Type of Devices: All fall risk precautions in place;Call light within reach;Gait belt;Patient at risk for falls; Left in chair;Nurse notified        AROM: Within functional limits  Strength: Within functional limits  Coordination: Within functional limits  Tone: Normal  Sensation: Impaired  ADL  Feeding: Setup  Grooming: Setup  UE Bathing: Stand by assistance  LE Bathing: Minimal assistance  UE Dressing: Stand by assistance  LE Dressing: Minimal assistance  Toileting: Minimal assistance  Additional Comments: ADL scores based on skilled observation and clinical reasoning, unless otherwise noted. Pt donned socks seated EOB, increased time to complete d/t SOB and limited mobility. Tone RUE  RUE Tone: Normotonic  Tone LUE  LUE Tone: Normotonic  Coordination  Movements Are Fluid And Coordinated: Yes     Bed mobility  Rolling to Right: Stand by assistance  Supine to Sit: Stand by assistance  Scooting: Stand by assistance  Bed Mobility Comments: HOB slightly elevated.  Pt complained of mild lightheadedness upon sitting EOB, subsided with deep breathing. Pt up in chair at end of session  Transfers  Sit to stand: Contact guard assistance  Stand to sit: Contact guard assistance     Cognition  Overall Cognitive Status: WFL  Orientation  Overall Orientation Status: Within Functional Limits        Sensation  Overall Sensation Status: Impaired (Numbness/tingling in B feet)         Education Given To: Patient  Education Provided: Role of Therapy;Plan of Care  Education Method: Verbal  Barriers to Learning: None  Education Outcome: Verbalized understanding;Continued education needed  LUE AROM (degrees)  LUE AROM : WFL  Left Hand AROM (degrees)  Left Hand AROM: WFL  RUE AROM (degrees)  RUE AROM : WFL  Right Hand AROM (degrees)  Right Hand AROM: WFL  LUE Strength  L Hand General: 4/5  LUE Strength Comment: Overall 4/5  RUE Strength  R Hand General: 4/5  RUE Strength Comment: Overall 4/5                  G-Code     OutComes Score                                                  AM-PAC Score        AM-[facility] Inpatient Daily Activity Raw Score: 18 (06/29/22 1022)  AM-PAC Inpatient ADL T-Scale Score : 38.66 (06/29/22 1022)  ADL Inpatient CMS 0-100% Score: 46.65 (06/29/22 1022)  ADL Inpatient CMS G-Code Modifier : CK (06/29/22 1022)    Goals  Short Term Goals  Time Frame for Short term goals: By discharge  Short Term Goal 1: Pt will perform BADLs mod I and Good safety  Short Term Goal 2: Pt will perform transfers/functional mobility mod I, using least restrictive device, and Good safety  Short Term Goal 3: Pt will V/D use of energy conservation/work simplifcation techniques to increase safety and independence during self care  Short Term Goal 4: Pt will V/D fall prevention/home safety strategies that are applicable to her daily routine  Short Term Goal 5: Pt will demonstrate improved standing tolerance/balance AEB ability to tolerate standing for 3-5 minutes during 1-2 handed functional tasks with no LOB and mod I.    Short Term Goal 6: Pt will actively participate in 15+ minutes of therapeutic exercise/functional activity to promote safety and independence with self care and mobility       Therapy Time   Individual Concurrent Group Co-treatment   Time In 0827         Time Out 0902         Minutes 35         Timed Code Treatment Minutes: 20 Minutes       Kim Matute OT

## 2022-06-29 NOTE — PROGRESS NOTES
Patient tolerated liver biopsy without distress. Dry dressing to site. Patient returned to room. Nurse updated.

## 2022-06-29 NOTE — PROGRESS NOTES
[facility] 52 Internal Medicine    Progress Note  Chart Reviewed: Yes  Patient assessed for rehabilitation services?: Yes  Additional Pertinent Hx: Maia Love is a 81 y.o. female who is admitted to the hospital on 6/27/2022  for fatigue and abd pain. Patient was recently admitted to hospital about a week ago and at that time she had a CT scan of the abdomen which showed multiple liver lesions suspicious for metastasis. She was evaluated by oncology. Her tumor markers showed very high CA 19-9 and CEA and AFP. 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 IR guided biopsy was ordered but because patient was on Plavix plan was to do biopsy as outpatient. CT chest abdomen pelvis and CT chest showed small pulmonary emboli in the right upper and right middle lobes with very small clot burden. Pt to have liver bipsy, oncology following  Family / Caregiver Present: No  Referring Practitioner: Dr. Uzma Domingo  6/29/2022    10:25 AM    Name:   Maia Love  MRN:     [id number]     [place] Males:      [de-identified]   Room:   2095/2095[facility] Day:  2  Admit Date:  6/27/2022  6:49 PM    PCP:   Harriet Bush MD  Code Status:  Full Code    Subjective:     C/C:   Chief Complaint   Patient presents with    Fatigue    Abdominal Pain     Principal Problem:    Pulmonary embolus ([address]  Active Problems:    Liver mass    Diabetes mellitus type 2 in obese ([address]    Morbid obesity ([address]  Resolved Problems:    * No resolved hospital problems.  *      Patient unfortunately has multiple medical problem, and she is not a very good historian  She has history of coronary artery disease s/p CABG back in 1999, COPD, diabetes, hypertension, patient was recently discharged from the hospital, when she was admitted abdominal pain, hypokalemia  CT abdomen pelvis was done concerning for possible pancreatobiliary malignancy with liver mets   Patient, underwent CT chest in the emergency room, suggestive of PE  On heparin drip  Patient plan for liver biopsy today  Interval history  Patient is feeling better, still having abdominal pain  Had 1 episode of vomiting yesterday  Evaluated by oncologist        Recent Results (from the past 24 hour(s))   POC Glucose Fingerstick    Collection Time: 06/28/22 11:36 AM   Result Value Ref Range    POC Glucose 119 (H) 65 - 105 mg/dL   POC Glucose Fingerstick    Collection Time: 06/28/22  4:21 PM   Result Value Ref Range    POC Glucose 133 (H) 65 - 105 mg/dL   Anti-Xa, Unfractionated Heparin    Collection Time: 06/28/22  4:38 PM   Result Value Ref Range    Anti-XA Unfrac Heparin 0.47 0.30 - 0.70 IU/L   POC Glucose Fingerstick    Collection Time: 06/28/22  7:49 PM   Result Value Ref Range    POC Glucose 151 (H) 65 - 105 mg/dL   CBC    Collection Time: 06/29/22  5:47 AM   Result Value Ref Range    WBC 10.8 3.5 - 11.0 k/uL    RBC 3.85 (L) 4.0 - 5.2 m/uL    Hemoglobin 11.2 (L) 12.0 - 16.0 g/dL    Hematocrit 34.0 (L) 36 - 46 %    MCV 88.5 80 - 100 fL    MCH 29.1 26 - 34 pg    MCHC 32.9 31 - 37 g/dL    RDW 13.9 11.5 - 14.9 %    Platelets 757 (H) 887 - 450 k/uL    MPV 7.4 6.0 - 12.0 fL   Basic Metabolic Panel w/ Reflex to MG    Collection Time: 06/29/22  5:47 AM   Result Value Ref Range    Glucose 126 (H) 70 - 99 mg/dL    BUN 4 (L) 8 - 23 mg/dL    CREATININE 0.65 0.50 - 0.90 mg/dL    Calcium 7.9 (L) 8.6 - 10.4 mg/dL    Sodium 136 135 - 144 mmol/L    Potassium 3.5 (L) 3.7 - 5.3 mmol/L    Chloride 99 98 - 107 mmol/L    CO2 28 20 - 31 mmol/L    Anion Gap 9 9 - 17 mmol/L    GFR Non-African American >60 >60 mL/min    GFR African American >60 >60 mL/min    GFR Comment         Anti-Xa, Unfractionated Heparin    Collection Time: 06/29/22  5:47 AM   Result Value Ref Range    Anti-XA Unfrac Heparin 0.54 0.30 - 0.70 IU/L   Magnesium    Collection Time: 06/29/22  5:47 AM   Result Value Ref Range    Magnesium 1.8 1.6 - 2.6 mg/dL   POC Glucose Fingerstick    Collection Time: 06/29/22  6:24 AM   Result Value Ref Range    POC Glucose 121 (H) 65 - 105 mg/dL     Recent Labs     06/28/22  0644 06/28/22  1136 06/28/22  1621 06/28/22  1949 06/29/22  0624   POCGLU 130* 119* 133* 151* 121*        CT ABDOMEN PELVIS W IV CONTRAST Additional Contrast? None    Result Date: 6/27/2022  EXAMINATION: CTA OF THE CHEST; CT OF THE ABDOMEN AND PELVIS WITH CONTRAST 6/27/2022 6:09 pm; 6/27/2022 6:20 pm TECHNIQUE: CTA of the chest was performed after the administration of intravenous contrast.  Multiplanar reformatted images are provided for review. MIP images are provided for review. Automated exposure control, iterative reconstruction, and/or weight based adjustment of the mA/kV was utilized to reduce the radiation dose to as low as reasonably achievable.; CT of the abdomen and pelvis was performed with the administration of intravenous contrast. Multiplanar reformatted images are provided for review. Automated exposure control, iterative reconstruction, and/or weight based adjustment of the mA/kV was utilized to reduce the radiation dose to as low as reasonably achievable. COMPARISON: None CT abdomen pelvis 06/22/2022 HISTORY: ORDERING SYSTEM PROVIDED HISTORY: elevated D dimer, SOB TECHNOLOGIST PROVIDED HISTORY: elevated D dimer, SOB Decision Support Exception - unselect if not a suspected or confirmed emergency medical condition->Emergency Medical Condition (MA) Reason for Exam: elevated D dimer, SOB Relevant Medical/Surgical History: hx. of smoking and COPD. pt. states \"I was here lst week and they said I have cancer of some kind;not sure what kind yet. \"; ORDERING SYSTEM PROVIDED HISTORY: worsened abd pain, hx metastatic ca TECHNOLOGIST PROVIDED HISTORY: worsened abd pain, hx metastatic ca Decision Support Exception - unselect if not a suspected or confirmed emergency medical condition->Emergency Medical Condition (MA) Reason for Exam: pt. c/o SOB, body pains Relevant Medical/Surgical History: hx. of smoking, COPD. pt. states \"I was ere last week, and they said I have some kind of cancer. \" FINDINGS: Chest: Pulmonary Arteries: Pulmonary arteries are adequately opacified for evaluation. There are small filling defects in the right interlobar artery extending into the posterior segmental and subsegmental right upper lobe branches and to a lesser degree into the segmental branches of the medial segment right middle lobe with overall small clot burden. No saddle embolus. Main pulmonary artery is normal in caliber. Mediastinum: No enlarged mediastinal nodes with a lower paratracheal node anterior to the sumit and some subcarinal nodes which are upper limits of normal in caliber. Morphologically abnormal suspicious right supradiaphragmatic lymph nodes. Cardiomegaly. Three-vessel coronary artery calcifications. No pericardial effusion. There is no acute abnormality of the thoracic aorta. Lungs/pleura: Increased right pleural effusion with increased atelectatic changes in the dependent right lung parenchyma, greatest in the right lower lobe. Minimal left lower lobe atelectasis. Indeterminate 4 mm solid noncalcified nodule in the anterior segment left upper lobe. No pneumothorax or left-sided pleural effusion. Central airways are patent. Soft Tissues/Bones: Degenerative changes without lytic or blastic osseous lesion. Remote median sternotomy which is healed. Calcifications in both breasts with a rim calcified likely oil cyst in the medial right breast.  No axillary adenopathy. Abdomen/Pelvis: Organs: Again noted are innumerable hypodense masses in the liver compatible with hepatic metastases. There is poor delineation of the gallbladder from the adjacent liver masses. There is cholelithiasis as well as choledocholithiasis with intrahepatic and extrahepatic biliary ductal dilatation which is overall similar to 5 days prior.   The abnormal soft tissue attenuation extends toward the gastroduodenal junction with loss of the intervening fat plane. The spleen is normal in size and attenuation. The pancreas is atrophic. No peripancreatic inflammatory changes. Adrenal glands remain normal caliber. Kidneys enhance symmetrically aside from a tiny hypodensity in the lateral upper pole left kidney that is too small to definitively characterize. There is no hydronephrosis. Normal caliber ureters. GI/Bowel: The bowel remains normal in caliber without obstruction. An appendix is not definitively identified. Pelvis: The urinary bladder is normal in appearance. Diminutive postmenopausal female pelvic organs. Peritoneum/Retroperitoneum: No free air. Small volume ascites. Irregular peritoneal soft tissue nodularity is redemonstrated compatible with peritoneal carcinomatosis. Enlarged lymph nodes about the yi hepatis. No gross pelvic adenopathy. Infrarenal abdominal aortic aneurysm measuring up to 3.1 cm on a background of moderate to heavy atherosclerosis. Bones/Soft Tissues: Degenerative changes predominating in the mid to lower lumbar facets. No lytic or blastic osseous lesion. Small fat containing umbilical hernia with a peritoneal nodule at the hernia orifice. Mild anasarca. [CHEST Small pulmonary emboli in the right upper and right middle lobes with an overall small clot burden. Increased right pleural effusion and associated atelectasis of the right lung. Indeterminate 4 mm nodule in the left upper lobe which will warrant attention on follow-up in this patient with findings of intra-abdominal malignancy. Morphologically abnormal appearing supradiaphragmatic lymph nodes on the right suspicious for possible intrathoracic metastatic josh spread. ABDOMEN/PELVIS Findings in the abdomen and pelvis are overall not substantially changed from 5 days prior. Diffuse hepatic metastases with loss of the fat plane between the liver and the gastroduodenal junction worrisome for possible direct extension.  Poor delineation of the gallbladder either due to extension of the hepatic disease or possibly due to primary biliary neoplasm. Cholelithiasis and choledocholithiasis with intrahepatic and extrahepatic biliary ductal dilatation, unchanged from recent prior. Peritoneal carcinomatosis and small volume presumably malignant ascites. Infrarenal abdominal aortic aneurysm spanning up to 3.1 cm with recommendations below. Critical results were called by Dr. Scout Schreiber to Dr. Estefanía Breen on 6/27/2022 at 10:22 PM EST. RECOMMENDATIONS: 3.1 cm infrarenal abdominal aortic aneurysm. Recommend follow-up every 3 years. Reference: J Am Vish Radiol 2134;22:168-359. XR CHEST PORTABLE    Result Date: 6/27/2022  EXAMINATION: ONE XRAY VIEW OF THE CHEST 6/27/2022 4:39 pm COMPARISON: 03/15/2010 HISTORY: ORDERING SYSTEM PROVIDED HISTORY: SOB, CP TECHNOLOGIST PROVIDED HISTORY: SOB, CP Reason for Exam: PT CO cough with SOB and CP X several days. FINDINGS: Postsurgical changes overlying the mediastinum. The cardiac size is normal. Mild bibasal fibrosis and small right pleural effusion. .  Pulmonary vascularity appears normal. There is mild ectasia of the thoracic aorta. There are degenerative changes in the spine . No acute bony abnormalities. The hilar structures are normal.     Mild bibasal fibrosis and small right pleural effusion. CT CHEST PULMONARY EMBOLISM W CONTRAST    Result Date: 6/27/2022  EXAMINATION: CTA OF THE CHEST; CT OF THE ABDOMEN AND PELVIS WITH CONTRAST 6/27/2022 6:09 pm; 6/27/2022 6:20 pm TECHNIQUE: CTA of the chest was performed after the administration of intravenous contrast.  Multiplanar reformatted images are provided for review. MIP images are provided for review.  Automated exposure control, iterative reconstruction, and/or weight based adjustment of the mA/kV was utilized to reduce the radiation dose to as low as reasonably achievable.; CT of the abdomen and pelvis was performed with the administration of intravenous contrast. Multiplanar reformatted images are provided for review. Automated exposure control, iterative reconstruction, and/or weight based adjustment of the mA/kV was utilized to reduce the radiation dose to as low as reasonably achievable. COMPARISON: None CT abdomen pelvis 06/22/2022 HISTORY: ORDERING SYSTEM PROVIDED HISTORY: elevated D dimer, SOB TECHNOLOGIST PROVIDED HISTORY: elevated D dimer, SOB Decision Support Exception - unselect if not a suspected or confirmed emergency medical condition->Emergency Medical Condition (MA) Reason for Exam: elevated D dimer, SOB Relevant Medical/Surgical History: hx. of smoking and COPD. pt. states \"I was here lst week and they said I have cancer of some kind;not sure what kind yet. \"; ORDERING SYSTEM PROVIDED HISTORY: worsened abd pain, hx metastatic ca TECHNOLOGIST PROVIDED HISTORY: worsened abd pain, hx metastatic ca Decision Support Exception - unselect if not a suspected or confirmed emergency medical condition->Emergency Medical Condition (MA) Reason for Exam: pt. c/o SOB, body pains Relevant Medical/Surgical History: hx. of smoking, COPD. pt. states \"I was ere last week, and they said I have some kind of cancer. \" FINDINGS: Chest: Pulmonary Arteries: Pulmonary arteries are adequately opacified for evaluation. There are small filling defects in the right interlobar artery extending into the posterior segmental and subsegmental right upper lobe branches and to a lesser degree into the segmental branches of the medial segment right middle lobe with overall small clot burden. No saddle embolus. Main pulmonary artery is normal in caliber. Mediastinum: No enlarged mediastinal nodes with a lower paratracheal node anterior to the sumit and some subcarinal nodes which are upper limits of normal in caliber. Morphologically abnormal suspicious right supradiaphragmatic lymph nodes. Cardiomegaly. Three-vessel coronary artery calcifications. No pericardial effusion.   There is no acute abnormality of the thoracic aorta. Lungs/pleura: Increased right pleural effusion with increased atelectatic changes in the dependent right lung parenchyma, greatest in the right lower lobe. Minimal left lower lobe atelectasis. Indeterminate 4 mm solid noncalcified nodule in the anterior segment left upper lobe. No pneumothorax or left-sided pleural effusion. Central airways are patent. Soft Tissues/Bones: Degenerative changes without lytic or blastic osseous lesion. Remote median sternotomy which is healed. Calcifications in both breasts with a rim calcified likely oil cyst in the medial right breast.  No axillary adenopathy. Abdomen/Pelvis: Organs: Again noted are innumerable hypodense masses in the liver compatible with hepatic metastases. There is poor delineation of the gallbladder from the adjacent liver masses. There is cholelithiasis as well as choledocholithiasis with intrahepatic and extrahepatic biliary ductal dilatation which is overall similar to 5 days prior. The abnormal soft tissue attenuation extends toward the gastroduodenal junction with loss of the intervening fat plane. The spleen is normal in size and attenuation. The pancreas is atrophic. No peripancreatic inflammatory changes. Adrenal glands remain normal caliber. Kidneys enhance symmetrically aside from a tiny hypodensity in the lateral upper pole left kidney that is too small to definitively characterize. There is no hydronephrosis. Normal caliber ureters. GI/Bowel: The bowel remains normal in caliber without obstruction. An appendix is not definitively identified. Pelvis: The urinary bladder is normal in appearance. Diminutive postmenopausal female pelvic organs. Peritoneum/Retroperitoneum: No free air. Small volume ascites. Irregular peritoneal soft tissue nodularity is redemonstrated compatible with peritoneal carcinomatosis. Enlarged lymph nodes about the yi hepatis. No gross pelvic adenopathy.   Infrarenal abdominal aortic aneurysm measuring up to 3.1 cm on a background of moderate to heavy atherosclerosis. Bones/Soft Tissues: Degenerative changes predominating in the mid to lower lumbar facets. No lytic or blastic osseous lesion. Small fat containing umbilical hernia with a peritoneal nodule at the hernia orifice. Mild anasarca. [CHEST Small pulmonary emboli in the right upper and right middle lobes with an overall small clot burden. Increased right pleural effusion and associated atelectasis of the right lung. Indeterminate 4 mm nodule in the left upper lobe which will warrant attention on follow-up in this patient with findings of intra-abdominal malignancy. Morphologically abnormal appearing supradiaphragmatic lymph nodes on the right suspicious for possible intrathoracic metastatic josh spread. ABDOMEN/PELVIS Findings in the abdomen and pelvis are overall not substantially changed from 5 days prior. Diffuse hepatic metastases with loss of the fat plane between the liver and the gastroduodenal junction worrisome for possible direct extension. Poor delineation of the gallbladder either due to extension of the hepatic disease or possibly due to primary biliary neoplasm. Cholelithiasis and choledocholithiasis with intrahepatic and extrahepatic biliary ductal dilatation, unchanged from recent prior. Peritoneal carcinomatosis and small volume presumably malignant ascites. Infrarenal abdominal aortic aneurysm spanning up to 3.1 cm with recommendations below. Critical results were called by Dr. Alyssa Cummings to Dr. Roberto Allen on 6/27/2022 at 10:22 PM EST. RECOMMENDATIONS: 3.1 cm infrarenal abdominal aortic aneurysm. Recommend follow-up every 3 years. Reference: J Am Vish Radiol 5362;50:[id number].      VL DUP LOWER EXTREMITY VENOUS BILATERAL    Result Date: 6/28/2022    [facility]  Vascular Lower Extremities DVT Study Procedure   Patient Name    [facility]  Date of Study             06/28/2022                  [facility]   Date of Diameters are measured in cm Right Lower Extremities DVT Study Measurements Right 2D Measurements +------------------------------------+----------+---------------+----------+ ! Location                            ! Visualized! Compressibility! Thrombosis! +------------------------------------+----------+---------------+----------+ ! Common Femoral                      !Yes       ! Yes            ! None      ! +------------------------------------+----------+---------------+----------+ ! Prox Femoral                        !Yes       ! Yes            ! None      ! +------------------------------------+----------+---------------+----------+ ! Mid Femoral                         !Yes       ! Yes            ! None      ! +------------------------------------+----------+---------------+----------+ ! Dist Femoral                        !Yes       ! Yes            ! None      ! +------------------------------------+----------+---------------+----------+ ! Popliteal                           !Yes       ! Yes            ! None      ! +------------------------------------+----------+---------------+----------+ ! Sapheno Femoral Junction            ! Yes       ! Yes            ! None      ! +------------------------------------+----------+---------------+----------+ ! PTV                                 ! No        !               !          ! +------------------------------------+----------+---------------+----------+ ! Peroneal                            !No        !               !          ! +------------------------------------+----------+---------------+----------+ ! Gastroc                             ! Yes       ! Yes            ! None      ! +------------------------------------+----------+---------------+----------+ ! GSV Thigh                           ! Yes       ! Yes            ! None      ! +------------------------------------+----------+---------------+----------+ ! GSJACINTA Knee                            ! Yes       ! Yes            ! None      ! +------------------------------------+----------+---------------+----------+ ! GSV Ankle                           ! Yes       ! Yes            ! None      ! +------------------------------------+----------+---------------+----------+ ! SSV                                 ! Yes       ! Yes            ! None      ! +------------------------------------+----------+---------------+----------+ Right Doppler Measurements +---------------------------+------+------+--------------------------------+ ! Location                   ! Signal!Reflux! Reflux (msec)                   ! +---------------------------+------+------+--------------------------------+ ! Common Femoral             !Phasic!      !                                ! +---------------------------+------+------+--------------------------------+ ! Prox Femoral               !Phasic!      !                                ! +---------------------------+------+------+--------------------------------+ ! Popliteal                  !Phasic!      !                                ! +---------------------------+------+------+--------------------------------+ Left Lower Extremities DVT Study Measurements Left 2D Measurements +------------------------------------+----------+---------------+----------+ ! Location                            ! Visualized! Compressibility! Thrombosis! +------------------------------------+----------+---------------+----------+ ! Common Femoral                      !Yes       ! Yes            ! None      ! +------------------------------------+----------+---------------+----------+ ! Prox Femoral                        !Yes       ! Yes            ! None      ! +------------------------------------+----------+---------------+----------+ ! Mid Femoral                         !Yes       ! Yes            ! None      ! +------------------------------------+----------+---------------+----------+ ! Dist Femoral                        !Yes       ! Yes            ! None      ! +------------------------------------+----------+---------------+----------+ ! Popliteal                           !Yes       ! Yes            ! None      ! +------------------------------------+----------+---------------+----------+ ! Sapheno Femoral Junction            ! Yes       ! Yes            ! None      ! +------------------------------------+----------+---------------+----------+ ! PTV                                 ! No        !               !          ! +------------------------------------+----------+---------------+----------+ ! Peroneal                            !No        !               !          ! +------------------------------------+----------+---------------+----------+ ! Gastroc                             ! Yes       ! Yes            ! None      ! +------------------------------------+----------+---------------+----------+ ! GSV Thigh                           ! Yes       ! Yes            ! None      ! +------------------------------------+----------+---------------+----------+ ! GSV Knee                            ! Yes       ! Yes            ! None      ! +------------------------------------+----------+---------------+----------+ ! GSV Ankle                           ! Yes       ! Yes            ! None      ! +------------------------------------+----------+---------------+----------+ ! SSV                                 ! Yes       ! Yes            ! None      ! +------------------------------------+----------+---------------+----------+ Left Doppler Measurements +---------------------------+------+------+--------------------------------+ ! Location                   ! Signal!Reflux! Reflux (msec)                   ! +---------------------------+------+------+--------------------------------+ ! Common Femoral             !Phasic!      !                                ! +---------------------------+------+------+--------------------------------+ ! Prox Femoral               !Phasic!      ! ! +---------------------------+------+------+--------------------------------+ ! Popliteal                  !Phasic!      !                                ! +---------------------------+------+------+--------------------------------+            On admission         Review of Systems:     As recorded in HPI          Physical Examination:        Vitals:    06/29/22 0045 06/29/22 0645 06/29/22 0717 06/29/22 0749   BP: (!) 102/57 93/68  93/68   Pulse: 65 61 72 72   Resp: 18 18 16 16   Temp: 98.2 °F (36.8 °C) 97.9 °F (36.6 °C)     TempSrc: Oral Oral     SpO2: 93% 91% 94%    Weight: 199 lb 15.3 oz (90.7 kg)      Height:           Recent Labs     06/28/22  1136 06/28/22  1621 06/28/22  1949 06/29/22  0624   POCGLU 119* 133* 151* 121*       Intake/Output Summary (Last 24 hours) at 6/29/2022 1025  Last data filed at 6/29/2022 0802  Gross per 24 hour   Intake 1425 ml   Output --   Net 1425 ml       General Appearance:  alert, well appearing, and in no acute distress, central obesity present  Mental status:   Head:  normocephalic, atraumatic. Eye: no icterus, redness, pupils equal and reactive, extraocular eye movements intact, conjunctiva clear  Ear: normal external ear, no discharge, hearing intact  Nose:  no drainage noted  Mouth: mucous membranes moist  Neck: supple, no carotid bruits, thyroid not palpable  Lungs: Bilateral equal air entry, clear to ausculation, no wheezing, rales or rhonchi, normal effort  Cardiovascular: normal rate, regular rhythm, no murmur, gallop, rub.   Abdomen: Soft, tenderness in right upper quadrant, epigastric area, liver palpable under right rib cage  Neurologic: There are no new focal motor or sensory deficits,   Skin: No gross lesions, rashes, bruising or bleeding on exposed skin area  Extremities:  peripheral pulses palpable, no pedal edema or calf pain with palpation  Psych:             Data:     PLabs:    BMP:   Recent Labs     06/28/22  0423 06/29/22  0547   * 136   K 3.3* 3.5*   CO2 28 28   BUN 6* 4*   CREATININE 0.56 0.65   LABGLOM >60 >60   GLUCOSE 132* 126*                 Medications: Allergies: Allergies   Allergen Reactions    Chantix [Varenicline Tartrate]      Nightmares     Sulphadimidine [Sulfamethazine]        Current Meds:   Scheduled Meds:    sodium chloride flush  5-40 mL IntraVENous 2 times per day    ARIPiprazole  5 mg Oral Daily    atorvastatin  80 mg Oral Daily    metoprolol succinate  50 mg Oral Daily    budesonide-formoterol  2 puff Inhalation BID    fluticasone  2 spray Nasal Daily    gabapentin  600 mg Oral TID    lisinopril  10 mg Oral Daily    therapeutic multivitamin-minerals  1 tablet Oral Daily    pantoprazole  40 mg Oral QAM AC    oxybutynin  5 mg Oral BID    ranolazine  500 mg Oral BID    tiotropium  2 puff Inhalation Daily    venlafaxine  150 mg Oral Daily    venlafaxine  75 mg Oral Daily     Continuous Infusions:    sodium chloride      sodium chloride 75 mL/hr at 06/28/22 0112    heparin (PORCINE) Infusion Stopped (06/29/22 0723)     PRN Meds: sodium chloride flush, sodium chloride, magnesium hydroxide, acetaminophen **OR** acetaminophen, albuterol sulfate HFA, melatonin, oxyCODONE, traZODone, potassium chloride **OR** potassium alternative oral replacement **OR** potassium chloride, perflutren lipid microspheres, sodium chloride flush, heparin (porcine), heparin (porcine)          Assessment:        Primary Problem  Pulmonary embolus (HCC)    Principal Problem:    Pulmonary embolus (HCC)  Active Problems:    Liver mass    Diabetes mellitus type 2 in obese (HCC)    Morbid obesity ([facility] [address]  Resolved Problems:    * No resolved hospital problems.  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 Rona Dawson yesterday  Patient, will have liver biopsy today, likely start on Eliquis tomorrow  Will be on Plavix and Eliquis long-term, will avoid triple therapy  Patient will need placement   . Hospital Problems           Last Modified POA    * (Principal) Pulmonary embolus ([address] 6/28/2022 Yes    Liver mass 6/28/2022 Yes    Diabetes mellitus type 2 in obese [facility]) 6/28/2022 Yes    Morbid obesity ([address] 6/28/2022 Yes                         Thanks for consulting us . Will monitor vitals and clinical course , and  Optimize therapy  as needed .            Quincy Rodrigues MD  6/29/2022

## 2022-06-29 NOTE — CARE COORDINATION
DISCHARGE PLANNING NOTE:  Pt to have liver biopsy today. SW following for [facility] Palliative care following.    Electronically signed by Dina Thompson RN on 6/29/2022 at 1:33 PM

## 2022-06-29 NOTE — FLOWSHEET NOTE
06/29/22 1518   Encounter Summary   Encounter Overview/Reason  Attempted Encounter  (patient not in her room)

## 2022-06-30 ENCOUNTER — CARE COORDINATION (OUTPATIENT)
Dept: CASE MANAGEMENT | Age: 72
End: 2022-06-30

## 2022-06-30 LAB
ANION GAP SERPL CALCULATED.3IONS-SCNC: 11 MMOL/L (ref 9–17)
ANTI-XA UNFRAC HEPARIN: 0.53 IU/L (ref 0.3–0.7)
ANTI-XA UNFRAC HEPARIN: 0.68 IU/L (ref 0.3–0.7)
BUN BLDV-MCNC: 4 MG/DL (ref 8–23)
CALCIUM SERPL-MCNC: 7.9 MG/DL (ref 8.6–10.4)
CHLORIDE BLD-SCNC: 99 MMOL/L (ref 98–107)
CO2: 26 MMOL/L (ref 20–31)
CREAT SERPL-MCNC: 0.61 MG/DL (ref 0.5–0.9)
GFR AFRICAN AMERICAN: >60 ML/MIN
GFR NON-AFRICAN AMERICAN: >60 ML/MIN
GFR SERPL CREATININE-BSD FRML MDRD: ABNORMAL ML/MIN/{1.73_M2}
GLUCOSE BLD-MCNC: 108 MG/DL (ref 65–105)
GLUCOSE BLD-MCNC: 114 MG/DL (ref 65–105)
GLUCOSE BLD-MCNC: 133 MG/DL (ref 65–105)
GLUCOSE BLD-MCNC: 133 MG/DL (ref 70–99)
GLUCOSE BLD-MCNC: 137 MG/DL (ref 65–105)
MAGNESIUM: 1.6 MG/DL (ref 1.6–2.6)
POTASSIUM SERPL-SCNC: 3.4 MMOL/L (ref 3.7–5.3)
SODIUM BLD-SCNC: 136 MMOL/L (ref 135–144)
TROPONIN, HIGH SENSITIVITY: 12 NG/L (ref 0–14)
TROPONIN, HIGH SENSITIVITY: 14 NG/L (ref 0–14)

## 2022-06-30 PROCEDURE — 6370000000 HC RX 637 (ALT 250 FOR IP): Performed by: INTERNAL MEDICINE

## 2022-06-30 PROCEDURE — 2580000003 HC RX 258: Performed by: NURSE PRACTITIONER

## 2022-06-30 PROCEDURE — 94760 N-INVAS EAR/PLS OXIMETRY 1: CPT

## 2022-06-30 PROCEDURE — 93005 ELECTROCARDIOGRAM TRACING: CPT | Performed by: INTERNAL MEDICINE

## 2022-06-30 PROCEDURE — 97530 THERAPEUTIC ACTIVITIES: CPT

## 2022-06-30 PROCEDURE — 97116 GAIT TRAINING THERAPY: CPT

## 2022-06-30 PROCEDURE — 85520 HEPARIN ASSAY: CPT

## 2022-06-30 PROCEDURE — 82947 ASSAY GLUCOSE BLOOD QUANT: CPT

## 2022-06-30 PROCEDURE — 6360000002 HC RX W HCPCS: Performed by: NURSE PRACTITIONER

## 2022-06-30 PROCEDURE — 36415 COLL VENOUS BLD VENIPUNCTURE: CPT

## 2022-06-30 PROCEDURE — 6370000000 HC RX 637 (ALT 250 FOR IP): Performed by: NURSE PRACTITIONER

## 2022-06-30 PROCEDURE — 84484 ASSAY OF TROPONIN QUANT: CPT

## 2022-06-30 PROCEDURE — 80048 BASIC METABOLIC PNL TOTAL CA: CPT

## 2022-06-30 PROCEDURE — 94640 AIRWAY INHALATION TREATMENT: CPT

## 2022-06-30 PROCEDURE — 2060000000 HC ICU INTERMEDIATE R&B

## 2022-06-30 PROCEDURE — 83735 ASSAY OF MAGNESIUM: CPT

## 2022-06-30 PROCEDURE — 99239 HOSP IP/OBS DSCHRG MGMT >30: CPT | Performed by: INTERNAL MEDICINE

## 2022-06-30 RX ORDER — LANOLIN ALCOHOL/MO/W.PET/CERES
6 CREAM (GRAM) TOPICAL NIGHTLY PRN
Status: DISCONTINUED | OUTPATIENT
Start: 2022-06-30 | End: 2022-07-05 | Stop reason: HOSPADM

## 2022-06-30 RX ORDER — GABAPENTIN 600 MG/1
600 TABLET ORAL 3 TIMES DAILY
Qty: 9 TABLET | Refills: 0 | Status: SHIPPED | OUTPATIENT
Start: 2022-06-30 | End: 2022-07-05 | Stop reason: HOSPADM

## 2022-06-30 RX ORDER — CLOPIDOGREL BISULFATE 75 MG/1
75 TABLET ORAL DAILY
Status: DISCONTINUED | OUTPATIENT
Start: 2022-06-30 | End: 2022-07-05 | Stop reason: HOSPADM

## 2022-06-30 RX ORDER — CLOPIDOGREL BISULFATE 75 MG/1
75 TABLET ORAL DAILY
Qty: 30 TABLET | Refills: 3 | Status: SHIPPED | OUTPATIENT
Start: 2022-06-30

## 2022-06-30 RX ADMIN — ARIPIPRAZOLE 5 MG: 5 TABLET ORAL at 09:31

## 2022-06-30 RX ADMIN — VENLAFAXINE HYDROCHLORIDE 75 MG: 75 CAPSULE, EXTENDED RELEASE ORAL at 09:34

## 2022-06-30 RX ADMIN — RANOLAZINE 500 MG: 500 TABLET, EXTENDED RELEASE ORAL at 09:31

## 2022-06-30 RX ADMIN — Medication 6 MG: at 23:16

## 2022-06-30 RX ADMIN — FLUTICASONE PROPIONATE 2 SPRAY: 50 SPRAY, METERED NASAL at 09:33

## 2022-06-30 RX ADMIN — GABAPENTIN 600 MG: 600 TABLET, FILM COATED ORAL at 13:39

## 2022-06-30 RX ADMIN — MULTIPLE VITAMINS W/ MINERALS TAB 1 TABLET: TAB at 09:31

## 2022-06-30 RX ADMIN — CLOPIDOGREL BISULFATE 75 MG: 75 TABLET ORAL at 10:14

## 2022-06-30 RX ADMIN — TIOTROPIUM BROMIDE INHALATION SPRAY 2 PUFF: 3.12 SPRAY, METERED RESPIRATORY (INHALATION) at 08:58

## 2022-06-30 RX ADMIN — VENLAFAXINE HYDROCHLORIDE 150 MG: 150 CAPSULE, EXTENDED RELEASE ORAL at 09:31

## 2022-06-30 RX ADMIN — SODIUM CHLORIDE, PRESERVATIVE FREE 10 ML: 5 INJECTION INTRAVENOUS at 22:37

## 2022-06-30 RX ADMIN — ATORVASTATIN CALCIUM 80 MG: 80 TABLET, FILM COATED ORAL at 09:31

## 2022-06-30 RX ADMIN — LISINOPRIL 10 MG: 10 TABLET ORAL at 09:31

## 2022-06-30 RX ADMIN — OXYBUTYNIN CHLORIDE 5 MG: 5 TABLET ORAL at 22:31

## 2022-06-30 RX ADMIN — RANOLAZINE 500 MG: 500 TABLET, EXTENDED RELEASE ORAL at 22:31

## 2022-06-30 RX ADMIN — SODIUM CHLORIDE: 9 INJECTION, SOLUTION INTRAVENOUS at 04:10

## 2022-06-30 RX ADMIN — BUDESONIDE AND FORMOTEROL FUMARATE DIHYDRATE 2 PUFF: 160; 4.5 AEROSOL RESPIRATORY (INHALATION) at 20:21

## 2022-06-30 RX ADMIN — GABAPENTIN 600 MG: 600 TABLET, FILM COATED ORAL at 09:31

## 2022-06-30 RX ADMIN — APIXABAN 10 MG: 5 TABLET, FILM COATED ORAL at 10:14

## 2022-06-30 RX ADMIN — APIXABAN 10 MG: 5 TABLET, FILM COATED ORAL at 22:32

## 2022-06-30 RX ADMIN — HYDROMORPHONE HYDROCHLORIDE 0.5 MG: 1 INJECTION, SOLUTION INTRAMUSCULAR; INTRAVENOUS; SUBCUTANEOUS at 16:12

## 2022-06-30 RX ADMIN — PANTOPRAZOLE SODIUM 40 MG: 40 TABLET, DELAYED RELEASE ORAL at 09:31

## 2022-06-30 RX ADMIN — HYDROMORPHONE HYDROCHLORIDE 0.5 MG: 1 INJECTION, SOLUTION INTRAMUSCULAR; INTRAVENOUS; SUBCUTANEOUS at 09:31

## 2022-06-30 RX ADMIN — BUDESONIDE AND FORMOTEROL FUMARATE DIHYDRATE 2 PUFF: 160; 4.5 AEROSOL RESPIRATORY (INHALATION) at 08:59

## 2022-06-30 RX ADMIN — OXYBUTYNIN CHLORIDE 5 MG: 5 TABLET ORAL at 09:31

## 2022-06-30 RX ADMIN — HYDROMORPHONE HYDROCHLORIDE 0.5 MG: 1 INJECTION, SOLUTION INTRAMUSCULAR; INTRAVENOUS; SUBCUTANEOUS at 22:37

## 2022-06-30 RX ADMIN — SODIUM CHLORIDE, PRESERVATIVE FREE 10 ML: 5 INJECTION INTRAVENOUS at 09:34

## 2022-06-30 RX ADMIN — GABAPENTIN 600 MG: 600 TABLET, FILM COATED ORAL at 22:31

## 2022-06-30 RX ADMIN — METOPROLOL SUCCINATE 50 MG: 50 TABLET, EXTENDED RELEASE ORAL at 09:31

## 2022-06-30 ASSESSMENT — PAIN DESCRIPTION - DESCRIPTORS
DESCRIPTORS: ACHING;CRAMPING
DESCRIPTORS: STABBING
DESCRIPTORS: ACHING;CRAMPING;CRUSHING
DESCRIPTORS: ACHING

## 2022-06-30 ASSESSMENT — PAIN SCALES - GENERAL
PAINLEVEL_OUTOF10: 10
PAINLEVEL_OUTOF10: 8
PAINLEVEL_OUTOF10: 7
PAINLEVEL_OUTOF10: 10
PAINLEVEL_OUTOF10: 0

## 2022-06-30 ASSESSMENT — PAIN DESCRIPTION - LOCATION
LOCATION: ABDOMEN
LOCATION: CHEST
LOCATION: ABDOMEN
LOCATION: ABDOMEN

## 2022-06-30 ASSESSMENT — PAIN - FUNCTIONAL ASSESSMENT
PAIN_FUNCTIONAL_ASSESSMENT: ACTIVITIES ARE NOT PREVENTED

## 2022-06-30 ASSESSMENT — PAIN DESCRIPTION - ONSET: ONSET: GRADUAL

## 2022-06-30 ASSESSMENT — PAIN DESCRIPTION - ORIENTATION: ORIENTATION: RIGHT;LEFT

## 2022-06-30 ASSESSMENT — PAIN DESCRIPTION - FREQUENCY: FREQUENCY: INTERMITTENT

## 2022-06-30 ASSESSMENT — PAIN DESCRIPTION - PAIN TYPE: TYPE: ACUTE PAIN

## 2022-06-30 NOTE — FLOWSHEET NOTE
06/29/22 2140   Treatment Team Notification   Reason for Communication Review case   Team Member Name Danish Sykes NP   Treatment Team Role Advanced Practice Nurse   Method of Communication Secure Message   Response Waiting for response   Notification Time 2142   Pt requesting something different for pain. States pain is still a 10/10. On call NP notified.  See new orders

## 2022-06-30 NOTE — CARE COORDINATION
[facility] reported that they will be willing to accept patient. Miguel Valverde reported that authorization will be started for patient to go to facility.  Electronically signed by Kamaljit Breen on 6/30/2022 at 12:21 PM

## 2022-06-30 NOTE — PROGRESS NOTES
[place] [address] [facility]    Progress Note     6/30/2022    9:50 AM    Name:   Cristofer Dawson  MRN:     [id number]     Acct:      [de-identified]   Room:   2095/2095-01   Day:  3  Admit Date:  6/27/2022  6:49 PM    PCP:   Og France MD  Code Status:  Full Code    Subjective:     C/C:   Chief Complaint   Patient presents with    Fatigue    Abdominal Pain     Principal Problem:    Pulmonary embolus ([address]  Active Problems:    Liver mass    Diabetes mellitus type 2 in obese ([address]    Morbid obesity ([address]  Resolved Problems:    * No resolved hospital problems.  *      Patient unfortunately has multiple medical problem, and she is not a very good historian  She has history of coronary artery disease s/p CABG back in 1999, COPD, diabetes, hypertension, patient was recently discharged from the hospital, when she was admitted abdominal pain, hypokalemia  CT abdomen pelvis was done concerning for possible pancreatobiliary malignancy with liver mets   Patient, underwent CT chest in the emergency room, suggestive of PE  On heparin drip  Patient plan for liver biopsy today  Interval history  Patient is feeling better, still having abdominal pain  Had 1 episode of vomiting yesterday  Evaluated by oncologist        Recent Results (from the past 24 hour(s))   POC Glucose Fingerstick    Collection Time: 06/29/22 11:38 AM   Result Value Ref Range    POC Glucose 108 (H) 65 - 105 mg/dL   POC Glucose Fingerstick    Collection Time: 06/29/22  4:03 PM   Result Value Ref Range    POC Glucose 86 65 - 105 mg/dL   POC Glucose Fingerstick    Collection Time: 06/29/22 10:14 PM   Result Value Ref Range    POC Glucose 160 (H) 65 - 105 mg/dL   Anti-Xa, Unfractionated Heparin    Collection Time: 06/30/22  1:38 AM   Result Value Ref Range    Anti-XA Unfrac Heparin 0.68 0.30 - 0.70 IU/L   POC Glucose Fingerstick    Collection Time: 06/30/22  6:54 AM   Result Value Ref Range    POC Glucose 108 (H) 65 - 105 mg/dL Anti-Xa, Unfractionated Heparin    Collection Time: 06/30/22  7:57 AM   Result Value Ref Range    Anti-XA Unfrac Heparin 0.53 0.30 - 0.70 IU/L   Basic Metabolic Panel w/ Reflex to MG    Collection Time: 06/30/22  7:57 AM   Result Value Ref Range    Glucose 133 (H) 70 - 99 mg/dL    BUN 4 (L) 8 - 23 mg/dL    CREATININE 0.61 0.50 - 0.90 mg/dL    Calcium 7.9 (L) 8.6 - 10.4 mg/dL    Sodium 136 135 - 144 mmol/L    Potassium 3.4 (L) 3.7 - 5.3 mmol/L    Chloride 99 98 - 107 mmol/L    CO2 26 20 - 31 mmol/L    Anion Gap 11 9 - 17 mmol/L    GFR Non-African American >60 >60 mL/min    GFR African American >60 >60 mL/min    GFR Comment         Magnesium    Collection Time: 06/30/22  7:57 AM   Result Value Ref Range    Magnesium 1.6 1.6 - 2.6 mg/dL     Recent Labs     06/29/22  0624 06/29/22  1138 06/29/22  1603 06/29/22  2214 06/30/22  0654   POCGLU 121* 108* 86 160* 108*        CT ABDOMEN PELVIS W IV CONTRAST Additional Contrast? None    Result Date: 6/27/2022  EXAMINATION: CTA OF THE CHEST; CT OF THE ABDOMEN AND PELVIS WITH CONTRAST 6/27/2022 6:09 pm; 6/27/2022 6:20 pm TECHNIQUE: CTA of the chest was performed after the administration of intravenous contrast.  Multiplanar reformatted images are provided for review. MIP images are provided for review. Automated exposure control, iterative reconstruction, and/or weight based adjustment of the mA/kV was utilized to reduce the radiation dose to as low as reasonably achievable.; CT of the abdomen and pelvis was performed with the administration of intravenous contrast. Multiplanar reformatted images are provided for review. Automated exposure control, iterative reconstruction, and/or weight based adjustment of the mA/kV was utilized to reduce the radiation dose to as low as reasonably achievable.  COMPARISON: None CT abdomen pelvis 06/22/2022 HISTORY: ORDERING SYSTEM PROVIDED HISTORY: elevated D dimer, SOB TECHNOLOGIST PROVIDED HISTORY: elevated D dimer, SOB Decision Support Exception - unselect if not a suspected or confirmed emergency medical condition->Emergency Medical Condition (MA) Reason for Exam: elevated D dimer, SOB Relevant Medical/Surgical History: hx. of smoking and COPD. pt. states \"I was here lst week and they said I have cancer of some kind;not sure what kind yet. \"; ORDERING SYSTEM PROVIDED HISTORY: worsened abd pain, hx metastatic ca TECHNOLOGIST PROVIDED HISTORY: worsened abd pain, hx metastatic ca Decision Support Exception - unselect if not a suspected or confirmed emergency medical condition->Emergency Medical Condition (MA) Reason for Exam: pt. c/o SOB, body pains Relevant Medical/Surgical History: hx. of smoking, COPD. pt. states \"I was ere last week, and they said I have some kind of cancer. \" FINDINGS: Chest: Pulmonary Arteries: Pulmonary arteries are adequately opacified for evaluation. There are small filling defects in the right interlobar artery extending into the posterior segmental and subsegmental right upper lobe branches and to a lesser degree into the segmental branches of the medial segment right middle lobe with overall small clot burden. No saddle embolus. Main pulmonary artery is normal in caliber. Mediastinum: No enlarged mediastinal nodes with a lower paratracheal node anterior to the sumit and some subcarinal nodes which are upper limits of normal in caliber. Morphologically abnormal suspicious right supradiaphragmatic lymph nodes. Cardiomegaly. Three-vessel coronary artery calcifications. No pericardial effusion. There is no acute abnormality of the thoracic aorta. Lungs/pleura: Increased right pleural effusion with increased atelectatic changes in the dependent right lung parenchyma, greatest in the right lower lobe. Minimal left lower lobe atelectasis. Indeterminate 4 mm solid noncalcified nodule in the anterior segment left upper lobe. No pneumothorax or left-sided pleural effusion. Central airways are patent.  Soft Tissues/Bones: Degenerative changes without lytic or blastic osseous lesion. Remote median sternotomy which is healed. Calcifications in both breasts with a rim calcified likely oil cyst in the medial right breast.  No axillary adenopathy. Abdomen/Pelvis: Organs: Again noted are innumerable hypodense masses in the liver compatible with hepatic metastases. There is poor delineation of the gallbladder from the adjacent liver masses. There is cholelithiasis as well as choledocholithiasis with intrahepatic and extrahepatic biliary ductal dilatation which is overall similar to 5 days prior. The abnormal soft tissue attenuation extends toward the gastroduodenal junction with loss of the intervening fat plane. The spleen is normal in size and attenuation. The pancreas is atrophic. No peripancreatic inflammatory changes. Adrenal glands remain normal caliber. Kidneys enhance symmetrically aside from a tiny hypodensity in the lateral upper pole left kidney that is too small to definitively characterize. There is no hydronephrosis. Normal caliber ureters. GI/Bowel: The bowel remains normal in caliber without obstruction. An appendix is not definitively identified. Pelvis: The urinary bladder is normal in appearance. Diminutive postmenopausal female pelvic organs. Peritoneum/Retroperitoneum: No free air. Small volume ascites. Irregular peritoneal soft tissue nodularity is redemonstrated compatible with peritoneal carcinomatosis. Enlarged lymph nodes about the yi hepatis. No gross pelvic adenopathy. Infrarenal abdominal aortic aneurysm measuring up to 3.1 cm on a background of moderate to heavy atherosclerosis. Bones/Soft Tissues: Degenerative changes predominating in the mid to lower lumbar facets. No lytic or blastic osseous lesion. Small fat containing umbilical hernia with a peritoneal nodule at the hernia orifice. Mild anasarca.  [CHEST Small pulmonary emboli in the right upper and right middle lobes with an overall small clot burden. Increased right pleural effusion and associated atelectasis of the right lung. Indeterminate 4 mm nodule in the left upper lobe which will warrant attention on follow-up in this patient with findings of intra-abdominal malignancy. Morphologically abnormal appearing supradiaphragmatic lymph nodes on the right suspicious for possible intrathoracic metastatic josh spread. ABDOMEN/PELVIS Findings in the abdomen and pelvis are overall not substantially changed from 5 days prior. Diffuse hepatic metastases with loss of the fat plane between the liver and the gastroduodenal junction worrisome for possible direct extension. Poor delineation of the gallbladder either due to extension of the hepatic disease or possibly due to primary biliary neoplasm. Cholelithiasis and choledocholithiasis with intrahepatic and extrahepatic biliary ductal dilatation, unchanged from recent prior. Peritoneal carcinomatosis and small volume presumably malignant ascites. Infrarenal abdominal aortic aneurysm spanning up to 3.1 cm with recommendations below. Critical results were called by Dr. Radha Cheng to Dr. Taylor Garland on 6/27/2022 at 10:22 PM EST. RECOMMENDATIONS: 3.1 cm infrarenal abdominal aortic aneurysm. Recommend follow-up every 3 years. Reference: [facility] 3907;78:[id number]. XR CHEST PORTABLE    Result Date: 6/27/2022  EXAMINATION: ONE XRAY VIEW OF THE CHEST 6/27/2022 4:39 pm COMPARISON: 03/15/2010 HISTORY: ORDERING SYSTEM PROVIDED HISTORY: SOB, CP TECHNOLOGIST PROVIDED HISTORY: SOB, CP Reason for Exam: PT CO cough with SOB and CP X several days. FINDINGS: Postsurgical changes overlying the mediastinum. The cardiac size is normal. Mild bibasal fibrosis and small right pleural effusion. .  Pulmonary vascularity appears normal. There is mild ectasia of the thoracic aorta. There are degenerative changes in the spine . No acute bony abnormalities.  The hilar structures are normal.     Mild bibasal fibrosis and small right pleural effusion. CT CHEST PULMONARY EMBOLISM W CONTRAST    Result Date: 6/27/2022  EXAMINATION: CTA OF THE CHEST; CT OF THE ABDOMEN AND PELVIS WITH CONTRAST 6/27/2022 6:09 pm; 6/27/2022 6:20 pm TECHNIQUE: CTA of the chest was performed after the administration of intravenous contrast.  Multiplanar reformatted images are provided for review. MIP images are provided for review. Automated exposure control, iterative reconstruction, and/or weight based adjustment of the mA/kV was utilized to reduce the radiation dose to as low as reasonably achievable.; CT of the abdomen and pelvis was performed with the administration of intravenous contrast. Multiplanar reformatted images are provided for review. Automated exposure control, iterative reconstruction, and/or weight based adjustment of the mA/kV was utilized to reduce the radiation dose to as low as reasonably achievable. COMPARISON: None CT abdomen pelvis 06/22/2022 HISTORY: ORDERING SYSTEM PROVIDED HISTORY: elevated D dimer, SOB TECHNOLOGIST PROVIDED HISTORY: elevated D dimer, SOB Decision Support Exception - unselect if not a suspected or confirmed emergency medical condition->Emergency Medical Condition (MA) Reason for Exam: elevated D dimer, SOB Relevant Medical/Surgical History: hx. of smoking and COPD. pt. states \"I was here lst week and they said I have cancer of some kind;not sure what kind yet. \"; ORDERING SYSTEM PROVIDED HISTORY: worsened abd pain, hx metastatic ca TECHNOLOGIST PROVIDED HISTORY: worsened abd pain, hx metastatic ca Decision Support Exception - unselect if not a suspected or confirmed emergency medical condition->Emergency Medical Condition (MA) Reason for Exam: pt. c/o SOB, body pains Relevant Medical/Surgical History: hx. of smoking, COPD. pt. states \"I was ere last week, and they said I have some kind of cancer. \" FINDINGS: Chest: Pulmonary Arteries: Pulmonary arteries are adequately opacified for evaluation.   There are small filling defects in the right interlobar artery extending into the posterior segmental and subsegmental right upper lobe branches and to a lesser degree into the segmental branches of the medial segment right middle lobe with overall small clot burden. No saddle embolus. Main pulmonary artery is normal in caliber. Mediastinum: No enlarged mediastinal nodes with a lower paratracheal node anterior to the sumit and some subcarinal nodes which are upper limits of normal in caliber. Morphologically abnormal suspicious right supradiaphragmatic lymph nodes. Cardiomegaly. Three-vessel coronary artery calcifications. No pericardial effusion. There is no acute abnormality of the thoracic aorta. Lungs/pleura: Increased right pleural effusion with increased atelectatic changes in the dependent right lung parenchyma, greatest in the right lower lobe. Minimal left lower lobe atelectasis. Indeterminate 4 mm solid noncalcified nodule in the anterior segment left upper lobe. No pneumothorax or left-sided pleural effusion. Central airways are patent. Soft Tissues/Bones: Degenerative changes without lytic or blastic osseous lesion. Remote median sternotomy which is healed. Calcifications in both breasts with a rim calcified likely oil cyst in the medial right breast.  No axillary adenopathy. Abdomen/Pelvis: Organs: Again noted are innumerable hypodense masses in the liver compatible with hepatic metastases. There is poor delineation of the gallbladder from the adjacent liver masses. There is cholelithiasis as well as choledocholithiasis with intrahepatic and extrahepatic biliary ductal dilatation which is overall similar to 5 days prior. The abnormal soft tissue attenuation extends toward the gastroduodenal junction with loss of the intervening fat plane. The spleen is normal in size and attenuation. The pancreas is atrophic. No peripancreatic inflammatory changes. Adrenal glands remain normal caliber.   Kidneys enhance symmetrically aside from a tiny hypodensity in the lateral upper pole left kidney that is too small to definitively characterize. There is no hydronephrosis. Normal caliber ureters. GI/Bowel: The bowel remains normal in caliber without obstruction. An appendix is not definitively identified. Pelvis: The urinary bladder is normal in appearance. Diminutive postmenopausal female pelvic organs. Peritoneum/Retroperitoneum: No free air. Small volume ascites. Irregular peritoneal soft tissue nodularity is redemonstrated compatible with peritoneal carcinomatosis. Enlarged lymph nodes about the yi hepatis. No gross pelvic adenopathy. Infrarenal abdominal aortic aneurysm measuring up to 3.1 cm on a background of moderate to heavy atherosclerosis. Bones/Soft Tissues: Degenerative changes predominating in the mid to lower lumbar facets. No lytic or blastic osseous lesion. Small fat containing umbilical hernia with a peritoneal nodule at the hernia orifice. Mild anasarca. [CHEST Small pulmonary emboli in the right upper and right middle lobes with an overall small clot burden. Increased right pleural effusion and associated atelectasis of the right lung. Indeterminate 4 mm nodule in the left upper lobe which will warrant attention on follow-up in this patient with findings of intra-abdominal malignancy. Morphologically abnormal appearing supradiaphragmatic lymph nodes on the right suspicious for possible intrathoracic metastatic josh spread. ABDOMEN/PELVIS Findings in the abdomen and pelvis are overall not substantially changed from 5 days prior. Diffuse hepatic metastases with loss of the fat plane between the liver and the gastroduodenal junction worrisome for possible direct extension. Poor delineation of the gallbladder either due to extension of the hepatic disease or possibly due to primary biliary neoplasm.   Cholelithiasis and choledocholithiasis with intrahepatic and extrahepatic biliary ductal Right Impression:                    Left Impression:  Non visualization of the posterior   Non visualization of the posterior  tibial and peroneal veins. tibial and peroneal veins. The common femoral, femoral,         The common femoral, femoral,  popliteal and tibial veins           popliteal and tibial veins  demonstrate normal compressibility. demonstrate normal compressibility. Normal compressibility of the great  Normal compressibility of the great  saphenous vein. saphenous vein. Normal compressibility of the small  Normal compressibility of the small  saphenous vein. saphenous vein. Velocities are measured in cm/s ; Diameters are measured in cm Right Lower Extremities DVT Study Measurements Right 2D Measurements +------------------------------------+----------+---------------+----------+ ! Location                            ! Visualized! Compressibility! Thrombosis! +------------------------------------+----------+---------------+----------+ ! Common Femoral                      !Yes       ! Yes            ! None      ! +------------------------------------+----------+---------------+----------+ ! Prox Femoral                        !Yes       ! Yes            ! None      ! +------------------------------------+----------+---------------+----------+ ! Mid Femoral                         !Yes       ! Yes            ! None      ! +------------------------------------+----------+---------------+----------+ ! Dist Femoral                        !Yes       ! Yes            ! None      ! +------------------------------------+----------+---------------+----------+ ! Popliteal                           !Yes       ! Yes            ! None      ! +------------------------------------+----------+---------------+----------+ ! Sapheno Femoral Junction            ! Yes       ! Yes            ! None      ! +------------------------------------+----------+---------------+----------+ ! PTV !No        !               !          ! +------------------------------------+----------+---------------+----------+ ! Peroneal                            !No        !               !          ! +------------------------------------+----------+---------------+----------+ ! Gastroc                             ! Yes       ! Yes            ! None      ! +------------------------------------+----------+---------------+----------+ ! GSV Thigh                           ! Yes       ! Yes            ! None      ! +------------------------------------+----------+---------------+----------+ ! GSV Knee                            ! Yes       ! Yes            ! None      ! +------------------------------------+----------+---------------+----------+ ! GSV Ankle                           ! Yes       ! Yes            ! None      ! +------------------------------------+----------+---------------+----------+ ! SSV                                 ! Yes       ! Yes            ! None      ! +------------------------------------+----------+---------------+----------+ Right Doppler Measurements +---------------------------+------+------+--------------------------------+ ! Location                   ! Signal!Reflux! Reflux (msec)                   ! +---------------------------+------+------+--------------------------------+ ! Common Femoral             !Phasic!      !                                ! +---------------------------+------+------+--------------------------------+ ! Prox Femoral               !Phasic!      !                                ! +---------------------------+------+------+--------------------------------+ ! Popliteal                  !Phasic!      !                                ! +---------------------------+------+------+--------------------------------+ Left Lower Extremities DVT Study Measurements Left 2D Measurements +------------------------------------+----------+---------------+----------+ ! Location !Visualized! Compressibility! Thrombosis! +------------------------------------+----------+---------------+----------+ ! Common Femoral                      !Yes       ! Yes            ! None      ! +------------------------------------+----------+---------------+----------+ ! Prox Femoral                        !Yes       ! Yes            ! None      ! +------------------------------------+----------+---------------+----------+ ! Mid Femoral                         !Yes       ! Yes            ! None      ! +------------------------------------+----------+---------------+----------+ ! Dist Femoral                        !Yes       ! Yes            ! None      ! +------------------------------------+----------+---------------+----------+ ! Popliteal                           !Yes       ! Yes            ! None      ! +------------------------------------+----------+---------------+----------+ ! Sapheno Femoral Junction            ! Yes       ! Yes            ! None      ! +------------------------------------+----------+---------------+----------+ ! PTV                                 ! No        !               !          ! +------------------------------------+----------+---------------+----------+ ! Peroneal                            !No        !               !          ! +------------------------------------+----------+---------------+----------+ ! Gastroc                             ! Yes       ! Yes            ! None      ! +------------------------------------+----------+---------------+----------+ ! GSV Thigh                           ! Yes       ! Yes            ! None      ! +------------------------------------+----------+---------------+----------+ ! GSV Knee                            ! Yes       ! Yes            ! None      ! +------------------------------------+----------+---------------+----------+ ! GSV Ankle                           ! Yes       ! Yes            ! None      ! +------------------------------------+----------+---------------+----------+ ! SSV                                 ! Yes       ! Yes            ! None      ! +------------------------------------+----------+---------------+----------+ Left Doppler Measurements +---------------------------+------+------+--------------------------------+ ! Location                   ! Signal!Reflux! Reflux (msec)                   ! +---------------------------+------+------+--------------------------------+ ! Common Femoral             !Phasic!      !                                ! +---------------------------+------+------+--------------------------------+ ! Prox Femoral               !Phasic!      !                                ! +---------------------------+------+------+--------------------------------+ ! Popliteal                  !Phasic!      !                                ! +---------------------------+------+------+--------------------------------+            On admission         Review of Systems:     As recorded in HPI          Physical Examination:        Vitals:    06/30/22 0004 06/30/22 0230 06/30/22 0704 06/30/22 0858   BP: 89/60  112/63    Pulse: 75  72 68   Resp: 16  16 16   Temp: 98.7 °F (37.1 °C)  98.1 °F (36.7 °C)    TempSrc: Oral  Oral    SpO2: 92%  94% 95%   Weight:  203 lb 4.2 oz (92.2 kg)     Height:           Recent Labs     06/29/22  1138 06/29/22  1603 06/29/22  2214 06/30/22  0654   POCGLU 108* 86 160* 108*       Intake/Output Summary (Last 24 hours) at 6/30/2022 0950  Last data filed at 6/30/2022 0815  Gross per 24 hour   Intake 820 ml   Output --   Net 820 ml       General Appearance:  alert, well appearing, and in no acute distress, central obesity present  Mental status:   Head:  normocephalic, atraumatic.   Eye: no icterus, redness, pupils equal and reactive, extraocular eye movements intact, conjunctiva clear  Ear: normal external ear, no discharge, hearing intact  Nose:  no drainage noted  Mouth: mucous **OR** potassium alternative oral replacement **OR** potassium chloride, perflutren lipid microspheres, sodium chloride flush, heparin (porcine), heparin (porcine)          Assessment:        Primary Problem  Pulmonary embolus ([facility])    Principal Problem:    Pulmonary embolus ([facility])  Active Problems:    Liver mass    Diabetes mellitus type 2 in obese (HCC)    Morbid obesity ([address]  Resolved Problems:    * No resolved hospital problems. 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 Dionisio Wilson yesterday  Patient, will have liver biopsy today, likely start on Eliquis tomorrow  Will be on Plavix and Eliquis long-term, will avoid triple therapy  Patient will need placement    6/30   Patient, clinically doing better  Started patient on Plavix, changing heparin drip to Eliquis  Patient need to be on Eliquis and Plavix for rest of her life  Will discontinue aspirin    To patient length  Patient will be getting discharge to SNF, awaiting placement  Overall prognosis guarded  No bleeding from the local site   . Hospital Problems           Last Modified POA    * (Principal) Pulmonary embolus ([address] 6/28/2022 Yes    Liver mass 6/28/2022 Yes    Diabetes mellitus type 2 in obese [facility]) 6/28/2022 Yes    Morbid obesity ([address] 6/28/2022 Yes                         Thanks for consulting us . Will monitor vitals and clinical course , and  Optimize therapy  as needed .            Matty Dunaway MD  6/30/2022

## 2022-06-30 NOTE — CARE COORDINATION
Writer spoke to Mmia Schroeder from [employer] in [place], [place]. Starter Pack of Eliquis will need PA. Maintenance Dose, 5 MG, Bid, #60 is $0 cost thru [employer]. Pt. Given Free 30 Day card & Informed of Above. Writer spoke to Dr. Leighton Brock NurseLucho, in regard to above & states understanding, for pt. Is going to a Facility & should be past the 2 week liz of Starter Dose. Writer did speak to Pt. In regards to 2nd SNF choice, She wants somewhere on the [facility], for it is close to her home & family.

## 2022-06-30 NOTE — PROGRESS NOTES
Pt was sitting on the edge of the bed when she claimed that she was starting to  feel light headed and SOB, she also claimed, ''my chest is really starting to hurt. \"  Pt placed back in bed and 2L of NC started. Vitals obtained and charted into epic. EKG completed and IM physician notified of new onset of chest pain. Troponin ordered STAT.

## 2022-06-30 NOTE — PROGRESS NOTES
Physical Therapy  Facility/Department: [facility]  Daily Treatment Note  NAME: Katie Beverly  : 1950  MRN: [id number]    Date of Service: 2022    Discharge Recommendations:  Patient would benefit from continued therapy after discharge        Patient Diagnosis(es): The primary encounter diagnosis was Other acute pulmonary embolism without acute cor pulmonale ([address] A diagnosis of Liver mass was also pertinent to this visit. Assessment   Activity Tolerance: Patient tolerated treatment well;Patient limited by endurance; Patient limited by fatigue     Plan    Plan  Plan: 5-7 times per week  Current Treatment Recommendations: Strengthening;Balance training;Functional mobility training;Transfer training; Endurance training;Gait training; Safety education & training;Patient/Caregiver education & training; Therapeutic activities;Stair training     Restrictions  Restrictions/Precautions  Restrictions/Precautions: General Precautions,Fall Risk,Bed Alarm  Required Braces or Orthoses?: No  Implants present? : Metal implants (2 screws in L foot)     Subjective    Subjective  Subjective: Pt is seated in bedside chair on arrival. Reports just coming back from bathroom. Pleasant and cooperative with therapy  Pain: Denies pain  Orientation  Overall Orientation Status: Within Functional Limits  Cognition  Overall Cognitive Status: WFL     Objective   Vitals     Bed Mobility Training  Bed Mobility Training: No  Balance  Sitting: Intact  Standing: Without support  Transfer Training  Transfer Training: Yes  Overall Level of Assistance: Stand-by assistance  Sit to Stand: Stand-by assistance  Stand to Sit: Stand-by assistance  Gait Training  Gait Training: Yes  Gait  Overall Level of Assistance: Contact-guard assistance (for safety)  Speed/Ritika: Slow  Step Length: Right shortened;Left shortened  Swing Pattern: Left symmetrical;Right symmetrical  Stance: Left decreased  Gait Abnormalities: Antalgic; Path deviations;Decreased step clearance  Distance (ft): 132 Feet (Fatigued post Amb)  Assistive Device:  (no device)  Rail Use: Left  Stairs - Level of Assistance: Contact-guard assistance  Number of Stairs Trained: 5 (4\" step)     PT Exercises  A/AROM Exercises: Seated BLE exs x 10             Goals  Short Term Goals  Time Frame for Short term goals: 1 week  Short term goal 1: pt will perform bed mobility independently to improve function  Short term goal 2: pt will perform sit<>stand transfer with SBA and no device  Short term goal 3: pt will ambulate 150' with SBA to increase endurance  Short term goal 4: pt will perform 10 step ups onto a 6\" platform with handheld assist to safely return home    Education  Patient Education  Education Given To: Patient  Education Provided: Role of Therapy;Plan of Care;Transfer Training; Fall Prevention Strategies; Energy Conservation  Education Method: Verbal;Demonstration  Barriers to Learning: None  Education Outcome: Verbalized understanding;Demonstrated understanding    Therapy Time   Individual Concurrent Group Co-treatment   Time In 1037         Time Out [facility]

## 2022-06-30 NOTE — PROGRESS NOTES
Comprehensive Nutrition Assessment    Type and Reason for Visit:  Reassess    Nutrition Recommendations/Plan:   1. Recommend continue 4 carbohydrate choices per tray with Magic Cup supplements twice daily     Malnutrition Assessment:  Malnutrition Status: At risk for malnutrition (Comment) (06/28/22 2330)    Context:  Acute Illness     Findings of the 6 clinical characteristics of malnutrition:  Energy Intake:  50% or less of estimated energy requirements for 5 or more days  Weight Loss:  Unable to assess     Body Fat Loss:  No significant body fat loss     Muscle Mass Loss:  No significant muscle mass loss    Fluid Accumulation:  No significant fluid accumulation     Strength:  Not Performed    Nutrition Assessment:    Pt continues to consume only small amounts (less than 25%) despite supplements. Plan is for discharge to [facility]. Nutrition Related Findings:    Labs/Meds: Reviewed Wound Type: None       Current Nutrition Intake & Therapies:    Average Meal Intake: 1-25%     ADULT DIET; Regular; 4 carb choices (60 gm/meal)  ADULT ORAL NUTRITION SUPPLEMENT; Lunch, Dinner; Frozen Oral Supplement    Anthropometric Measures:  Height: 5' (152.4 cm)  Ideal Body Weight (IBW): 100 lbs (45 kg)    Admission Body Weight: 186 lb (84.4 kg)  Current Body Weight: 203 lb (92.1 kg), 186 % IBW.  Weight Source: Bed Scale  Current BMI (kg/m2): 39.6  Usual Body Weight:  (215# in 2020)                       BMI Categories: Obese Class 2 (BMI 35.0 -39.9)    Estimated Daily Nutrient Needs:  Energy Requirements Based On: Formula     Energy (kcal/day): 0647-9945 kcal ( [place] x 1.3)  Weight Used for Protein Requirements: Current  Protein (g/day): 1.5 g/kg= 125 g       Nutrition Diagnosis:   · Inadequate oral intake related to  (current medical condition, poor appetite) as evidenced by intake 51-75%,poor intake prior to admission    Nutrition Interventions:   Food and/or Nutrient Delivery: Continue Current Diet,Continue Oral Nutrition Supplement  Nutrition Education/Counseling: No recommendation at this time  Coordination of Nutrition Care: Continue to monitor while inpatient       Goals:  Previous Goal Met: No Progress toward Goal(s)  Goals: PO intake 50% or greater       Nutrition Monitoring and Evaluation:      Food/Nutrient Intake Outcomes: Food and Nutrient Intake,Supplement Intake  Physical Signs/Symptoms Outcomes: Biochemical Data,GI Status,Fluid Status or Edema,Skin,Weight    Discharge Planning:    Continue current diet     Wendy Lucio, [address] [place], [place]  Contact: [phone]

## 2022-06-30 NOTE — CARE COORDINATION
IMM letter signed 6/30/22 @11:27am. Patient reported that she did not want a copy of her IMM.  Electronically signed by Migdalia Santillan on 6/30/2022 at 12:05 PM

## 2022-06-30 NOTE — FLOWSHEET NOTE
PT was shocked as she came for flue but it turned out to be cancer. Welcomed prayer. 06/30/22 1510   Encounter Summary   Encounter Overview/Reason  Initial Encounter   Service Provided For: Patient   Referral/Consult From: [address] [facility] Children;Family members   Last Encounter  06/30/22   Complexity of Encounter Moderate   Spiritual/Emotional needs   Type Spiritual Support   Assessment/Intervention/Outcome   Assessment Calm; Anxious; Coping; Shock   Intervention Active listening;Prayer (assurance of)/Prue;Sustaining Presence/Ministry of presence   Outcome Acceptance; Coping;Encouraged;Peace

## 2022-07-01 ENCOUNTER — TELEPHONE (OUTPATIENT)
Dept: ONCOLOGY | Age: 72
End: 2022-07-01

## 2022-07-01 LAB
ANION GAP SERPL CALCULATED.3IONS-SCNC: 10 MMOL/L (ref 9–17)
BUN BLDV-MCNC: 5 MG/DL (ref 8–23)
CALCIUM SERPL-MCNC: 8.4 MG/DL (ref 8.6–10.4)
CHLORIDE BLD-SCNC: 99 MMOL/L (ref 98–107)
CO2: 26 MMOL/L (ref 20–31)
CREAT SERPL-MCNC: 0.66 MG/DL (ref 0.5–0.9)
EKG ATRIAL RATE: 77 BPM
EKG P AXIS: 62 DEGREES
EKG P-R INTERVAL: 148 MS
EKG Q-T INTERVAL: 406 MS
EKG QRS DURATION: 116 MS
EKG QTC CALCULATION (BAZETT): 459 MS
EKG R AXIS: 76 DEGREES
EKG T AXIS: 160 DEGREES
EKG VENTRICULAR RATE: 77 BPM
GFR AFRICAN AMERICAN: >60 ML/MIN
GFR NON-AFRICAN AMERICAN: >60 ML/MIN
GFR SERPL CREATININE-BSD FRML MDRD: ABNORMAL ML/MIN/{1.73_M2}
GLUCOSE BLD-MCNC: 125 MG/DL (ref 65–105)
GLUCOSE BLD-MCNC: 130 MG/DL (ref 70–99)
GLUCOSE BLD-MCNC: 146 MG/DL (ref 65–105)
GLUCOSE BLD-MCNC: 148 MG/DL (ref 65–105)
GLUCOSE BLD-MCNC: 150 MG/DL (ref 65–105)
HCT VFR BLD CALC: 34.8 % (ref 36–46)
HEMOGLOBIN: 11.2 G/DL (ref 12–16)
MCH RBC QN AUTO: 28.8 PG (ref 26–34)
MCHC RBC AUTO-ENTMCNC: 32.3 G/DL (ref 31–37)
MCV RBC AUTO: 89.1 FL (ref 80–100)
PDW BLD-RTO: 14.1 % (ref 11.5–14.9)
PLATELET # BLD: 470 K/UL (ref 150–450)
PMV BLD AUTO: 7.4 FL (ref 6–12)
POTASSIUM SERPL-SCNC: 4.1 MMOL/L (ref 3.7–5.3)
RBC # BLD: 3.91 M/UL (ref 4–5.2)
SODIUM BLD-SCNC: 135 MMOL/L (ref 135–144)
TROPONIN, HIGH SENSITIVITY: 11 NG/L (ref 0–14)
WBC # BLD: 17.1 K/UL (ref 3.5–11)

## 2022-07-01 PROCEDURE — 99233 SBSQ HOSP IP/OBS HIGH 50: CPT | Performed by: INTERNAL MEDICINE

## 2022-07-01 PROCEDURE — 97530 THERAPEUTIC ACTIVITIES: CPT

## 2022-07-01 PROCEDURE — 85027 COMPLETE CBC AUTOMATED: CPT

## 2022-07-01 PROCEDURE — 6370000000 HC RX 637 (ALT 250 FOR IP): Performed by: INTERNAL MEDICINE

## 2022-07-01 PROCEDURE — 93010 ELECTROCARDIOGRAM REPORT: CPT | Performed by: INTERNAL MEDICINE

## 2022-07-01 PROCEDURE — 2060000000 HC ICU INTERMEDIATE R&B

## 2022-07-01 PROCEDURE — 2580000003 HC RX 258: Performed by: NURSE PRACTITIONER

## 2022-07-01 PROCEDURE — 97110 THERAPEUTIC EXERCISES: CPT

## 2022-07-01 PROCEDURE — 84484 ASSAY OF TROPONIN QUANT: CPT

## 2022-07-01 PROCEDURE — 93005 ELECTROCARDIOGRAM TRACING: CPT | Performed by: STUDENT IN AN ORGANIZED HEALTH CARE EDUCATION/TRAINING PROGRAM

## 2022-07-01 PROCEDURE — 80048 BASIC METABOLIC PNL TOTAL CA: CPT

## 2022-07-01 PROCEDURE — 6370000000 HC RX 637 (ALT 250 FOR IP): Performed by: NURSE PRACTITIONER

## 2022-07-01 PROCEDURE — 94640 AIRWAY INHALATION TREATMENT: CPT

## 2022-07-01 PROCEDURE — 36415 COLL VENOUS BLD VENIPUNCTURE: CPT

## 2022-07-01 PROCEDURE — 2700000000 HC OXYGEN THERAPY PER DAY

## 2022-07-01 PROCEDURE — 6360000002 HC RX W HCPCS: Performed by: NURSE PRACTITIONER

## 2022-07-01 PROCEDURE — 94761 N-INVAS EAR/PLS OXIMETRY MLT: CPT

## 2022-07-01 PROCEDURE — 82947 ASSAY GLUCOSE BLOOD QUANT: CPT

## 2022-07-01 RX ORDER — OXYCODONE HCL 10 MG/1
10 TABLET, FILM COATED, EXTENDED RELEASE ORAL EVERY 12 HOURS SCHEDULED
Status: DISCONTINUED | OUTPATIENT
Start: 2022-07-01 | End: 2022-07-05 | Stop reason: HOSPADM

## 2022-07-01 RX ORDER — OXYCODONE HCL 10 MG/1
10 TABLET, FILM COATED, EXTENDED RELEASE ORAL EVERY 12 HOURS SCHEDULED
Qty: 10 TABLET | Refills: 0 | Status: SHIPPED | OUTPATIENT
Start: 2022-07-01 | End: 2022-07-06

## 2022-07-01 RX ORDER — ALPRAZOLAM 0.5 MG/1
0.5 TABLET ORAL 3 TIMES DAILY PRN
Qty: 15 TABLET | Refills: 0 | Status: SHIPPED | OUTPATIENT
Start: 2022-07-01 | End: 2022-07-06

## 2022-07-01 RX ORDER — ALPRAZOLAM 0.5 MG/1
0.5 TABLET ORAL 3 TIMES DAILY PRN
Status: DISCONTINUED | OUTPATIENT
Start: 2022-07-01 | End: 2022-07-05 | Stop reason: HOSPADM

## 2022-07-01 RX ADMIN — OXYCODONE HYDROCHLORIDE 5 MG: 5 TABLET ORAL at 08:37

## 2022-07-01 RX ADMIN — MULTIPLE VITAMINS W/ MINERALS TAB 1 TABLET: TAB at 10:53

## 2022-07-01 RX ADMIN — GABAPENTIN 600 MG: 600 TABLET, FILM COATED ORAL at 10:53

## 2022-07-01 RX ADMIN — BUDESONIDE AND FORMOTEROL FUMARATE DIHYDRATE 2 PUFF: 160; 4.5 AEROSOL RESPIRATORY (INHALATION) at 19:36

## 2022-07-01 RX ADMIN — SODIUM CHLORIDE, PRESERVATIVE FREE 10 ML: 5 INJECTION INTRAVENOUS at 20:32

## 2022-07-01 RX ADMIN — RANOLAZINE 500 MG: 500 TABLET, EXTENDED RELEASE ORAL at 20:33

## 2022-07-01 RX ADMIN — VENLAFAXINE HYDROCHLORIDE 75 MG: 75 CAPSULE, EXTENDED RELEASE ORAL at 10:52

## 2022-07-01 RX ADMIN — OXYBUTYNIN CHLORIDE 5 MG: 5 TABLET ORAL at 20:33

## 2022-07-01 RX ADMIN — ATORVASTATIN CALCIUM 80 MG: 80 TABLET, FILM COATED ORAL at 10:51

## 2022-07-01 RX ADMIN — METOPROLOL SUCCINATE 50 MG: 50 TABLET, EXTENDED RELEASE ORAL at 10:53

## 2022-07-01 RX ADMIN — CLOPIDOGREL BISULFATE 75 MG: 75 TABLET ORAL at 10:53

## 2022-07-01 RX ADMIN — FLUTICASONE PROPIONATE 2 SPRAY: 50 SPRAY, METERED NASAL at 10:51

## 2022-07-01 RX ADMIN — APIXABAN 10 MG: 5 TABLET, FILM COATED ORAL at 20:33

## 2022-07-01 RX ADMIN — PANTOPRAZOLE SODIUM 40 MG: 40 TABLET, DELAYED RELEASE ORAL at 05:31

## 2022-07-01 RX ADMIN — OXYBUTYNIN CHLORIDE 5 MG: 5 TABLET ORAL at 10:52

## 2022-07-01 RX ADMIN — TIOTROPIUM BROMIDE INHALATION SPRAY 2 PUFF: 3.12 SPRAY, METERED RESPIRATORY (INHALATION) at 07:36

## 2022-07-01 RX ADMIN — ALBUTEROL SULFATE 2 PUFF: 90 AEROSOL, METERED RESPIRATORY (INHALATION) at 19:35

## 2022-07-01 RX ADMIN — SODIUM CHLORIDE, PRESERVATIVE FREE 10 ML: 5 INJECTION INTRAVENOUS at 11:05

## 2022-07-01 RX ADMIN — OXYCODONE HYDROCHLORIDE 5 MG: 5 TABLET ORAL at 15:51

## 2022-07-01 RX ADMIN — GABAPENTIN 600 MG: 600 TABLET, FILM COATED ORAL at 15:40

## 2022-07-01 RX ADMIN — HYDROMORPHONE HYDROCHLORIDE 0.5 MG: 1 INJECTION, SOLUTION INTRAMUSCULAR; INTRAVENOUS; SUBCUTANEOUS at 05:35

## 2022-07-01 RX ADMIN — OXYCODONE HYDROCHLORIDE 10 MG: 10 TABLET, FILM COATED, EXTENDED RELEASE ORAL at 20:33

## 2022-07-01 RX ADMIN — ARIPIPRAZOLE 5 MG: 5 TABLET ORAL at 10:51

## 2022-07-01 RX ADMIN — GABAPENTIN 600 MG: 600 TABLET, FILM COATED ORAL at 20:33

## 2022-07-01 RX ADMIN — VENLAFAXINE HYDROCHLORIDE 150 MG: 150 CAPSULE, EXTENDED RELEASE ORAL at 11:01

## 2022-07-01 RX ADMIN — APIXABAN 10 MG: 5 TABLET, FILM COATED ORAL at 10:53

## 2022-07-01 RX ADMIN — BUDESONIDE AND FORMOTEROL FUMARATE DIHYDRATE 2 PUFF: 160; 4.5 AEROSOL RESPIRATORY (INHALATION) at 07:36

## 2022-07-01 RX ADMIN — OXYCODONE HYDROCHLORIDE 10 MG: 10 TABLET, FILM COATED, EXTENDED RELEASE ORAL at 10:51

## 2022-07-01 ASSESSMENT — PAIN - FUNCTIONAL ASSESSMENT
PAIN_FUNCTIONAL_ASSESSMENT: ACTIVITIES ARE NOT PREVENTED
PAIN_FUNCTIONAL_ASSESSMENT: PREVENTS OR INTERFERES SOME ACTIVE ACTIVITIES AND ADLS

## 2022-07-01 ASSESSMENT — PAIN SCALES - GENERAL
PAINLEVEL_OUTOF10: 7
PAINLEVEL_OUTOF10: 6
PAINLEVEL_OUTOF10: 5
PAINLEVEL_OUTOF10: 7
PAINLEVEL_OUTOF10: 3
PAINLEVEL_OUTOF10: 7
PAINLEVEL_OUTOF10: 7
PAINLEVEL_OUTOF10: 6

## 2022-07-01 ASSESSMENT — PAIN DESCRIPTION - DESCRIPTORS
DESCRIPTORS: ACHING
DESCRIPTORS: THROBBING;ACHING

## 2022-07-01 ASSESSMENT — PAIN DESCRIPTION - LOCATION
LOCATION: ABDOMEN

## 2022-07-01 ASSESSMENT — PAIN DESCRIPTION - ORIENTATION: ORIENTATION: LEFT;RIGHT

## 2022-07-01 NOTE — PROGRESS NOTES
[place] [address] [facility]    Progress Note     7/1/2022    8:33 AM    Name:   Josemanuel Glass  MRN:     [id number]     Acct:      [de-identified]   Room:   2095/2095-01   Day:  4  Admit Date:  6/27/2022  6:49 PM    PCP:   Janneth Lovett MD  Code Status:  Full Code    Subjective:     C/C:   Chief Complaint   Patient presents with    Fatigue    Abdominal Pain     Principal Problem:    Pulmonary embolus ([address]  Active Problems:    Liver mass    Diabetes mellitus type 2 in obese ([facility] [address]    Morbid obesity ([address]  Resolved Problems:    * No resolved hospital problems.  *      Patient unfortunately has multiple medical problem, and she is not a very good historian  She has history of coronary artery disease s/p CABG back in 1999, COPD, diabetes, hypertension, patient was recently discharged from the hospital, when she was admitted abdominal pain, hypokalemia  CT abdomen pelvis was done concerning for possible pancreatobiliary malignancy with liver mets   Patient, underwent CT chest in the emergency room, suggestive of PE  On heparin drip  Patient plan for liver biopsy done  Results pending          Recent Results (from the past 24 hour(s))   POC Glucose Fingerstick    Collection Time: 06/30/22 11:18 AM   Result Value Ref Range    POC Glucose 137 (H) 65 - 105 mg/dL   POC Glucose Fingerstick    Collection Time: 06/30/22  4:13 PM   Result Value Ref Range    POC Glucose 114 (H) 65 - 105 mg/dL   Troponin    Collection Time: 06/30/22  5:12 PM   Result Value Ref Range    Troponin, High Sensitivity 14 0 - 14 ng/L   Troponin    Collection Time: 06/30/22  6:39 PM   Result Value Ref Range    Troponin, High Sensitivity 12 0 - 14 ng/L   POC Glucose Fingerstick    Collection Time: 06/30/22  8:38 PM   Result Value Ref Range    POC Glucose 133 (H) 65 - 105 mg/dL   CBC    Collection Time: 07/01/22  5:41 AM   Result Value Ref Range    WBC 17.1 (H) 3.5 - 11.0 k/uL    RBC 3.91 (L) 4.0 - 5.2 m/uL    Hemoglobin 11.2 (L) 12.0 - 16.0 g/dL    Hematocrit 34.8 (L) 36 - 46 %    MCV 89.1 80 - 100 fL    MCH 28.8 26 - 34 pg    MCHC 32.3 31 - 37 g/dL    RDW 14.1 11.5 - 14.9 %    Platelets 054 (H) 348 - 450 k/uL    MPV 7.4 6.0 - 12.0 fL   Basic Metabolic Panel w/ Reflex to MG    Collection Time: 07/01/22  5:41 AM   Result Value Ref Range    Glucose 130 (H) 70 - 99 mg/dL    BUN 5 (L) 8 - 23 mg/dL    CREATININE 0.66 0.50 - 0.90 mg/dL    Calcium 8.4 (L) 8.6 - 10.4 mg/dL    Sodium 135 135 - 144 mmol/L    Potassium 4.1 3.7 - 5.3 mmol/L    Chloride 99 98 - 107 mmol/L    CO2 26 20 - 31 mmol/L    Anion Gap 10 9 - 17 mmol/L    GFR Non-African American >60 >60 mL/min    GFR African American >60 >60 mL/min    GFR Comment         POC Glucose Fingerstick    Collection Time: 07/01/22  7:01 AM   Result Value Ref Range    POC Glucose 125 (H) 65 - 105 mg/dL     Recent Labs     06/30/22  0654 06/30/22  1118 06/30/22  1613 06/30/22  2038 07/01/22  0701   POCGLU 108* 137* 114* 133* 125*        CT ABDOMEN PELVIS W IV CONTRAST Additional Contrast? None    Result Date: 6/27/2022  EXAMINATION: CTA OF THE CHEST; CT OF THE ABDOMEN AND PELVIS WITH CONTRAST 6/27/2022 6:09 pm; 6/27/2022 6:20 pm TECHNIQUE: CTA of the chest was performed after the administration of intravenous contrast.  Multiplanar reformatted images are provided for review. MIP images are provided for review. Automated exposure control, iterative reconstruction, and/or weight based adjustment of the mA/kV was utilized to reduce the radiation dose to as low as reasonably achievable.; CT of the abdomen and pelvis was performed with the administration of intravenous contrast. Multiplanar reformatted images are provided for review. Automated exposure control, iterative reconstruction, and/or weight based adjustment of the mA/kV was utilized to reduce the radiation dose to as low as reasonably achievable.  COMPARISON: None CT abdomen pelvis 06/22/2022 HISTORY: ORDERING SYSTEM PROVIDED HISTORY: elevated D dimer, SOB TECHNOLOGIST PROVIDED HISTORY: elevated D dimer, SOB Decision Support Exception - unselect if not a suspected or confirmed emergency medical condition->Emergency Medical Condition (MA) Reason for Exam: elevated D dimer, SOB Relevant Medical/Surgical History: hx. of smoking and COPD. pt. states \"I was here lst week and they said I have cancer of some kind;not sure what kind yet. \"; ORDERING SYSTEM PROVIDED HISTORY: worsened abd pain, hx metastatic ca TECHNOLOGIST PROVIDED HISTORY: worsened abd pain, hx metastatic ca Decision Support Exception - unselect if not a suspected or confirmed emergency medical condition->Emergency Medical Condition (MA) Reason for Exam: pt. c/o SOB, body pains Relevant Medical/Surgical History: hx. of smoking, COPD. pt. states \"I was ere last week, and they said I have some kind of cancer. \" FINDINGS: Chest: Pulmonary Arteries: Pulmonary arteries are adequately opacified for evaluation. There are small filling defects in the right interlobar artery extending into the posterior segmental and subsegmental right upper lobe branches and to a lesser degree into the segmental branches of the medial segment right middle lobe with overall small clot burden. No saddle embolus. Main pulmonary artery is normal in caliber. Mediastinum: No enlarged mediastinal nodes with a lower paratracheal node anterior to the sumit and some subcarinal nodes which are upper limits of normal in caliber. Morphologically abnormal suspicious right supradiaphragmatic lymph nodes. Cardiomegaly. Three-vessel coronary artery calcifications. No pericardial effusion. There is no acute abnormality of the thoracic aorta. Lungs/pleura: Increased right pleural effusion with increased atelectatic changes in the dependent right lung parenchyma, greatest in the right lower lobe. Minimal left lower lobe atelectasis. Indeterminate 4 mm solid noncalcified nodule in the anterior segment left upper lobe.   No pneumothorax or left-sided pleural effusion. Central airways are patent. Soft Tissues/Bones: Degenerative changes without lytic or blastic osseous lesion. Remote median sternotomy which is healed. Calcifications in both breasts with a rim calcified likely oil cyst in the medial right breast.  No axillary adenopathy. Abdomen/Pelvis: Organs: Again noted are innumerable hypodense masses in the liver compatible with hepatic metastases. There is poor delineation of the gallbladder from the adjacent liver masses. There is cholelithiasis as well as choledocholithiasis with intrahepatic and extrahepatic biliary ductal dilatation which is overall similar to 5 days prior. The abnormal soft tissue attenuation extends toward the gastroduodenal junction with loss of the intervening fat plane. The spleen is normal in size and attenuation. The pancreas is atrophic. No peripancreatic inflammatory changes. Adrenal glands remain normal caliber. Kidneys enhance symmetrically aside from a tiny hypodensity in the lateral upper pole left kidney that is too small to definitively characterize. There is no hydronephrosis. Normal caliber ureters. GI/Bowel: The bowel remains normal in caliber without obstruction. An appendix is not definitively identified. Pelvis: The urinary bladder is normal in appearance. Diminutive postmenopausal female pelvic organs. Peritoneum/Retroperitoneum: No free air. Small volume ascites. Irregular peritoneal soft tissue nodularity is redemonstrated compatible with peritoneal carcinomatosis. Enlarged lymph nodes about the yi hepatis. No gross pelvic adenopathy. Infrarenal abdominal aortic aneurysm measuring up to 3.1 cm on a background of moderate to heavy atherosclerosis. Bones/Soft Tissues: Degenerative changes predominating in the mid to lower lumbar facets. No lytic or blastic osseous lesion. Small fat containing umbilical hernia with a peritoneal nodule at the hernia orifice.   Mild anasarca. [CHEST Small pulmonary emboli in the right upper and right middle lobes with an overall small clot burden. Increased right pleural effusion and associated atelectasis of the right lung. Indeterminate 4 mm nodule in the left upper lobe which will warrant attention on follow-up in this patient with findings of intra-abdominal malignancy. Morphologically abnormal appearing supradiaphragmatic lymph nodes on the right suspicious for possible intrathoracic metastatic josh spread. ABDOMEN/PELVIS Findings in the abdomen and pelvis are overall not substantially changed from 5 days prior. Diffuse hepatic metastases with loss of the fat plane between the liver and the gastroduodenal junction worrisome for possible direct extension. Poor delineation of the gallbladder either due to extension of the hepatic disease or possibly due to primary biliary neoplasm. Cholelithiasis and choledocholithiasis with intrahepatic and extrahepatic biliary ductal dilatation, unchanged from recent prior. Peritoneal carcinomatosis and small volume presumably malignant ascites. Infrarenal abdominal aortic aneurysm spanning up to 3.1 cm with recommendations below. Critical results were called by Dr. Samir De Anda to Dr. Emely Lomax on 6/27/2022 at 10:22 PM EST. RECOMMENDATIONS: 3.1 cm infrarenal abdominal aortic aneurysm. Recommend follow-up every 3 years. Reference: J Am Vish Radiol 4176;85:256-587. XR CHEST PORTABLE    Result Date: 6/27/2022  EXAMINATION: ONE XRAY VIEW OF THE CHEST 6/27/2022 4:39 pm COMPARISON: 03/15/2010 HISTORY: ORDERING SYSTEM PROVIDED HISTORY: SOB, CP TECHNOLOGIST PROVIDED HISTORY: SOB, CP Reason for Exam: PT CO cough with SOB and CP X several days. FINDINGS: Postsurgical changes overlying the mediastinum. The cardiac size is normal. Mild bibasal fibrosis and small right pleural effusion. .  Pulmonary vascularity appears normal. There is mild ectasia of the thoracic aorta.  There are degenerative changes in the spine . No acute bony abnormalities. The hilar structures are normal.     Mild bibasal fibrosis and small right pleural effusion. CT CHEST PULMONARY EMBOLISM W CONTRAST    Result Date: 6/27/2022  EXAMINATION: CTA OF THE CHEST; CT OF THE ABDOMEN AND PELVIS WITH CONTRAST 6/27/2022 6:09 pm; 6/27/2022 6:20 pm TECHNIQUE: CTA of the chest was performed after the administration of intravenous contrast.  Multiplanar reformatted images are provided for review. MIP images are provided for review. Automated exposure control, iterative reconstruction, and/or weight based adjustment of the mA/kV was utilized to reduce the radiation dose to as low as reasonably achievable.; CT of the abdomen and pelvis was performed with the administration of intravenous contrast. Multiplanar reformatted images are provided for review. Automated exposure control, iterative reconstruction, and/or weight based adjustment of the mA/kV was utilized to reduce the radiation dose to as low as reasonably achievable. COMPARISON: None CT abdomen pelvis 06/22/2022 HISTORY: ORDERING SYSTEM PROVIDED HISTORY: elevated D dimer, SOB TECHNOLOGIST PROVIDED HISTORY: elevated D dimer, SOB Decision Support Exception - unselect if not a suspected or confirmed emergency medical condition->Emergency Medical Condition (MA) Reason for Exam: elevated D dimer, SOB Relevant Medical/Surgical History: hx. of smoking and COPD. pt. states \"I was here lst week and they said I have cancer of some kind;not sure what kind yet. \"; ORDERING SYSTEM PROVIDED HISTORY: worsened abd pain, hx metastatic ca TECHNOLOGIST PROVIDED HISTORY: worsened abd pain, hx metastatic ca Decision Support Exception - unselect if not a suspected or confirmed emergency medical condition->Emergency Medical Condition (MA) Reason for Exam: pt. c/o SOB, body pains Relevant Medical/Surgical History: hx. of smoking, COPD. pt. states \"I was ere last week, and they said I have some kind of cancer. \" FINDINGS: atrophic. No peripancreatic inflammatory changes. Adrenal glands remain normal caliber. Kidneys enhance symmetrically aside from a tiny hypodensity in the lateral upper pole left kidney that is too small to definitively characterize. There is no hydronephrosis. Normal caliber ureters. GI/Bowel: The bowel remains normal in caliber without obstruction. An appendix is not definitively identified. Pelvis: The urinary bladder is normal in appearance. Diminutive postmenopausal female pelvic organs. Peritoneum/Retroperitoneum: No free air. Small volume ascites. Irregular peritoneal soft tissue nodularity is redemonstrated compatible with peritoneal carcinomatosis. Enlarged lymph nodes about the yi hepatis. No gross pelvic adenopathy. Infrarenal abdominal aortic aneurysm measuring up to 3.1 cm on a background of moderate to heavy atherosclerosis. Bones/Soft Tissues: Degenerative changes predominating in the mid to lower lumbar facets. No lytic or blastic osseous lesion. Small fat containing umbilical hernia with a peritoneal nodule at the hernia orifice. Mild anasarca. [CHEST Small pulmonary emboli in the right upper and right middle lobes with an overall small clot burden. Increased right pleural effusion and associated atelectasis of the right lung. Indeterminate 4 mm nodule in the left upper lobe which will warrant attention on follow-up in this patient with findings of intra-abdominal malignancy. Morphologically abnormal appearing supradiaphragmatic lymph nodes on the right suspicious for possible intrathoracic metastatic josh spread. ABDOMEN/PELVIS Findings in the abdomen and pelvis are overall not substantially changed from 5 days prior. Diffuse hepatic metastases with loss of the fat plane between the liver and the gastroduodenal junction worrisome for possible direct extension.  Poor delineation of the gallbladder either due to extension of the hepatic disease or possibly due to primary biliary neoplasm. Cholelithiasis and choledocholithiasis with intrahepatic and extrahepatic biliary ductal dilatation, unchanged from recent prior. Peritoneal carcinomatosis and small volume presumably malignant ascites. Infrarenal abdominal aortic aneurysm spanning up to 3.1 cm with recommendations below. Critical results were called by Dr. Samir De Anda to Dr. Emely Lomax on 6/27/2022 at 10:22 PM EST. RECOMMENDATIONS: 3.1 cm infrarenal abdominal aortic aneurysm. Recommend follow-up every 3 years. Reference: J Am Vish Radiol 0930;67:952-145. VL DUP LOWER EXTREMITY VENOUS BILATERAL    Result Date: 6/28/2022    [address] [place] [address]  Vascular Lower Extremities DVT Study Procedure   Patient Name    [facility]  Date of Study             06/28/2022                  Obey Gan   Date of Birth   1950   Gender                    Female   Age             81 year(s)   Race                         Room Number     2095   Corporate ID #  [id number]   Patient Acct #  [de-identified]   MR #            [id number]       [facility]   Accession #     [id number]   Interpreting Physician    Marivel Staton   Referring Nurse              Referring Physician       Laura Martin DO  Practitioner  Procedure Type of Study:   Veins: Lower Extremities DVT Study, Venous Scan Lower Bilateral.  Indications for Study:R/O DVT, Leg Swelling and Pulmonary Embolism. Patient Status: In Patient. Technical Quality:Limited visualization. Conclusions   Summary   Technically limited visualization. No evidence of superficial or deep venous thrombosis in both lower  extremities.    Signature   ----------------------------------------------------------------  Electronically signed by Pedro Noonan(Sonographer) on  06/28/2022 09:04 AM  ----------------------------------------------------------------   ----------------------------------------------------------------  Electronically signed by Mono Waldron(Interpreting physician)  on 06/28/2022 05:52 PM  ----------------------------------------------------------------  Findings:   Right Impression:                    Left Impression:  Non visualization of the posterior   Non visualization of the posterior  tibial and peroneal veins. tibial and peroneal veins. The common femoral, femoral,         The common femoral, femoral,  popliteal and tibial veins           popliteal and tibial veins  demonstrate normal compressibility. demonstrate normal compressibility. Normal compressibility of the great  Normal compressibility of the great  saphenous vein. saphenous vein. Normal compressibility of the small  Normal compressibility of the small  saphenous vein. saphenous vein. Velocities are measured in cm/s ; Diameters are measured in cm Right Lower Extremities DVT Study Measurements Right 2D Measurements +------------------------------------+----------+---------------+----------+ ! Location                            ! Visualized! Compressibility! Thrombosis! +------------------------------------+----------+---------------+----------+ ! Common Femoral                      !Yes       ! Yes            ! None      ! +------------------------------------+----------+---------------+----------+ ! Prox Femoral                        !Yes       ! Yes            ! None      ! +------------------------------------+----------+---------------+----------+ ! Mid Femoral                         !Yes       ! Yes            ! None      ! +------------------------------------+----------+---------------+----------+ ! Dist Femoral                        !Yes       ! Yes            ! None      ! +------------------------------------+----------+---------------+----------+ ! Popliteal                           !Yes       ! Yes            ! None      ! +------------------------------------+----------+---------------+----------+ ! Sapheno Femoral Junction            ! Yes       ! Yes !None      ! +------------------------------------+----------+---------------+----------+ ! PTV                                 ! No        !               !          ! +------------------------------------+----------+---------------+----------+ ! Peroneal                            !No        !               !          ! +------------------------------------+----------+---------------+----------+ ! Gastroc                             ! Yes       ! Yes            ! None      ! +------------------------------------+----------+---------------+----------+ ! GSV Thigh                           ! Yes       ! Yes            ! None      ! +------------------------------------+----------+---------------+----------+ ! GSV Knee                            ! Yes       ! Yes            ! None      ! +------------------------------------+----------+---------------+----------+ ! GSV Ankle                           ! Yes       ! Yes            ! None      ! +------------------------------------+----------+---------------+----------+ ! SSV                                 ! Yes       ! Yes            ! None      ! +------------------------------------+----------+---------------+----------+ Right Doppler Measurements +---------------------------+------+------+--------------------------------+ ! Location                   ! Signal!Reflux! Reflux (msec)                   ! +---------------------------+------+------+--------------------------------+ ! Common Femoral             !Phasic!      !                                ! +---------------------------+------+------+--------------------------------+ ! Prox Femoral               !Phasic!      !                                ! +---------------------------+------+------+--------------------------------+ ! Popliteal                  !Phasic!      !                                ! +---------------------------+------+------+--------------------------------+ Left Lower Extremities DVT Study Measurements Left 2D Measurements +------------------------------------+----------+---------------+----------+ ! Location                            ! Visualized! Compressibility! Thrombosis! +------------------------------------+----------+---------------+----------+ ! Common Femoral                      !Yes       ! Yes            ! None      ! +------------------------------------+----------+---------------+----------+ ! Prox Femoral                        !Yes       ! Yes            ! None      ! +------------------------------------+----------+---------------+----------+ ! Mid Femoral                         !Yes       ! Yes            ! None      ! +------------------------------------+----------+---------------+----------+ ! Dist Femoral                        !Yes       ! Yes            ! None      ! +------------------------------------+----------+---------------+----------+ ! Popliteal                           !Yes       ! Yes            ! None      ! +------------------------------------+----------+---------------+----------+ ! Sapheno Femoral Junction            ! Yes       ! Yes            ! None      ! +------------------------------------+----------+---------------+----------+ ! PTV                                 ! No        !               !          ! +------------------------------------+----------+---------------+----------+ ! Peroneal                            !No        !               !          ! +------------------------------------+----------+---------------+----------+ ! Gastroc                             ! Yes       ! Yes            ! None      ! +------------------------------------+----------+---------------+----------+ ! GSV Thigh                           ! Yes       ! Yes            ! None      ! +------------------------------------+----------+---------------+----------+ ! GSV Knee                            ! Yes       ! Yes            ! None      ! +------------------------------------+----------+---------------+----------+ ! GSV Ankle !Yes       !Yes            ! None      ! +------------------------------------+----------+---------------+----------+ ! SSV                                 ! Yes       ! Yes            ! None      ! +------------------------------------+----------+---------------+----------+ Left Doppler Measurements +---------------------------+------+------+--------------------------------+ ! Location                   ! Signal!Reflux! Reflux (msec)                   ! +---------------------------+------+------+--------------------------------+ ! Common Femoral             !Phasic!      !                                ! +---------------------------+------+------+--------------------------------+ ! Prox Femoral               !Phasic!      !                                ! +---------------------------+------+------+--------------------------------+ ! Popliteal                  !Phasic!      !                                ! +---------------------------+------+------+--------------------------------+            On admission         Review of Systems:     As recorded in HPI     Patient complains of uncontrolled abdominal pain 10 out of 10 with movement manageable when at rest  Patient complains of epigastric pain which was reproducible no chest pain       Physical Examination:        Vitals:    06/30/22 2315 07/01/22 0415 07/01/22 0705 07/01/22 0740   BP: 92/64  105/65    Pulse: 72  73 76   Resp: 20  20 20   Temp: 98 °F (36.7 °C)  97.7 °F (36.5 °C)    TempSrc: Oral  Oral    SpO2: 92%  90% 94%   Weight:  217 lb 9.5 oz (98.7 kg)     Height:           Recent Labs     06/30/22  1118 06/30/22  1613 06/30/22  2038 07/01/22  0701   POCGLU 137* 114* 133* 125*       Intake/Output Summary (Last 24 hours) at 7/1/2022 7266  Last data filed at 7/1/2022 0827  Gross per 24 hour   Intake 470 ml   Output --   Net 470 ml       General Appearance:  alert, well appearing, and in no acute distress, central obesity present  Mental status: Head:  normocephalic, atraumatic. Eye: no icterus, redness, pupils equal and reactive, extraocular eye movements intact, conjunctiva clear  Ear: normal external ear, no discharge, hearing intact  Nose:  no drainage noted  Mouth: mucous membranes moist  Neck: supple, no carotid bruits, thyroid not palpable  Lungs: Bilateral equal air entry, clear to ausculation, no wheezing, rales or rhonchi, normal effort  Cardiovascular: normal rate, regular rhythm, no murmur, gallop, rub. Abdomen: Soft, tenderness in right upper quadrant, epigastric area, liver palpable under right rib cage  Neurologic: There are no new focal motor or sensory deficits,   Skin: No gross lesions, rashes, bruising or bleeding on exposed skin area  Extremities:  peripheral pulses palpable, no pedal edema or calf pain with palpation  Psych: flat affect            Data:     PLabs:    BMP:   Recent Labs     06/30/22  0757 07/01/22  0541    135   K 3.4* 4.1   CO2 26 26   BUN 4* 5*   CREATININE 0.61 0.66   LABGLOM >60 >60   GLUCOSE 133* 130*                 Medications: Allergies:     Allergies   Allergen Reactions    Chantix [Varenicline Tartrate]      Nightmares     Sulphadimidine [Sulfamethazine]        Current Meds:   Scheduled Meds:    apixaban  10 mg Oral BID    Followed by   Yumiko Edgar ON 7/7/2022] apixaban  5 mg Oral BID    clopidogrel  75 mg Oral Daily    sodium chloride flush  5-40 mL IntraVENous 2 times per day    ARIPiprazole  5 mg Oral Daily    atorvastatin  80 mg Oral Daily    metoprolol succinate  50 mg Oral Daily    budesonide-formoterol  2 puff Inhalation BID    fluticasone  2 spray Nasal Daily    gabapentin  600 mg Oral TID    lisinopril  10 mg Oral Daily    therapeutic multivitamin-minerals  1 tablet Oral Daily    pantoprazole  40 mg Oral QAM AC    oxybutynin  5 mg Oral BID    ranolazine  500 mg Oral BID    tiotropium  2 puff Inhalation Daily    venlafaxine  150 mg Oral Daily    venlafaxine  75 mg Oral Daily Continuous Infusions:    sodium chloride       PRN Meds: melatonin, HYDROmorphone, sodium chloride flush, sodium chloride, magnesium hydroxide, acetaminophen **OR** acetaminophen, albuterol sulfate HFA, oxyCODONE, traZODone, potassium chloride **OR** potassium alternative oral replacement **OR** potassium chloride, perflutren lipid microspheres, sodium chloride flush          Assessment:        Primary Problem  Pulmonary embolus (HCC)    Principal Problem:    Pulmonary embolus (HCC)  Active Problems:    Liver mass    Diabetes mellitus type 2 in obese ([address]    Morbid obesity ([facility] [address]  Resolved Problems:    * No resolved hospital problems.  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  She was discharged recently readmitted with the chest pain and found to have pulmonary embolism likely secondary to metastatic disease  Patient had a liver biopsy done 2 days ago result is pending  Patient is peritoneal carcinomatosis pain is uncontrolled we added OxyContin 10 controlled release twice a day with a as needed oxycodone  Also patient is anxious for labs Xanax low-dose as needed  Patient complained of atypical chest pain history of coronary artery disease and stable angina on ranolazine EKG noted no ST elevation noted  We will order repeat EKG a set of troponin  Given extensive malignancy history which will be conservative treatment unless ST elevation  Cardiology consult          Michael Peralta MD  7/1/2022

## 2022-07-01 NOTE — PLAN OF CARE
Problem: Discharge Planning  Goal: Discharge to home or other facility with appropriate resources  Outcome: Progressing     Problem: Pain  Goal: Verbalizes/displays adequate comfort level or baseline comfort level  Outcome: Progressing  Note: Patient with mid lower abdomen pain. Medicated with dilaudid with relief. Problem: Safety - Adult  Goal: Free from fall injury  Outcome: Progressing  Note: Patient weak. Alert and oriented. Bed alarm on. Assisted to bathroom with one assist.     Problem: ABCDS Injury Assessment  Goal: Absence of physical injury  Outcome: Progressing     Problem: Chronic Conditions and Co-morbidities  Goal: Patient's chronic conditions and co-morbidity symptoms are monitored and maintained or improved  Outcome: Progressing     Problem: Skin/Tissue Integrity  Goal: Absence of new skin breakdown  Description: 1. Monitor for areas of redness and/or skin breakdown  2. Assess vascular access sites hourly  3. Every 4-6 hours minimum:  Change oxygen saturation probe site  4. Every 4-6 hours:  If on nasal continuous positive airway pressure, respiratory therapy assess nares and determine need for appliance change or resting period. Outcome: Progressing  Note: Buttocks and coccyx reddened. Zinc cream applied. Alternating pressure mattress applied.   Patient encouraged to turn and reposition

## 2022-07-01 NOTE — CARE COORDINATION
SW spoke with [employer]. SW provided updates as requested. Authorization for [id number] still pending.  Electronically signed by Merlin Beers on 7/1/2022 at 10:06 AM
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
## 2022-07-01 NOTE — PROGRESS NOTES
Occupational Therapy  Facility/Department: [facility] PROGRESSIVE CARE  Daily Treatment Note  NAME: Kendall [address]  : 1950  MRN: [id number]    Date of Service: 2022    Discharge Recommendations:  Patient would benefit from continued therapy after discharge  OT Equipment Recommendations  Other: TBD      Patient Diagnosis(es): The primary encounter diagnosis was Other acute pulmonary embolism without acute cor pulmonale ([address] A diagnosis of Liver mass was also pertinent to this visit. Assessment    Activity Tolerance: Patient tolerated treatment well;Patient limited by endurance; Patient limited by fatigue  Discharge Recommendations: Patient would benefit from continued therapy after discharge  Other: TBD      Plan   Plan  Times per Week: 4-5  Current Treatment Recommendations: ROM; Strengthening;Balance training;Functional mobility training; Endurance training; Safety education & training;Patient/Caregiver education & training;Equipment evaluation, education, & procurement;Self-Care / ADL; Home management training     Restrictions  Restrictions/Precautions  Restrictions/Precautions: General Precautions; Fall Risk;Bed Alarm  Required Braces or Orthoses?: No  Implants present? : Metal implants    Subjective   Subjective  Subjective: \"Let's get this over with. \" pt req min encouragement t participate with therapy this date  Orientation  Overall Orientation Status: Within Functional Limits  Pain: 7/10 Pain directly below diaphram.   Cognition  Overall Cognitive Status: WFL        Objective    Vitals  Vitals  BP Location: Left lower arm  O2 Device: None (Room air)  Bed Mobility Training  Bed Mobility Training: Yes  Interventions: Verbal cues  Rolling: Supervision  Supine to Sit: Supervision  Sit to Supine: Supervision  Scooting: Supervision  Balance  Sitting: Intact (pt tolerates sitting EOB unsupported x 15 min durUEstrengthe)  Standing: Without support (pt tolerates standing EOB for reaching activity ~3 minutes, )  Transfer Training  Transfer Training: Yes  Overall Level of Assistance: Stand-by assistance  Sit to Stand: Stand-by assistance  Stand to Sit: Stand-by assistance     ADL  Feeding: Setup  Grooming: Setup  UE Bathing: Stand by assistance  LE Bathing: Minimal assistance  UE Dressing: Stand by assistance  LE Dressing: Minimal assistance  Toileting: Minimal assistance  Additional Comments: ADL scores based on skilled observation and clinical reasoning, unless otherwise noted. Pt donned socks seated EOB, increased time to complete d/t SOB and limited mobility. Patient Education  Education Given To: Patient  Education Provided: Plan of Care; Fall Prevention Strategies; Role of Therapy;Transfer Training  Education Method: Verbal  Barriers to Learning: None  Education Outcome: Verbalized understanding;Continued education needed    Goals  Short Term Goals  Time Frame for Short term goals: By discharge  Short Term Goal 1: Pt will perform BADLs mod I and Good safety  Short Term Goal 2: Pt will perform transfers/functional mobility mod I, using least restrictive device, and Good safety  Short Term Goal 3: Pt will V/D use of energy conservation/work simplifcation techniques to increase safety and independence during self care  Short Term Goal 4: Pt will V/D fall prevention/home safety strategies that are applicable to her daily routine  Short Term Goal 5: Pt will demonstrate improved standing tolerance/balance AEB ability to tolerate standing for 3-5 minutes during 1-2 handed functional tasks with no LOB and mod I.    Short Term Goal 6: Pt will actively participate in 15+ minutes of therapeutic exercise/functional activity to promote safety and independence with self care and mobility       Therapy Time   Individual Concurrent Group Co-treatment   Time In 1356         Time Out 1420         Minutes DELISA Camarena

## 2022-07-01 NOTE — PLAN OF CARE
Problem: Safety - Adult  Goal: Free from fall injury  7/1/2022 1713 by Kati Hernandez RN  Outcome: Progressing  Note: Free from falls this shift, bed in lowest position, bed alarm on, non skid socks on, call light within reach, hourly rounding performed, patient instructed to call out for assistance getting out of bed.       Problem: Pain  Goal: Verbalizes/displays adequate comfort level or baseline comfort level  7/1/2022 1713 by Kati Hernandez RN  Outcome: Progressing  Flowsheets (Taken 7/1/2022 1713)  Verbalizes/displays adequate comfort level or baseline comfort level: Administer analgesics based on type and severity of pain and evaluate response     Problem: Chronic Conditions and Co-morbidities  Goal: Patient's chronic conditions and co-morbidity symptoms are monitored and maintained or improved  7/1/2022 1713 by Radha Landin RN  Outcome: Progressing

## 2022-07-01 NOTE — TELEPHONE ENCOUNTER
Name: Haley Costa  : 1950  MRN: [id number]    Oncology Navigation Follow-Up Note    Contact Type:  Telephone  Notes: Upon review of chart noted pt continues admitted &  liver bx pending. Will continue to follow & contact pt upon d/c.     Electronically signed by Serena Alcantar RN on 2022 at 9:05 AM
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
## 2022-07-02 LAB
ANION GAP SERPL CALCULATED.3IONS-SCNC: 9 MMOL/L (ref 9–17)
BUN BLDV-MCNC: 6 MG/DL (ref 8–23)
CALCIUM SERPL-MCNC: 8.6 MG/DL (ref 8.6–10.4)
CHLORIDE BLD-SCNC: 98 MMOL/L (ref 98–107)
CO2: 28 MMOL/L (ref 20–31)
CREAT SERPL-MCNC: 0.65 MG/DL (ref 0.5–0.9)
EKG ATRIAL RATE: 72 BPM
EKG P AXIS: 58 DEGREES
EKG P-R INTERVAL: 140 MS
EKG Q-T INTERVAL: 430 MS
EKG QRS DURATION: 114 MS
EKG QTC CALCULATION (BAZETT): 470 MS
EKG R AXIS: 69 DEGREES
EKG T AXIS: 157 DEGREES
EKG VENTRICULAR RATE: 72 BPM
GFR AFRICAN AMERICAN: >60 ML/MIN
GFR NON-AFRICAN AMERICAN: >60 ML/MIN
GFR SERPL CREATININE-BSD FRML MDRD: ABNORMAL ML/MIN/{1.73_M2}
GLUCOSE BLD-MCNC: 120 MG/DL (ref 70–99)
GLUCOSE BLD-MCNC: 130 MG/DL (ref 65–105)
GLUCOSE BLD-MCNC: 131 MG/DL (ref 65–105)
GLUCOSE BLD-MCNC: 139 MG/DL (ref 65–105)
POTASSIUM SERPL-SCNC: 4.9 MMOL/L (ref 3.7–5.3)
SODIUM BLD-SCNC: 135 MMOL/L (ref 135–144)

## 2022-07-02 PROCEDURE — 94761 N-INVAS EAR/PLS OXIMETRY MLT: CPT

## 2022-07-02 PROCEDURE — 2580000003 HC RX 258: Performed by: NURSE PRACTITIONER

## 2022-07-02 PROCEDURE — 2060000000 HC ICU INTERMEDIATE R&B

## 2022-07-02 PROCEDURE — 97535 SELF CARE MNGMENT TRAINING: CPT

## 2022-07-02 PROCEDURE — 80048 BASIC METABOLIC PNL TOTAL CA: CPT

## 2022-07-02 PROCEDURE — 6360000002 HC RX W HCPCS: Performed by: NURSE PRACTITIONER

## 2022-07-02 PROCEDURE — 94640 AIRWAY INHALATION TREATMENT: CPT

## 2022-07-02 PROCEDURE — 93010 ELECTROCARDIOGRAM REPORT: CPT | Performed by: INTERNAL MEDICINE

## 2022-07-02 PROCEDURE — 36415 COLL VENOUS BLD VENIPUNCTURE: CPT

## 2022-07-02 PROCEDURE — 6370000000 HC RX 637 (ALT 250 FOR IP): Performed by: INTERNAL MEDICINE

## 2022-07-02 PROCEDURE — 97110 THERAPEUTIC EXERCISES: CPT

## 2022-07-02 PROCEDURE — 82947 ASSAY GLUCOSE BLOOD QUANT: CPT

## 2022-07-02 PROCEDURE — 2700000000 HC OXYGEN THERAPY PER DAY

## 2022-07-02 PROCEDURE — 6370000000 HC RX 637 (ALT 250 FOR IP): Performed by: NURSE PRACTITIONER

## 2022-07-02 PROCEDURE — 97530 THERAPEUTIC ACTIVITIES: CPT

## 2022-07-02 PROCEDURE — 99232 SBSQ HOSP IP/OBS MODERATE 35: CPT | Performed by: INTERNAL MEDICINE

## 2022-07-02 RX ORDER — LISINOPRIL 5 MG/1
5 TABLET ORAL DAILY
Status: DISCONTINUED | OUTPATIENT
Start: 2022-07-03 | End: 2022-07-05 | Stop reason: HOSPADM

## 2022-07-02 RX ADMIN — OXYBUTYNIN CHLORIDE 5 MG: 5 TABLET ORAL at 21:14

## 2022-07-02 RX ADMIN — SODIUM CHLORIDE, PRESERVATIVE FREE 10 ML: 5 INJECTION INTRAVENOUS at 22:48

## 2022-07-02 RX ADMIN — SODIUM CHLORIDE, PRESERVATIVE FREE 10 ML: 5 INJECTION INTRAVENOUS at 09:58

## 2022-07-02 RX ADMIN — OXYCODONE HYDROCHLORIDE 10 MG: 10 TABLET, FILM COATED, EXTENDED RELEASE ORAL at 09:51

## 2022-07-02 RX ADMIN — TIOTROPIUM BROMIDE INHALATION SPRAY 2 PUFF: 3.12 SPRAY, METERED RESPIRATORY (INHALATION) at 07:10

## 2022-07-02 RX ADMIN — PANTOPRAZOLE SODIUM 40 MG: 40 TABLET, DELAYED RELEASE ORAL at 06:32

## 2022-07-02 RX ADMIN — GABAPENTIN 600 MG: 600 TABLET, FILM COATED ORAL at 14:55

## 2022-07-02 RX ADMIN — LISINOPRIL 10 MG: 10 TABLET ORAL at 09:55

## 2022-07-02 RX ADMIN — ARIPIPRAZOLE 5 MG: 5 TABLET ORAL at 09:58

## 2022-07-02 RX ADMIN — BUDESONIDE AND FORMOTEROL FUMARATE DIHYDRATE 2 PUFF: 160; 4.5 AEROSOL RESPIRATORY (INHALATION) at 07:11

## 2022-07-02 RX ADMIN — RANOLAZINE 500 MG: 500 TABLET, EXTENDED RELEASE ORAL at 09:52

## 2022-07-02 RX ADMIN — VENLAFAXINE HYDROCHLORIDE 150 MG: 150 CAPSULE, EXTENDED RELEASE ORAL at 09:52

## 2022-07-02 RX ADMIN — ATORVASTATIN CALCIUM 80 MG: 80 TABLET, FILM COATED ORAL at 09:54

## 2022-07-02 RX ADMIN — APIXABAN 10 MG: 5 TABLET, FILM COATED ORAL at 21:13

## 2022-07-02 RX ADMIN — SODIUM CHLORIDE, PRESERVATIVE FREE 10 ML: 5 INJECTION INTRAVENOUS at 21:17

## 2022-07-02 RX ADMIN — OXYCODONE HYDROCHLORIDE 10 MG: 10 TABLET, FILM COATED, EXTENDED RELEASE ORAL at 21:14

## 2022-07-02 RX ADMIN — HYDROMORPHONE HYDROCHLORIDE 0.5 MG: 1 INJECTION, SOLUTION INTRAMUSCULAR; INTRAVENOUS; SUBCUTANEOUS at 22:48

## 2022-07-02 RX ADMIN — METOPROLOL SUCCINATE 50 MG: 50 TABLET, EXTENDED RELEASE ORAL at 09:53

## 2022-07-02 RX ADMIN — BUDESONIDE AND FORMOTEROL FUMARATE DIHYDRATE 2 PUFF: 160; 4.5 AEROSOL RESPIRATORY (INHALATION) at 20:10

## 2022-07-02 RX ADMIN — ALPRAZOLAM 0.5 MG: 0.5 TABLET ORAL at 21:13

## 2022-07-02 RX ADMIN — CLOPIDOGREL BISULFATE 75 MG: 75 TABLET ORAL at 09:52

## 2022-07-02 RX ADMIN — RANOLAZINE 500 MG: 500 TABLET, EXTENDED RELEASE ORAL at 21:13

## 2022-07-02 RX ADMIN — GABAPENTIN 600 MG: 600 TABLET, FILM COATED ORAL at 21:13

## 2022-07-02 RX ADMIN — VENLAFAXINE HYDROCHLORIDE 75 MG: 75 CAPSULE, EXTENDED RELEASE ORAL at 10:00

## 2022-07-02 RX ADMIN — APIXABAN 10 MG: 5 TABLET, FILM COATED ORAL at 09:54

## 2022-07-02 RX ADMIN — GABAPENTIN 600 MG: 600 TABLET, FILM COATED ORAL at 09:53

## 2022-07-02 RX ADMIN — MULTIPLE VITAMINS W/ MINERALS TAB 1 TABLET: TAB at 09:53

## 2022-07-02 RX ADMIN — OXYBUTYNIN CHLORIDE 5 MG: 5 TABLET ORAL at 09:55

## 2022-07-02 ASSESSMENT — PAIN SCALES - GENERAL
PAINLEVEL_OUTOF10: 7
PAINLEVEL_OUTOF10: 10
PAINLEVEL_OUTOF10: 8
PAINLEVEL_OUTOF10: 8

## 2022-07-02 ASSESSMENT — PAIN DESCRIPTION - DESCRIPTORS
DESCRIPTORS: SHARP;SHOOTING
DESCRIPTORS: SHARP

## 2022-07-02 ASSESSMENT — PAIN DESCRIPTION - FREQUENCY: FREQUENCY: INTERMITTENT

## 2022-07-02 ASSESSMENT — PAIN DESCRIPTION - LOCATION
LOCATION: GROIN;HIP
LOCATION: CHEST
LOCATION: GROIN

## 2022-07-02 ASSESSMENT — PAIN DESCRIPTION - ORIENTATION
ORIENTATION: RIGHT
ORIENTATION: RIGHT

## 2022-07-02 NOTE — PROGRESS NOTES
Patient complains of a intermittent sharp shooting pain down right leg.  No redness or swelling noted

## 2022-07-02 NOTE — PROGRESS NOTES
Occupational Therapy  Facility/Department: [facility] PROGRESSIVE CARE  Daily Treatment Note  NAME: Maksim Pederson  : 1950  MRN: [id number]    Date of Service: 2022    Discharge Recommendations:  Patient would benefit from continued therapy after discharge  OT Equipment Recommendations  Other: TBD      Patient Diagnosis(es): The primary encounter diagnosis was Other acute pulmonary embolism without acute cor pulmonale ([address] A diagnosis of Liver mass was also pertinent to this visit. Assessment    Activity Tolerance: Patient tolerated treatment well  Discharge Recommendations: Patient would benefit from continued therapy after discharge  Other: TBD      Plan   Plan  Times per Week: 4-5  Current Treatment Recommendations: ROM; Strengthening;Balance training;Functional mobility training; Endurance training; Safety education & training;Patient/Caregiver education & training;Equipment evaluation, education, & procurement;Self-Care / ADL; Home management training     Restrictions       Subjective   Subjective  Subjective: \"Yeah we can do that\" Pt is pleasant and agreeable the therapy. RN Shelli Mukherjee ok's tx.    Orientation  Overall Orientation Status: Within Functional Limits  Pain: 10/10 R groin - intermittent  Cognition  Overall Cognitive Status: WFL        Objective    Vitals     Bed Mobility Training  Bed Mobility Training: Yes  Overall Level of Assistance: Modified independent  Interventions: Verbal cues  Rolling: Modified independent  Supine to Sit: Modified independent  Sit to Supine: Supervision  Scooting: Modified independent  Balance  Sitting: With support  Standing: Without support (CGA for safety)  Transfer Training  Transfer Training: Yes  Overall Level of Assistance: Stand-by assistance  Sit to Stand: Stand-by assistance  Stand to Sit: Stand-by assistance  Bed to Chair: Contact-guard assistance  Gait Training  Gait Training: Yes  Gait  Overall Level of Assistance: Contact-guard assistance (VC for O2 tubing [address], [place]/L

## 2022-07-02 NOTE — FLOWSHEET NOTE
Writer had seen patient last admission right after she got cancer dx, and patient remembered that visit. She said she is less overwhelmed but still seems like a bad dream. There is no specific POC yet as still waiting for biopsy results. She said she is very much able to make her own decisions and her kids are struggling with \"all of this. \" She expects to make her son HPOA as she knows her daughter would do want she wants and not what patient wants. Writer explained again that as it currently stands w/o documents, 2 of her 3 children would need to agree if patient is unable to speak for herself. Pt wants to talk to her son more explicitly and then decide. Writer has her own spirituality and does not want prayer but appreciates emotional support and listening presence. 07/02/22 1412   Encounter Summary   Encounter Overview/Reason  Spiritual/Emotional Needs   Service Provided For: Patient   Referral/Consult From: Palliative Care   Support System Children   Last Encounter  07/02/22   Complexity of Encounter Moderate   Palliative Care   Type Palliative Care, Follow-up   Advance Care Planning   Type ACP conversation   Assessment/Intervention/Outcome   Assessment Calm; Impaired resilience   Intervention Active listening;Discussed illness injury and its impact; Explored/Affirmed feelings, thoughts, concerns;Explored Coping Skills/Resources;Sustaining Presence/Ministry of presence; Discussed belief system/Orthodox practices/michelle   Outcome Engaged in conversation;Expressed feelings, needs, and concerns;Expressed Gratitude;New perspective/awareness;Receptive

## 2022-07-02 NOTE — PROGRESS NOTES
[address], [address] [facility] [place]  [phone]          Progress Note    Patient Name:  Dilia Michael    :  1950 10:29 AM      SUBJECTIVE       Ms. Angel Nielsen continues to have significant pleuritic chest pain. With cough and deep breathing it hurts a lot. No chest pain otherwise. I reviewed monitor, shows a sinus rhythm. Otherwise she denies any shortness of breath, palpitations or dizziness. OBJECTIVE     Vital signs:    BP 92/67   Pulse 77   Temp 98.4 °F (36.9 °C) (Oral)   Resp 16   Ht 5' (1.524 m)   Wt 221 lb 5.5 oz (100.4 kg)   SpO2 95%   BMI 43.23 kg/m²  2 L/min  .tro    Admit Weight:  199 lb 15.3 oz (90.7 kg)    Last 3 weights: Wt Readings from Last 3 Encounters:   22 221 lb 5.5 oz (100.4 kg)   22 186 lb 1.1 oz (84.4 kg)   20 215 lb 3.2 oz (97.6 kg)       BMI: Body mass index is 43.23 kg/m².     Input/Output:       Intake/Output Summary (Last 24 hours) at 2022 1029  Last data filed at 2022 0647  Gross per 24 hour   Intake 120 ml   Output 300 ml   Net -180 ml       Date 22 0000 - 22 2359   Shift 9346-1261 5476-8555 6406-3909 24 Hour Total   INTAKE   Shift Total(mL/kg)       OUTPUT   Urine(mL/kg/hr) 300(0.4)   300   Shift Total(mL/kg) 300(3)   300(3)   Weight (kg) 100.4 100.4 100.4 100.4     Exam:     General appearance: awake and alert moves all ext   Lungs: no rhonchi, no wheezes, no rales  Heart: S1 and S2 no murmur  Abdomen: positive bowel sounds, no bruits, no masses  Extremities: warm and dry, no cyanosis, no clubbing        Laboratory Studies:     CBC:   Recent Labs     22  0541   WBC 17.1*   HGB 11.2*   HCT 34.8*   MCV 89.1   *     BMP:   Recent Labs     22  0757 22  0541 22  0532    135 135   K 3.4* 4.1 4.9   CL 99 99 98   CO2 26 26 28   BUN 4* 5* 6*   CREATININE 0.61 0.66 0.65     PT/INR: No results for input(s): PROTIME, INR in the last 72 hours.  APTT: No results for input(s): APTT in the last 72 hours. MAG:   Recent Labs     22  0757   MG 1.6     D Dimer: No results for input(s): DDIMER in the last 72 hours. Troponin    Recent Labs     22  1712 22  1839 22  0850   TROPHS 14 12 11                BNP No results for input(s): BNP in the last 72 hours. No results for input(s): PROBNP in the last 72 hours. Pulse Ox: SpO2  Av.7 %  Min: 88 %  Max: 95 %  Supplemental O2: O2 Flow Rate (L/min): 2 L/min     Current Meds:    oxyCODONE  10 mg Oral 2 times per day    apixaban  10 mg Oral BID    Followed by   Lauren Matute ON 2022] apixaban  5 mg Oral BID    clopidogrel  75 mg Oral Daily    sodium chloride flush  5-40 mL IntraVENous 2 times per day    ARIPiprazole  5 mg Oral Daily    atorvastatin  80 mg Oral Daily    metoprolol succinate  50 mg Oral Daily    budesonide-formoterol  2 puff Inhalation BID    fluticasone  2 spray Nasal Daily    gabapentin  600 mg Oral TID    lisinopril  10 mg Oral Daily    therapeutic multivitamin-minerals  1 tablet Oral Daily    pantoprazole  40 mg Oral QAM AC    oxybutynin  5 mg Oral BID    ranolazine  500 mg Oral BID    tiotropium  2 puff Inhalation Daily    venlafaxine  150 mg Oral Daily    venlafaxine  75 mg Oral Daily     Continuous Infusions:    sodium chloride         Echo:    Assessment and plan:  70-year-old female with past medical history of atherosclerotic heart disease status post CABG in , essential hypertension, diabetes, morbid obesity. Patient presented with chest pain, found to have small right pulmonary embolism. Chest pain is pleuritic and attributed to pulmonary embolism. Echocardiogram essentially unremarkable. Preserved ejection fraction, normal right ventricle. Labs reviewed. Blood pressure borderline. I will decrease lisinopril dose to 5 mg daily. Anticoagulation has been started. Continue Plavix and Lipitor.   No further cardiac work-up at this point. We will sign off, see patient in clinic. Thanks for the consult, call us with any questions.         Electronically signed by Chava Luciano MD on 7/2/2022 at 10:29 AM

## 2022-07-02 NOTE — ACP (ADVANCE CARE PLANNING)
Advance Care Planning   Healthcare Decision Maker:    Currently would be her 3 children; however pt is considering executing documents with her son as primary HPOA. Patient's wishes for care are still in line with a full code.

## 2022-07-02 NOTE — PROGRESS NOTES
Today's Date: 7/2/2022  Patient Name: Eben Hendrickson  Date of admission: 6/27/2022  6:49 PM  Patient's age: 81 y.o., 1950  Admission Dx: Pulmonary embolism on right [facility]) [I26.99]    Reason for Consult: PE  Requesting Physician: Salena Hernandez MD    CHIEF COMPLAINT:    Chief Complaint   Patient presents with    Fatigue    Abdominal Pain     SUBJECTIVE:  .patient was seen and examined. No events overnight. Pain is controlled on current treatment. No fever. No hemoptysis. No other complaints. BRIEF CASE HISTORY:    Eben Hendrickson is a 81 y.o. female who is admitted to the hospital on 6/27/2022  for fatigue and abd pain. Patient was recently admitted to hospital about a week ago and at that time she had a CT scan of the abdomen which showed multiple liver lesions suspicious for metastasis. She was evaluated by oncology. Her tumor markers showed very high CA 19-9 and CEA and AFP. 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 IR guided biopsy was ordered but because patient was on Plavix plan was to do biopsy as outpatient. CT chest abdomen pelvis and CT chest showed small pulmonary emboli in the right upper and right middle lobes with very small clot burden. Oncology consulted for further evaluation. Past Medical History:   has a past medical history of Acid reflux, Arthritis, Cataracts, bilateral, CHF (congestive heart failure) ([address] COPD (chronic obstructive pulmonary disease) ([address] Gall stones, Head injury, Hyperlipidemia, Hypertension, Insomnia, MI (myocardial infarction) ([address] Neuropathy, Sleep apnea, and Type 2 diabetes mellitus without complication ([address] Past Surgical History:   has a past surgical history that includes Femur Surgery; Foot surgery (Left); Mandible fracture surgery; Cardiac surgery (1999); Ankle surgery (Left); Tonsillectomy; Coronary angioplasty with stent (1999); and IR BIOPSY LIVER PERCUTANEOUS (6/29/2022).      Medications:    Prior to MG/DOSE, (OZEMPIC, 1 MG/DOSE,) 2 MG/1.5ML SOPN Inject 1 mg per week 9/30/20   BaltazarBENNETT Serrato CNP   Multiple Vitamins-Minerals (THERAPEUTIC MULTIVITAMIN-MINERALS) tablet Take 1 tablet by mouth daily 9/30/20   BENNETT Farias CNP   Melatonin 5 MG SUBL Place 1 applicator under the tongue nightly as needed (insomnia) 9/30/20   BENNETT Farias CNP   Insulin Pen Needle 32G X 4 MM MISC 1 each by Does not apply route once a week 3/12/20   Miya Small MD   fluticasone Tacy Moras) 50 MCG/ACT nasal spray 2 sprays by Nasal route daily 9/13/19   Miya Small MD     Current Facility-Administered Medications   Medication Dose Route Frequency Provider Last Rate Last Admin    [START ON 7/3/2022] lisinopril (PRINIVIL;ZESTRIL) tablet 5 mg  5 mg Oral Daily Neal Martinez MD        oxyCODONE (OXYCONTIN) extended release tablet 10 mg  10 mg Oral 2 times per day Pam Fritz MD   10 mg at 07/02/22 0951    ALPRAZolam (XANAX) tablet 0.5 mg  0.5 mg Oral TID PRN Pam Fritz MD        apixaban (ELIQUIS) tablet 10 mg  10 mg Oral BID Winnie Closs, MD   10 mg at 07/02/22 0954    Followed by   Darryle Norris ON 7/7/2022] apixaban (ELIQUIS) tablet 5 mg  5 mg Oral BID Winnie Closs, MD        clopidogrel (PLAVIX) tablet 75 mg  75 mg Oral Daily Winnie Closs, MD   75 mg at 07/02/22 2130    melatonin tablet 6 mg  6 mg Oral Nightly PRN BENNETT Mclaughlin CNP   6 mg at 06/30/22 2316    HYDROmorphone (DILAUDID) injection 0.5 mg  0.5 mg IntraVENous Q4H PRN BENNETT Mclaughlin CNP   0.5 mg at 07/01/22 0535    sodium chloride flush 0.9 % injection 5-40 mL  5-40 mL IntraVENous 2 times per day BENNETT Mclaughlin CNP   10 mL at 07/02/22 0958    sodium chloride flush 0.9 % injection 10 mL  10 mL IntraVENous PRN BENNETT Mclaughlin - CNP        0.9 % sodium chloride infusion   IntraVENous PRN Juan Denis APRN - CNP        magnesium hydroxide (MILK OF MAGNESIA) 400 MG/5ML suspension 30 mL  30 mL Oral Daily PRN Doc Shall, APRN - CNP        acetaminophen (TYLENOL) tablet 650 mg  650 mg Oral Q6H PRN Doc Torres, APRN - CNP        Or    acetaminophen (TYLENOL) suppository 650 mg  650 mg Rectal Q6H PRN Doc Torres, APRN - CNP        albuterol sulfate HFA (PROVENTIL;VENTOLIN;PROAIR) 108 (90 Base) MCG/ACT inhaler 2 puff  2 puff Inhalation Q6H PRN Doc Torres, APRN - CNP   2 puff at 07/01/22 1935    ARIPiprazole (ABILIFY) tablet 5 mg  5 mg Oral Daily Doc Torres, APRN - CNP   5 mg at 07/02/22 6361    atorvastatin (LIPITOR) tablet 80 mg  80 mg Oral Daily Doc Torres, APRN - CNP   80 mg at 07/02/22 2776    metoprolol succinate (TOPROL XL) extended release tablet 50 mg  50 mg Oral Daily Doc Torres, APRN - CNP   50 mg at 07/02/22 1892    budesonide-formoterol (SYMBICORT) 160-4.5 MCG/ACT inhaler 2 puff  2 puff Inhalation BID Doc Torres APRN - CNP   2 puff at 07/02/22 0711    fluticasone (FLONASE) 50 MCG/ACT nasal spray 2 spray  2 spray Nasal Daily BENNETT Gregorio - CNP   2 spray at 07/01/22 1051    gabapentin (NEURONTIN) tablet 600 mg  600 mg Oral TID Doc Torres, APRN - CNP   600 mg at 07/02/22 1455    therapeutic multivitamin-minerals 1 tablet  1 tablet Oral Daily Doc Torres, APRN - CNP   1 tablet at 07/02/22 8512    pantoprazole (PROTONIX) tablet 40 mg  40 mg Oral QAM AC Doc Torres, APRN - CNP   40 mg at 07/02/22 9784    oxybutynin (DITROPAN) tablet 5 mg  5 mg Oral BID Doc Torres, APRN - CNP   5 mg at 07/02/22 3228    oxyCODONE (ROXICODONE) immediate release tablet 5 mg  5 mg Oral 4x Daily PRN Doc Torres, APRN - CNP   5 mg at 07/01/22 1551    ranolazine (RANEXA) extended release tablet 500 mg  500 mg Oral BID Doc Torres APRN - CNP   500 mg at 07/02/22 6525    tiotropium (SPIRIVA RESPIMAT) 2.5 MCG/ACT inhaler 2 puff  2 puff Inhalation Daily BENNETT Gregorio CNP   2 puff at 07/02/22 0710    traZODone (DESYREL) tablet 50 mg  50 mg Oral Nightly PRN BENNETT Gregorio CNP venlafaxine (EFFEXOR XR) extended release capsule 150 mg  150 mg Oral Daily Irl AUGUSTINA GarayN - CNP   150 mg at 07/02/22 0945    venlafaxine (EFFEXOR XR) extended release capsule 75 mg  75 mg Oral Daily Irl Jarrod APRN - CNP   75 mg at 07/02/22 1000    potassium chloride (KLOR-CON M) extended release tablet 40 mEq  40 mEq Oral PRN Kinjal Mccurdy MD   40 mEq at 06/28/22 1436    Or    potassium bicarb-citric acid (EFFER-K) effervescent tablet 40 mEq  40 mEq Oral PRN Kinjal Mccurdy MD        Or   Joellen Lobe potassium chloride 10 mEq/100 mL IVPB (Peripheral Line)  10 mEq IntraVENous PRN Kinjal Mccurdy MD        perflutren lipid microspheres (DEFINITY) injection 1.65 mg  1.5 mL IntraVENous ONCE PRN Ro Flores MD        sodium chloride flush 0.9 % injection 10 mL  10 mL IntraVENous PRN Elif Souza, DO   10 mL at 06/27/22 2118       Allergies:  Chantix [varenicline tartrate] and Sulphadimidine [sulfamethazine]    Social History:   reports that she quit smoking about 22 years ago. Her smoking use included cigarettes. She has a 40.00 pack-year smoking history. She has never used smokeless tobacco. She reports current alcohol use of about 1.0 standard drink of alcohol per week. She reports current drug use. Drug: Marijuana Charmayne Stagillian). Family History: family history includes Other in her father; Stroke in her mother. REVIEW OF SYSTEMS:    Constitutional: No fever or chills.  No night sweats, no weight loss   Eyes: No eye discharge, double vision, or eye pain   HEENT: negative for sore mouth, sore throat, hoarseness and voice change   Respiratory: negative for cough , sputum, dyspnea, wheezing, hemoptysis, chest pain   Cardiovascular: negative for chest pain, dyspnea, palpitations, orthopnea, PND   Gastrointestinal: negative for nausea, vomiting, diarrhea, constipation, abdominal pain, Dysphagia, hematemesis and hematochezia   Genitourinary: negative for frequency, dysuria, nocturia, urinary incontinence, and hematuria Integument: negative for rash, skin lesions, bruises.    Hematologic/Lymphatic: negative for easy bruising, bleeding, lymphadenopathy, or petechiae   Endocrine: negative for heat or cold intolerance,weight changes, change in bowel habits and hair loss   Musculoskeletal: negative for myalgias, arthralgias, pain, joint swelling,and bone pain   Neurological: negative for headaches, dizziness, seizures, weakness, numbness    PHYSICAL EXAM:      /79   Pulse 78   Temp 97.6 °F (36.4 °C) (Oral)   Resp 18   Ht 5' (1.524 m)   Wt 221 lb 5.5 oz (100.4 kg)   SpO2 95%   BMI 43.23 kg/m²    Temp (24hrs), Av.4 °F (33.6 °C), Min:69 °F (20.6 °C), Max:98.6 °F (37 °C)    General appearance - well appearing, no in pain or distress   Mental status - alert and cooperative   Eyes - pupils equal and reactive, extraocular eye movements intact   Ears - bilateral TM's and external ear canals normal   Mouth - mucous membranes moist, pharynx normal without lesions   Neck - supple, no significant adenopathy   Lymphatics - no palpable lymphadenopathy, no hepatosplenomegaly   Chest - clear to auscultation, no wheezes, rales or rhonchi, symmetric air entry   Heart - normal rate, regular rhythm, normal S1, S2, no murmurs  Abdomen - soft, nontender, nondistended, no masses or organomegaly   Neurological - alert, oriented, normal speech, no focal findings or movement disorder noted   Musculoskeletal - no joint tenderness, deformity or swelling   Extremities - peripheral pulses normal, no pedal edema, no clubbing or cyanosis   Skin - normal coloration and turgor, no rashes, no suspicious skin lesions noted ,    DATA:    Labs:   CBC:   Recent Labs     22  0541   WBC 17.1*   HGB 11.2*   HCT 34.8*   *     BMP:   Recent Labs     22  0541 22  0532    135   K 4.1 4.9   CO2 26 28   BUN 5* 6*   CREATININE 0.66 0.65   LABGLOM >60 >60   GLUCOSE 130* 120*     PT/INR:   No results for input(s): PROTIME, INR in the last 72 hours. IMAGING DATA:  IR BIOPSY LIVER PERCUTANEOUS   Final Result   Successful liver biopsy under ultrasound guidance, as above. VL DUP LOWER EXTREMITY VENOUS BILATERAL   Final Result      CT ABDOMEN PELVIS W IV CONTRAST Additional Contrast? None   Final Result      CT CHEST PULMONARY EMBOLISM W CONTRAST   Final Result      XR CHEST PORTABLE   Final Result   Mild bibasal fibrosis and small right pleural effusion. Primary Problem  Pulmonary embolus [facility])    Active Hospital Problems    Diagnosis Date Noted    Pulmonary embolus ([facility] [I26.99] 06/27/2022     Priority: Medium    Liver mass [R16.0]      Priority: Medium    Diabetes mellitus type 2 in obese ([facility] [E11.69, E66.9] 02/18/2019    Morbid obesity ([facility] [E66.01] 02/18/2019         IMPRESSION:   1. Metastatic disease with multiple liver lesion possibly pancreatobiliary origin,  2. Acute PE  3. Peritoneal carcinomatosis    RECOMMENDATIONS:  1. I reviewed the laboratory data, imaging studies, prior records, discussed diagnosis, prognosis and treatment recommendations  2. Continue heparin drip  3. Liver biopsy done 6/29. Pathology pending. 4. Continue other management as per primary team  5. Patient can be transition to NOAC at discharge with Eliquis  6. We will follow                            [facility]                            This note is created with the assistance of a speech recognition program.  While intending to generate a document that actually reflects the content of the visit, the document can still have some errors including those of syntax and sound a like substitutions which may escape proof reading. It such instances, actual meaning can be extrapolated by contextual diversion.

## 2022-07-02 NOTE — PROGRESS NOTES
Physical Therapy  Facility/Department: [facility] PROGRESSIVE CARE  Daily Treatment Note  NAME: [address]  : 1950  MRN: [id number]    Date of Service: 2022    Discharge Recommendations:  (P) Patient would benefit from continued therapy after discharge        Patient Diagnosis(es): The primary encounter diagnosis was Other acute pulmonary embolism without acute cor pulmonale ([address] A diagnosis of Liver mass was also pertinent to this visit. Assessment   Activity Tolerance: (P) Patient tolerated treatment well;Patient limited by pain     Plan    Plan  Plan: (P) 5-7 times per week  Current Treatment Recommendations: (P) Strengthening;Balance training;Functional mobility training;Transfer training; Endurance training;Gait training; Safety education & training;Patient/Caregiver education & training; Therapeutic activities;Stair training     Restrictions  Restrictions/Precautions  Restrictions/Precautions: General Precautions,Fall Risk,Bed Alarm  Required Braces or Orthoses?: No  Implants present? : Metal implants     Subjective    Subjective  Subjective: (P) Patient in bed upon arrival and states she is willing to work with PT but she has an intermittent pain in her R groin that may limit her.    Pain: (P) 10/10 R groin - intermittent  Orientation  Overall Orientation Status: (P) Within Functional Limits  Cognition  Overall Cognitive Status: (P) WFL     Objective   Vitals     Bed Mobility Training  Bed Mobility Training: (P) Yes  Overall Level of Assistance: (P) Modified independent  Interventions: (P) Verbal cues  Rolling: (P) Modified independent  Supine to Sit: (P) Modified independent  Scooting: (P) Modified independent  Balance  Sitting: (P) Intact  Standing: (P) Without support (CGA for safety)  Transfer Training  Transfer Training: (P) Yes  Overall Level of Assistance: (P) Stand-by assistance  Sit to Stand: (P) Stand-by assistance  Stand to Sit: (P) Stand-by assistance  Bed to Chair: (P) Contact-guard assistance  Gait Training  Gait Training: (P) Yes  Gait  Overall Level of Assistance: (P) Contact-guard assistance  Speed/Ritika: (P) Slow  Step Length: (P) Left shortened;Right shortened  Stance: (P) Right decreased  Gait Abnormalities: (P) Antalgic;Decreased step clearance (decreased heel strike)  Distance (ft): (P) 60 Feet     PT Exercises  Exercise Treatment: (P) No exercises completed this date due to pain onset upon sitting in chair. Goals  Short Term Goals  Time Frame for Short term goals: 1 week  Short term goal 1: pt will perform bed mobility independently to improve function  Short term goal 2: pt will perform sit<>stand transfer with SBA and no device  Short term goal 3: pt will ambulate 150' with SBA to increase endurance  Short term goal 4: pt will perform 10 step ups onto a 6\" platform with handheld assist to safely return home    Education  Patient Education  Education Given To: (P) Patient  Education Provided: (P) Role of Therapy;Plan of Care;Home Exercise Program  Education Provided Comments: (P) Written HEP given for pt to complete exercises when pain is tolerable. Education Method: (P) Printed Information/Hand-outs; Verbal  Barriers to Learning: (P) None  Education Outcome: (P) Verbalized understanding    Therapy Time   Individual Concurrent Group Co-treatment   Time In (P) 1054         Time Out (P) 1112         Minutes (P) 18         Timed Code Treatment Minutes: (P) 18 Minutes       Sherri Push, PTA

## 2022-07-02 NOTE — CARE COORDINATION
ONGOING DISCHARGE PLAN:    Reviewed chart and plan continues to be for patient to go to [facility]. LM with Milagros Navas from admission. LSW notes indicate Auth was started 06/30. Cardio signed off    ORANGE HEADER 19%  Will continue to follow for additional discharge needs.     Electronically signed by Andrew Swanson RN on 7/2/2022 at 12:57 PM

## 2022-07-03 LAB
ANION GAP SERPL CALCULATED.3IONS-SCNC: 11 MMOL/L (ref 9–17)
BUN BLDV-MCNC: 9 MG/DL (ref 8–23)
CALCIUM SERPL-MCNC: 8.4 MG/DL (ref 8.6–10.4)
CHLORIDE BLD-SCNC: 95 MMOL/L (ref 98–107)
CO2: 26 MMOL/L (ref 20–31)
CREAT SERPL-MCNC: 0.71 MG/DL (ref 0.5–0.9)
GFR AFRICAN AMERICAN: >60 ML/MIN
GFR NON-AFRICAN AMERICAN: >60 ML/MIN
GFR SERPL CREATININE-BSD FRML MDRD: ABNORMAL ML/MIN/{1.73_M2}
GLUCOSE BLD-MCNC: 113 MG/DL (ref 70–99)
GLUCOSE BLD-MCNC: 150 MG/DL (ref 65–105)
HCT VFR BLD CALC: 31.7 % (ref 36–46)
HEMOGLOBIN: 10.2 G/DL (ref 12–16)
MCH RBC QN AUTO: 28.6 PG (ref 26–34)
MCHC RBC AUTO-ENTMCNC: 32.2 G/DL (ref 31–37)
MCV RBC AUTO: 88.7 FL (ref 80–100)
PDW BLD-RTO: 14.3 % (ref 11.5–14.9)
PLATELET # BLD: 517 K/UL (ref 150–450)
PMV BLD AUTO: 7.4 FL (ref 6–12)
POTASSIUM SERPL-SCNC: 3.9 MMOL/L (ref 3.7–5.3)
RBC # BLD: 3.57 M/UL (ref 4–5.2)
SODIUM BLD-SCNC: 132 MMOL/L (ref 135–144)
WBC # BLD: 13.1 K/UL (ref 3.5–11)

## 2022-07-03 PROCEDURE — 2580000003 HC RX 258: Performed by: INTERNAL MEDICINE

## 2022-07-03 PROCEDURE — 6370000000 HC RX 637 (ALT 250 FOR IP): Performed by: NURSE PRACTITIONER

## 2022-07-03 PROCEDURE — 6370000000 HC RX 637 (ALT 250 FOR IP): Performed by: INTERNAL MEDICINE

## 2022-07-03 PROCEDURE — 36415 COLL VENOUS BLD VENIPUNCTURE: CPT

## 2022-07-03 PROCEDURE — 99232 SBSQ HOSP IP/OBS MODERATE 35: CPT | Performed by: INTERNAL MEDICINE

## 2022-07-03 PROCEDURE — 94761 N-INVAS EAR/PLS OXIMETRY MLT: CPT

## 2022-07-03 PROCEDURE — 94640 AIRWAY INHALATION TREATMENT: CPT

## 2022-07-03 PROCEDURE — 2580000003 HC RX 258: Performed by: NURSE PRACTITIONER

## 2022-07-03 PROCEDURE — 85027 COMPLETE CBC AUTOMATED: CPT

## 2022-07-03 PROCEDURE — 82947 ASSAY GLUCOSE BLOOD QUANT: CPT

## 2022-07-03 PROCEDURE — 80048 BASIC METABOLIC PNL TOTAL CA: CPT

## 2022-07-03 PROCEDURE — 2060000000 HC ICU INTERMEDIATE R&B

## 2022-07-03 RX ORDER — GABAPENTIN 600 MG/1
300 TABLET ORAL 3 TIMES DAILY
Status: DISCONTINUED | OUTPATIENT
Start: 2022-07-03 | End: 2022-07-05 | Stop reason: HOSPADM

## 2022-07-03 RX ORDER — DOCUSATE SODIUM 100 MG/1
100 CAPSULE, LIQUID FILLED ORAL 2 TIMES DAILY PRN
Status: DISCONTINUED | OUTPATIENT
Start: 2022-07-03 | End: 2022-07-05 | Stop reason: HOSPADM

## 2022-07-03 RX ORDER — 0.9 % SODIUM CHLORIDE 0.9 %
500 INTRAVENOUS SOLUTION INTRAVENOUS ONCE
Status: COMPLETED | OUTPATIENT
Start: 2022-07-03 | End: 2022-07-03

## 2022-07-03 RX ADMIN — OXYCODONE HYDROCHLORIDE 5 MG: 5 TABLET ORAL at 15:34

## 2022-07-03 RX ADMIN — OXYCODONE HYDROCHLORIDE 5 MG: 5 TABLET ORAL at 22:22

## 2022-07-03 RX ADMIN — CLOPIDOGREL BISULFATE 75 MG: 75 TABLET ORAL at 09:34

## 2022-07-03 RX ADMIN — GABAPENTIN 300 MG: 600 TABLET, FILM COATED ORAL at 20:24

## 2022-07-03 RX ADMIN — DOCUSATE SODIUM 100 MG: 100 CAPSULE, LIQUID FILLED ORAL at 09:35

## 2022-07-03 RX ADMIN — ATORVASTATIN CALCIUM 80 MG: 80 TABLET, FILM COATED ORAL at 09:33

## 2022-07-03 RX ADMIN — RANOLAZINE 500 MG: 500 TABLET, EXTENDED RELEASE ORAL at 09:34

## 2022-07-03 RX ADMIN — OXYCODONE HYDROCHLORIDE 10 MG: 10 TABLET, FILM COATED, EXTENDED RELEASE ORAL at 20:24

## 2022-07-03 RX ADMIN — ARIPIPRAZOLE 5 MG: 5 TABLET ORAL at 09:33

## 2022-07-03 RX ADMIN — BUDESONIDE AND FORMOTEROL FUMARATE DIHYDRATE 2 PUFF: 160; 4.5 AEROSOL RESPIRATORY (INHALATION) at 19:49

## 2022-07-03 RX ADMIN — MULTIPLE VITAMINS W/ MINERALS TAB 1 TABLET: TAB at 09:35

## 2022-07-03 RX ADMIN — APIXABAN 10 MG: 5 TABLET, FILM COATED ORAL at 09:33

## 2022-07-03 RX ADMIN — OXYBUTYNIN CHLORIDE 5 MG: 5 TABLET ORAL at 09:35

## 2022-07-03 RX ADMIN — PANTOPRAZOLE SODIUM 40 MG: 40 TABLET, DELAYED RELEASE ORAL at 06:15

## 2022-07-03 RX ADMIN — GABAPENTIN 300 MG: 600 TABLET, FILM COATED ORAL at 15:32

## 2022-07-03 RX ADMIN — BUDESONIDE AND FORMOTEROL FUMARATE DIHYDRATE 2 PUFF: 160; 4.5 AEROSOL RESPIRATORY (INHALATION) at 07:48

## 2022-07-03 RX ADMIN — TIOTROPIUM BROMIDE INHALATION SPRAY 2 PUFF: 3.12 SPRAY, METERED RESPIRATORY (INHALATION) at 07:48

## 2022-07-03 RX ADMIN — SODIUM CHLORIDE, PRESERVATIVE FREE 10 ML: 5 INJECTION INTRAVENOUS at 20:30

## 2022-07-03 RX ADMIN — VENLAFAXINE HYDROCHLORIDE 150 MG: 150 CAPSULE, EXTENDED RELEASE ORAL at 09:34

## 2022-07-03 RX ADMIN — APIXABAN 10 MG: 5 TABLET, FILM COATED ORAL at 20:24

## 2022-07-03 RX ADMIN — SODIUM CHLORIDE 500 ML: 9 INJECTION, SOLUTION INTRAVENOUS at 13:26

## 2022-07-03 RX ADMIN — OXYCODONE HYDROCHLORIDE 10 MG: 10 TABLET, FILM COATED, EXTENDED RELEASE ORAL at 09:34

## 2022-07-03 RX ADMIN — VENLAFAXINE HYDROCHLORIDE 75 MG: 75 CAPSULE, EXTENDED RELEASE ORAL at 09:36

## 2022-07-03 RX ADMIN — OXYBUTYNIN CHLORIDE 5 MG: 5 TABLET ORAL at 20:24

## 2022-07-03 RX ADMIN — RANOLAZINE 500 MG: 500 TABLET, EXTENDED RELEASE ORAL at 20:23

## 2022-07-03 RX ADMIN — SODIUM CHLORIDE, PRESERVATIVE FREE 10 ML: 5 INJECTION INTRAVENOUS at 09:35

## 2022-07-03 RX ADMIN — GABAPENTIN 300 MG: 600 TABLET, FILM COATED ORAL at 09:33

## 2022-07-03 ASSESSMENT — PAIN SCALES - GENERAL
PAINLEVEL_OUTOF10: 8
PAINLEVEL_OUTOF10: 7
PAINLEVEL_OUTOF10: 8
PAINLEVEL_OUTOF10: 8
PAINLEVEL_OUTOF10: 7

## 2022-07-03 ASSESSMENT — PAIN DESCRIPTION - DESCRIPTORS
DESCRIPTORS: OTHER (COMMENT)
DESCRIPTORS: DISCOMFORT;SHOOTING
DESCRIPTORS: ACHING
DESCRIPTORS: PRESSURE

## 2022-07-03 ASSESSMENT — PAIN DESCRIPTION - LOCATION
LOCATION: PELVIS;CHEST
LOCATION: ABDOMEN
LOCATION: OTHER (COMMENT)
LOCATION: ABDOMEN
LOCATION: ABDOMEN

## 2022-07-03 ASSESSMENT — PAIN DESCRIPTION - ORIENTATION: ORIENTATION: LOWER

## 2022-07-03 NOTE — PROGRESS NOTES
Today's Date: 7/3/2022  Patient Name: Demond Mixon  Date of admission: 6/27/2022  6:49 PM  Patient's age: 81 y.o., 1950  Admission Dx: Pulmonary embolism on right [facility]) [I26.99]    Reason for Consult: PE  Requesting Physician: Verna Austin MD    CHIEF COMPLAINT:    Chief Complaint   Patient presents with    Fatigue    Abdominal Pain     SUBJECTIVE:  .patient was seen and examined. No events overnight. Pain is controlled on current treatment. No fever. No hemoptysis. No other complaints. BRIEF CASE HISTORY:    Demond Mixon is a 81 y.o. female who is admitted to the hospital on 6/27/2022  for fatigue and abd pain. Patient was recently admitted to hospital about a week ago and at that time she had a CT scan of the abdomen which showed multiple liver lesions suspicious for metastasis. She was evaluated by oncology. Her tumor markers showed very high CA 19-9 and CEA and AFP. 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 IR guided biopsy was ordered but because patient was on Plavix plan was to do biopsy as outpatient. CT chest abdomen pelvis and CT chest showed small pulmonary emboli in the right upper and right middle lobes with very small clot burden. Oncology consulted for further evaluation. Past Medical History:   has a past medical history of Acid reflux, Arthritis, Cataracts, bilateral, CHF (congestive heart failure) ([address] COPD (chronic obstructive pulmonary disease) ([address] Gall stones, Head injury, Hyperlipidemia, Hypertension, Insomnia, MI (myocardial infarction) ([address] Neuropathy, Sleep apnea, and Type 2 diabetes mellitus without complication ([address] Past Surgical History:   has a past surgical history that includes Femur Surgery; Foot surgery (Left); Mandible fracture surgery; Cardiac surgery (1999); Ankle surgery (Left); Tonsillectomy; Coronary angioplasty with stent (1999); and IR BIOPSY LIVER PERCUTANEOUS (6/29/2022).      Medications:    Prior to (PRINIVIL;ZESTRIL) 10 MG tablet take 1 tablet by mouth once daily 3/12/21   BENNETT Fontaine CNP   venlafaxine (EFFEXOR XR) 150 MG extended release capsule Take 1 capsule by mouth daily Take with 75 mg capsule 2/26/21   BENNETT Fontaine CNP   ARIPiprazole (ABILIFY) 5 MG tablet Take 1 tablet by mouth daily 2/26/21   BENNETT Fontaine CNP   budesonide-formoterol [facility]) 160-4.5 MCG/ACT AERO Inhale 2 puffs into the lungs 2 times daily 2/26/21   BENNETT Fontaine CNP   tiotropium (SPIRIVA RESPIMAT) 2.5 MCG/ACT AERS inhaler Inhale 2 puffs into the lungs daily 2/26/21   BENNETT Fontaine CNP   traZODone (DESYREL) 50 MG tablet take 1 tablet by mouth at bedtime if needed for sleep 12/30/20   Historical Provider, MD   oxybutynin (DITROPAN) 5 MG tablet take 1 tablet by mouth twice a day 2/26/21   BENNETT Fontaine CNP   glucose monitoring kit (FREESTYLE) monitoring kit 1 kit by Does not apply route daily 10/3/20   BENNETT Fontaine CNP   blood glucose monitor strips Test 3 times a day & as needed for symptoms of irregular blood glucose. Dispense sufficient amount for indicated testing frequency plus additional to accommodate PRN testing needs.  10/3/20   BENNETT Fontaine CNP   Lancets MISC 1 each by Does not apply route 3 times daily 10/3/20   BENNETT Fontaine CNP   atorvastatin (LIPITOR) 80 MG tablet Take 1 tablet by mouth daily 9/30/20   BENNETT Fontaine CNP   bisoprolol (ZEBETA) 5 MG tablet take 1 tablet by mouth once daily 9/30/20   BENNETT Fontaine CNP   omeprazole (PRILOSEC) 40 MG delayed release capsule take 1 capsule by mouth every morning BEFORE BREAKFAST 9/30/20   BENNETT Fontaine CNP   venlafaxine (EFFEXOR XR) 75 MG extended release capsule Take 1 capsule by mouth daily 9/30/20   BENNETT Fontaine CNP   glimepiride (AMARYL) 2 MG tablet take 1 tablet by mouth twice a day before meals 9/30/20   BENNETT Fontaine CNP   Semaglutide, 1 MG/DOSE, (OZEMPIC, 1 MG/DOSE,) 2 MG/1.5ML SOPN Inject 1 mg per week 9/30/20   BENNETT Castrejon CNP   Multiple Vitamins-Minerals (THERAPEUTIC MULTIVITAMIN-MINERALS) tablet Take 1 tablet by mouth daily 9/30/20   BENNETT Castrejon - CNP   Melatonin 5 MG SUBL Place 1 applicator under the tongue nightly as needed (insomnia) 9/30/20   BENNETT Castrejon CNP   Insulin Pen Needle 32G X 4 MM MISC 1 each by Does not apply route once a week 3/12/20   Thomas Garner MD   fluticasone [facility]) 50 MCG/ACT nasal spray 2 sprays by Nasal route daily 9/13/19   Thomas Garner MD     Current Facility-Administered Medications   Medication Dose Route Frequency Provider Last Rate Last Admin    gabapentin (NEURONTIN) tablet 300 mg  300 mg Oral TID Quincy Rodrigues MD   300 mg at 07/03/22 0933    docusate sodium (COLACE) capsule 100 mg  100 mg Oral BID PRN Quincy Rodrigues MD   100 mg at 07/03/22 0935    0.9 % sodium chloride bolus  500 mL IntraVENous Once Quincy Rodrigues MD        [Held by provider] lisinopril (PRINIVIL;ZESTRIL) tablet 5 mg  5 mg Oral Daily Chava Luciano MD        oxyCODONE (OXYCONTIN) extended release tablet 10 mg  10 mg Oral 2 times per day Sushil Harp MD   10 mg at 07/03/22 0934    ALPRAZolam (XANAX) tablet 0.5 mg  0.5 mg Oral TID PRN Sushil Harp MD   0.5 mg at 07/02/22 2113    apixaban (ELIQUIS) tablet 10 mg  10 mg Oral BID Quincy Rodrigues MD   10 mg at 07/03/22 0933    Followed by   Lia Downs ON 7/7/2022] apixaban (ELIQUIS) tablet 5 mg  5 mg Oral BID Quincy Rodrigues MD        clopidogrel (PLAVIX) tablet 75 mg  75 mg Oral Daily Quincy Rodrigues MD   75 mg at 07/03/22 0934    melatonin tablet 6 mg  6 mg Oral Nightly PRN Ginger Fillers, APRN - CNP   6 mg at 06/30/22 2316    HYDROmorphone (DILAUDID) injection 0.5 mg  0.5 mg IntraVENous Q4H PRN Ginger Fillers, APRN - CNP   0.5 mg at 07/02/22 2248    sodium chloride flush 0.9 % injection 5-40 mL  5-40 mL IntraVENous 2 times per day Ginger Fillers, APRN - CNP   10 mL at 07/03/22 0935    sodium chloride flush 0.9 % injection 10 mL  10 mL IntraVENous PRN Drucilla Ball, APRN - CNP   10 mL at 07/02/22 2248    0.9 % sodium chloride infusion   IntraVENous PRN Drucilla Ball, APRN - CNP        magnesium hydroxide (MILK OF MAGNESIA) 400 MG/5ML suspension 30 mL  30 mL Oral Daily PRN Drucilla Ball, APRN - CNP        acetaminophen (TYLENOL) tablet 650 mg  650 mg Oral Q6H PRN Drucilla Ball, APRN - CNP        Or    acetaminophen (TYLENOL) suppository 650 mg  650 mg Rectal Q6H PRN Drucilla Ball, APRN - CNP        albuterol sulfate HFA (PROVENTIL;VENTOLIN;PROAIR) 108 (90 Base) MCG/ACT inhaler 2 puff  2 puff Inhalation Q6H PRN Drucilla Ball, APRN - CNP   2 puff at 07/01/22 1935    ARIPiprazole (ABILIFY) tablet 5 mg  5 mg Oral Daily Drucilla Ball, APRN - CNP   5 mg at 07/03/22 0933    atorvastatin (LIPITOR) tablet 80 mg  80 mg Oral Daily Drucilla Ball, APRN - CNP   80 mg at 07/03/22 0933    [Held by provider] metoprolol succinate (TOPROL XL) extended release tablet 50 mg  50 mg Oral Daily Drucilla Ball, APRN - CNP   50 mg at 07/02/22 3343    budesonide-formoterol (SYMBICORT) 160-4.5 MCG/ACT inhaler 2 puff  2 puff Inhalation BID Drucilla Ball, APRN - CNP   2 puff at 07/03/22 0748    fluticasone (FLONASE) 50 MCG/ACT nasal spray 2 spray  2 spray Nasal Daily Drucilla Ball, APRN - CNP   2 spray at 07/01/22 1051    therapeutic multivitamin-minerals 1 tablet  1 tablet Oral Daily Drucilla Ball, APRN - CNP   1 tablet at 07/03/22 0935    pantoprazole (PROTONIX) tablet 40 mg  40 mg Oral QAM AC Drucilla Ball, APRN - CNP   40 mg at 07/03/22 0615    oxybutynin (DITROPAN) tablet 5 mg  5 mg Oral BID Drucilla Ball, APRN - CNP   5 mg at 07/03/22 0935    oxyCODONE (ROXICODONE) immediate release tablet 5 mg  5 mg Oral 4x Daily PRN BENNETT Valencia CNP   5 mg at 07/01/22 1551    ranolazine (RANEXA) extended release tablet 500 mg  500 mg Oral BID BENNETT Valencia CNP   500 mg at 07/03/22 7774    tiotropium (SPIRIVA RESPIMAT) 2.5 MCG/ACT inhaler 2 puff  2 puff Inhalation Daily Marely Fillers, APRN - CNP   2 puff at 07/03/22 0748    traZODone (DESYREL) tablet 50 mg  50 mg Oral Nightly PRN Marely Fillers, APRN - CNP        venlafaxine (EFFEXOR XR) extended release capsule 150 mg  150 mg Oral Daily Marely Fillers, APRN - CNP   150 mg at 07/03/22 0934    venlafaxine (EFFEXOR XR) extended release capsule 75 mg  75 mg Oral Daily Marely Fillers, APRN - CNP   75 mg at 07/03/22 8289    potassium chloride (KLOR-CON M) extended release tablet 40 mEq  40 mEq Oral PRN Quincy Rodrigues MD   40 mEq at 06/28/22 1436    Or    potassium bicarb-citric acid (EFFER-K) effervescent tablet 40 mEq  40 mEq Oral PRN Quincy Rodrigues MD        Or   Olvinjessica Davey potassium chloride 10 mEq/100 mL IVPB (Peripheral Line)  10 mEq IntraVENous PRN Quincy Rodrigues MD        perflutren lipid microspheres (DEFINITY) injection 1.65 mg  1.5 mL IntraVENous ONCE PRN Sherie Dunaway MD        sodium chloride flush 0.9 % injection 10 mL  10 mL IntraVENous PRN Elif Souza DO   10 mL at 06/27/22 2118       Allergies:  Chantix [varenicline tartrate] and Sulphadimidine [sulfamethazine]    Social History:   reports that she quit smoking about 22 years ago. Her smoking use included cigarettes. She has a 40.00 pack-year smoking history. She has never used smokeless tobacco. She reports current alcohol use of about 1.0 standard drink of alcohol per week. She reports current drug use. Drug: Marijuana Berneta Juan). Family History: family history includes Other in her father; Stroke in her mother. REVIEW OF SYSTEMS:    Constitutional: No fever or chills.  No night sweats, no weight loss   Eyes: No eye discharge, double vision, or eye pain   HEENT: negative for sore mouth, sore throat, hoarseness and voice change   Respiratory: negative for cough , sputum, dyspnea, wheezing, hemoptysis, chest pain   Cardiovascular: negative for chest pain, dyspnea, palpitations, orthopnea, PND   Gastrointestinal: negative for nausea, vomiting, diarrhea, constipation, abdominal pain, Dysphagia, hematemesis and hematochezia   Genitourinary: negative for frequency, dysuria, nocturia, urinary incontinence, and hematuria   Integument: negative for rash, skin lesions, bruises.    Hematologic/Lymphatic: negative for easy bruising, bleeding, lymphadenopathy, or petechiae   Endocrine: negative for heat or cold intolerance,weight changes, change in bowel habits and hair loss   Musculoskeletal: negative for myalgias, arthralgias, pain, joint swelling,and bone pain   Neurological: negative for headaches, dizziness, seizures, weakness, numbness    PHYSICAL EXAM:      BP (!) 87/40   Pulse 76   Temp 97.8 °F (36.6 °C) (Oral)   Resp 18   Ht 5' (1.524 m)   Wt 230 lb 2.6 oz (104.4 kg)   SpO2 94%   BMI 44.95 kg/m²    Temp (24hrs), Av.4 °F (36.9 °C), Min:97.6 °F (36.4 °C), Max:99.1 °F (37.3 °C)    General appearance - well appearing, no in pain or distress   Mental status - alert and cooperative   Eyes - pupils equal and reactive, extraocular eye movements intact   Ears - bilateral TM's and external ear canals normal   Mouth - mucous membranes moist, pharynx normal without lesions   Neck - supple, no significant adenopathy   Lymphatics - no palpable lymphadenopathy, no hepatosplenomegaly   Chest - clear to auscultation, no wheezes, rales or rhonchi, symmetric air entry   Heart - normal rate, regular rhythm, normal S1, S2, no murmurs  Abdomen - soft, nontender, nondistended, no masses or organomegaly   Neurological - alert, oriented, normal speech, no focal findings or movement disorder noted   Musculoskeletal - no joint tenderness, deformity or swelling   Extremities - peripheral pulses normal, no pedal edema, no clubbing or cyanosis   Skin - normal coloration and turgor, no rashes, no suspicious skin lesions noted ,    DATA:    Labs:   CBC:   Recent Labs     22  0541 22  0603   WBC 17.1* 13.1*   HGB 11.2* 10.2*   HCT 34.8* 31.7*   * 517*     BMP:   Recent Labs     07/02/22  0532 07/03/22  0603    132*   K 4.9 3.9   CO2 28 26   BUN 6* 9   CREATININE 0.65 0.71   LABGLOM >60 >60   GLUCOSE 120* 113*     PT/INR:   No results for input(s): PROTIME, INR in the last 72 hours. IMAGING DATA:  IR BIOPSY LIVER PERCUTANEOUS   Final Result   Successful liver biopsy under ultrasound guidance, as above. VL DUP LOWER EXTREMITY VENOUS BILATERAL   Final Result      CT ABDOMEN PELVIS W IV CONTRAST Additional Contrast? None   Final Result      CT CHEST PULMONARY EMBOLISM W CONTRAST   Final Result      XR CHEST PORTABLE   Final Result   Mild bibasal fibrosis and small right pleural effusion. Primary Problem  Pulmonary embolus [facility])    Active Hospital Problems    Diagnosis Date Noted    Pulmonary embolus ([address] [I26.99] 06/27/2022     Priority: Medium    Liver mass [R16.0]      Priority: Medium    Diabetes mellitus type 2 in obese ([facility] [E11.69, E66.9] 02/18/2019    Morbid obesity ([facility] [E66.01] 02/18/2019         IMPRESSION:   1. Metastatic disease with multiple liver lesion possibly pancreatobiliary origin,  2. Acute PE  3. Peritoneal carcinomatosis    RECOMMENDATIONS:  1. I reviewed the laboratory data, imaging studies, prior records, discussed diagnosis, prognosis and treatment recommendations  2. Continue heparin drip  3. Liver biopsy done 6/29. Pathology pending. 4. Continue other management as per primary team  5. Patient can be transition to NOAC at discharge with Eliquis  6.  We will follow                            [address] [place] [address] [facility]                            This note is created with the assistance of a speech recognition program.  While intending to generate a document that actually reflects the content of the visit, the document can still have some errors including those of syntax and sound a like substitutions which may escape proof reading. It such instances, actual meaning can be extrapolated by contextual diversion.

## 2022-07-03 NOTE — PROGRESS NOTES
[place] [id number] [facility]    Progress Note     7/3/2022    8:46 AM    Name:   Elbert Corona  MRN:     [id number]     Acct:      [de-identified]   Room:   2095/2095-01   Day:  6  Admit Date:  6/27/2022  6:49 PM    PCP:   Rasheeda Mcdonnell MD  Code Status:  Full Code    Subjective:     C/C:   Chief Complaint   Patient presents with    Fatigue    Abdominal Pain     Principal Problem:    Pulmonary embolus ([address]  Active Problems:    Liver mass    Diabetes mellitus type 2 in obese ([facility] [address]    Morbid obesity ([address]  Resolved Problems:    * No resolved hospital problems.  *      Patient unfortunately has multiple medical problem, and she is not a very good historian  She has history of coronary artery disease s/p CABG back in 1999, COPD, diabetes, hypertension, patient was recently discharged from the hospital, when she was admitted abdominal pain, hypokalemia  CT abdomen pelvis was done concerning for possible pancreatobiliary malignancy with liver mets   Patient, underwent CT chest in the emergency room, suggestive of PE  On heparin drip  Patient plan for liver biopsy today  Interval history  Patient is feeling better, still having abdominal pain  Had 1 episode of vomiting yesterday  Evaluated by oncologist    7/3  Patient told me that she has not passed stools  Episode of hypotension, asymptomatic        Recent Results (from the past 24 hour(s))   POC Glucose Fingerstick    Collection Time: 07/02/22 11:32 AM   Result Value Ref Range    POC Glucose 131 (H) 65 - 105 mg/dL   POC Glucose Fingerstick    Collection Time: 07/02/22  4:30 PM   Result Value Ref Range    POC Glucose 130 (H) 65 - 105 mg/dL   CBC    Collection Time: 07/03/22  6:03 AM   Result Value Ref Range    WBC 13.1 (H) 3.5 - 11.0 k/uL    RBC 3.57 (L) 4.0 - 5.2 m/uL    Hemoglobin 10.2 (L) 12.0 - 16.0 g/dL    Hematocrit 31.7 (L) 36 - 46 %    MCV 88.7 80 - 100 fL    MCH 28.6 26 - 34 pg    MCHC 32.2 31 - 37 g/dL    RDW 14.3 11.5 - 14.9 %    Platelets 794 (H) 280 - 450 k/uL    MPV 7.4 6.0 - 12.0 fL   Basic Metabolic Panel w/ Reflex to MG    Collection Time: 07/03/22  6:03 AM   Result Value Ref Range    Glucose 113 (H) 70 - 99 mg/dL    BUN 9 8 - 23 mg/dL    CREATININE 0.71 0.50 - 0.90 mg/dL    Calcium 8.4 (L) 8.6 - 10.4 mg/dL    Sodium 132 (L) 135 - 144 mmol/L    Potassium 3.9 3.7 - 5.3 mmol/L    Chloride 95 (L) 98 - 107 mmol/L    CO2 26 20 - 31 mmol/L    Anion Gap 11 9 - 17 mmol/L    GFR Non-African American >60 >60 mL/min    GFR African American >60 >60 mL/min    GFR Comment           Recent Labs     07/01/22  1616 07/01/22  1956 07/02/22  0705 07/02/22  1132 07/02/22  1630   POCGLU 146* 148* 139* 131* 130*        CT ABDOMEN PELVIS W IV CONTRAST Additional Contrast? None    Result Date: 6/27/2022  EXAMINATION: CTA OF THE CHEST; CT OF THE ABDOMEN AND PELVIS WITH CONTRAST 6/27/2022 6:09 pm; 6/27/2022 6:20 pm TECHNIQUE: CTA of the chest was performed after the administration of intravenous contrast.  Multiplanar reformatted images are provided for review. MIP images are provided for review. Automated exposure control, iterative reconstruction, and/or weight based adjustment of the mA/kV was utilized to reduce the radiation dose to as low as reasonably achievable.; CT of the abdomen and pelvis was performed with the administration of intravenous contrast. Multiplanar reformatted images are provided for review. Automated exposure control, iterative reconstruction, and/or weight based adjustment of the mA/kV was utilized to reduce the radiation dose to as low as reasonably achievable.  COMPARISON: None CT abdomen pelvis 06/22/2022 HISTORY: ORDERING SYSTEM PROVIDED HISTORY: elevated D dimer, SOB TECHNOLOGIST PROVIDED HISTORY: elevated D dimer, SOB Decision Support Exception - unselect if not a suspected or confirmed emergency medical condition->Emergency Medical Condition (MA) Reason for Exam: elevated D dimer, SOB Relevant Medical/Surgical History: hx. breast.  No axillary adenopathy. Abdomen/Pelvis: Organs: Again noted are innumerable hypodense masses in the liver compatible with hepatic metastases. There is poor delineation of the gallbladder from the adjacent liver masses. There is cholelithiasis as well as choledocholithiasis with intrahepatic and extrahepatic biliary ductal dilatation which is overall similar to 5 days prior. The abnormal soft tissue attenuation extends toward the gastroduodenal junction with loss of the intervening fat plane. The spleen is normal in size and attenuation. The pancreas is atrophic. No peripancreatic inflammatory changes. Adrenal glands remain normal caliber. Kidneys enhance symmetrically aside from a tiny hypodensity in the lateral upper pole left kidney that is too small to definitively characterize. There is no hydronephrosis. Normal caliber ureters. GI/Bowel: The bowel remains normal in caliber without obstruction. An appendix is not definitively identified. Pelvis: The urinary bladder is normal in appearance. Diminutive postmenopausal female pelvic organs. Peritoneum/Retroperitoneum: No free air. Small volume ascites. Irregular peritoneal soft tissue nodularity is redemonstrated compatible with peritoneal carcinomatosis. Enlarged lymph nodes about the yi hepatis. No gross pelvic adenopathy. Infrarenal abdominal aortic aneurysm measuring up to 3.1 cm on a background of moderate to heavy atherosclerosis. Bones/Soft Tissues: Degenerative changes predominating in the mid to lower lumbar facets. No lytic or blastic osseous lesion. Small fat containing umbilical hernia with a peritoneal nodule at the hernia orifice. Mild anasarca. [CHEST Small pulmonary emboli in the right upper and right middle lobes with an overall small clot burden. Increased right pleural effusion and associated atelectasis of the right lung.  Indeterminate 4 mm nodule in the left upper lobe which will warrant attention on follow-up in this patient with findings of intra-abdominal malignancy. Morphologically abnormal appearing supradiaphragmatic lymph nodes on the right suspicious for possible intrathoracic metastatic josh spread. ABDOMEN/PELVIS Findings in the abdomen and pelvis are overall not substantially changed from 5 days prior. Diffuse hepatic metastases with loss of the fat plane between the liver and the gastroduodenal junction worrisome for possible direct extension. Poor delineation of the gallbladder either due to extension of the hepatic disease or possibly due to primary biliary neoplasm. Cholelithiasis and choledocholithiasis with intrahepatic and extrahepatic biliary ductal dilatation, unchanged from recent prior. Peritoneal carcinomatosis and small volume presumably malignant ascites. Infrarenal abdominal aortic aneurysm spanning up to 3.1 cm with recommendations below. Critical results were called by Dr. Hang Mclaughlin to Dr. Leighton Chacko on 6/27/2022 at 10:22 PM EST. RECOMMENDATIONS: 3.1 cm infrarenal abdominal aortic aneurysm. Recommend follow-up every 3 years. Reference: J Am Vish Radiol 1084;50:902-687. XR CHEST PORTABLE    Result Date: 6/27/2022  EXAMINATION: ONE XRAY VIEW OF THE CHEST 6/27/2022 4:39 pm COMPARISON: 03/15/2010 HISTORY: ORDERING SYSTEM PROVIDED HISTORY: SOB, CP TECHNOLOGIST PROVIDED HISTORY: SOB, CP Reason for Exam: PT CO cough with SOB and CP X several days. FINDINGS: Postsurgical changes overlying the mediastinum. The cardiac size is normal. Mild bibasal fibrosis and small right pleural effusion. .  Pulmonary vascularity appears normal. There is mild ectasia of the thoracic aorta. There are degenerative changes in the spine . No acute bony abnormalities. The hilar structures are normal.     Mild bibasal fibrosis and small right pleural effusion.      CT CHEST PULMONARY EMBOLISM W CONTRAST    Result Date: 6/27/2022  EXAMINATION: CTA OF THE CHEST; CT OF THE ABDOMEN AND PELVIS WITH CONTRAST 6/27/2022 6:09 pm; 6/27/2022 6:20 pm TECHNIQUE: CTA of the chest was performed after the administration of intravenous contrast.  Multiplanar reformatted images are provided for review. MIP images are provided for review. Automated exposure control, iterative reconstruction, and/or weight based adjustment of the mA/kV was utilized to reduce the radiation dose to as low as reasonably achievable.; CT of the abdomen and pelvis was performed with the administration of intravenous contrast. Multiplanar reformatted images are provided for review. Automated exposure control, iterative reconstruction, and/or weight based adjustment of the mA/kV was utilized to reduce the radiation dose to as low as reasonably achievable. COMPARISON: None CT abdomen pelvis 06/22/2022 HISTORY: ORDERING SYSTEM PROVIDED HISTORY: elevated D dimer, SOB TECHNOLOGIST PROVIDED HISTORY: elevated D dimer, SOB Decision Support Exception - unselect if not a suspected or confirmed emergency medical condition->Emergency Medical Condition (MA) Reason for Exam: elevated D dimer, SOB Relevant Medical/Surgical History: hx. of smoking and COPD. pt. states \"I was here lst week and they said I have cancer of some kind;not sure what kind yet. \"; ORDERING SYSTEM PROVIDED HISTORY: worsened abd pain, hx metastatic ca TECHNOLOGIST PROVIDED HISTORY: worsened abd pain, hx metastatic ca Decision Support Exception - unselect if not a suspected or confirmed emergency medical condition->Emergency Medical Condition (MA) Reason for Exam: pt. c/o SOB, body pains Relevant Medical/Surgical History: hx. of smoking, COPD. pt. states \"I was ere last week, and they said I have some kind of cancer. \" FINDINGS: Chest: Pulmonary Arteries: Pulmonary arteries are adequately opacified for evaluation.   There are small filling defects in the right interlobar artery extending into the posterior segmental and subsegmental right upper lobe branches and to a lesser degree into the segmental branches of the medial segment right middle lobe with overall small clot burden. No saddle embolus. Main pulmonary artery is normal in caliber. Mediastinum: No enlarged mediastinal nodes with a lower paratracheal node anterior to the sumit and some subcarinal nodes which are upper limits of normal in caliber. Morphologically abnormal suspicious right supradiaphragmatic lymph nodes. Cardiomegaly. Three-vessel coronary artery calcifications. No pericardial effusion. There is no acute abnormality of the thoracic aorta. Lungs/pleura: Increased right pleural effusion with increased atelectatic changes in the dependent right lung parenchyma, greatest in the right lower lobe. Minimal left lower lobe atelectasis. Indeterminate 4 mm solid noncalcified nodule in the anterior segment left upper lobe. No pneumothorax or left-sided pleural effusion. Central airways are patent. Soft Tissues/Bones: Degenerative changes without lytic or blastic osseous lesion. Remote median sternotomy which is healed. Calcifications in both breasts with a rim calcified likely oil cyst in the medial right breast.  No axillary adenopathy. Abdomen/Pelvis: Organs: Again noted are innumerable hypodense masses in the liver compatible with hepatic metastases. There is poor delineation of the gallbladder from the adjacent liver masses. There is cholelithiasis as well as choledocholithiasis with intrahepatic and extrahepatic biliary ductal dilatation which is overall similar to 5 days prior. The abnormal soft tissue attenuation extends toward the gastroduodenal junction with loss of the intervening fat plane. The spleen is normal in size and attenuation. The pancreas is atrophic. No peripancreatic inflammatory changes. Adrenal glands remain normal caliber. Kidneys enhance symmetrically aside from a tiny hypodensity in the lateral upper pole left kidney that is too small to definitively characterize. There is no hydronephrosis. Normal caliber ureters. GI/Bowel: The bowel remains normal in caliber without obstruction. An appendix is not definitively identified. Pelvis: The urinary bladder is normal in appearance. Diminutive postmenopausal female pelvic organs. Peritoneum/Retroperitoneum: No free air. Small volume ascites. Irregular peritoneal soft tissue nodularity is redemonstrated compatible with peritoneal carcinomatosis. Enlarged lymph nodes about the yi hepatis. No gross pelvic adenopathy. Infrarenal abdominal aortic aneurysm measuring up to 3.1 cm on a background of moderate to heavy atherosclerosis. Bones/Soft Tissues: Degenerative changes predominating in the mid to lower lumbar facets. No lytic or blastic osseous lesion. Small fat containing umbilical hernia with a peritoneal nodule at the hernia orifice. Mild anasarca. [CHEST Small pulmonary emboli in the right upper and right middle lobes with an overall small clot burden. Increased right pleural effusion and associated atelectasis of the right lung. Indeterminate 4 mm nodule in the left upper lobe which will warrant attention on follow-up in this patient with findings of intra-abdominal malignancy. Morphologically abnormal appearing supradiaphragmatic lymph nodes on the right suspicious for possible intrathoracic metastatic josh spread. ABDOMEN/PELVIS Findings in the abdomen and pelvis are overall not substantially changed from 5 days prior. Diffuse hepatic metastases with loss of the fat plane between the liver and the gastroduodenal junction worrisome for possible direct extension. Poor delineation of the gallbladder either due to extension of the hepatic disease or possibly due to primary biliary neoplasm. Cholelithiasis and choledocholithiasis with intrahepatic and extrahepatic biliary ductal dilatation, unchanged from recent prior. Peritoneal carcinomatosis and small volume presumably malignant ascites.  Infrarenal abdominal aortic aneurysm spanning up to 3.1 cm with recommendations below. Critical results were called by Dr. Lalo Bray to Dr. Humera Rios on 6/27/2022 at 10:22 PM EST. RECOMMENDATIONS: 3.1 cm infrarenal abdominal aortic aneurysm. Recommend follow-up every 3 years. Reference: J Am Vish Radiol 3734;97:816-300. VL DUP LOWER EXTREMITY VENOUS BILATERAL    Result Date: 6/28/2022    [address] [place] [address]  Vascular Lower Extremities DVT Study Procedure   Patient Name    [facility]  Date of Study             06/28/2022                  Soledad Gilliam   Date of Birth   1950   Gender                    Female   Age             81 year(s)   Race                         Room Number     2095   Corporate ID #  [id number]   Patient Acct #  [de-identified]   MR #            [id number]       [facility]   Accession #     [id number]   Interpreting Physician    Adal Rollins   Referring Nurse              Referring Physician       Kirstie Cohen DO  Practitioner  Procedure Type of Study:   Veins: Lower Extremities DVT Study, Venous Scan Lower Bilateral.  Indications for Study:R/O DVT, Leg Swelling and Pulmonary Embolism. Patient Status: In Patient. Technical Quality:Limited visualization. Conclusions   Summary   Technically limited visualization. No evidence of superficial or deep venous thrombosis in both lower  extremities. Signature   ----------------------------------------------------------------  Electronically signed by Pedro Garcia(Sonographer) on  06/28/2022 09:04 AM  ----------------------------------------------------------------   ----------------------------------------------------------------  Electronically signed by Mono Waldron(Interpreting physician)  on 06/28/2022 05:52 PM  ----------------------------------------------------------------  Findings:   Right Impression:                    Left Impression:  Non visualization of the posterior   Non visualization of the posterior  tibial and peroneal veins. tibial and peroneal veins.    The common femoral, femoral,         The common femoral, femoral,  popliteal and tibial veins           popliteal and tibial veins  demonstrate normal compressibility. demonstrate normal compressibility. Normal compressibility of the great  Normal compressibility of the great  saphenous vein. saphenous vein. Normal compressibility of the small  Normal compressibility of the small  saphenous vein. saphenous vein. Velocities are measured in cm/s ; Diameters are measured in cm Right Lower Extremities DVT Study Measurements Right 2D Measurements +------------------------------------+----------+---------------+----------+ ! Location                            ! Visualized! Compressibility! Thrombosis! +------------------------------------+----------+---------------+----------+ ! Common Femoral                      !Yes       ! Yes            ! None      ! +------------------------------------+----------+---------------+----------+ ! Prox Femoral                        !Yes       ! Yes            ! None      ! +------------------------------------+----------+---------------+----------+ ! Mid Femoral                         !Yes       ! Yes            ! None      ! +------------------------------------+----------+---------------+----------+ ! Dist Femoral                        !Yes       ! Yes            ! None      ! +------------------------------------+----------+---------------+----------+ ! Popliteal                           !Yes       ! Yes            ! None      ! +------------------------------------+----------+---------------+----------+ ! Sapheno Femoral Junction            ! Yes       ! Yes            ! None      ! +------------------------------------+----------+---------------+----------+ ! PTV                                 ! No        !               !          ! +------------------------------------+----------+---------------+----------+ ! Peroneal                            !No !               !          ! +------------------------------------+----------+---------------+----------+ ! Gastroc                             ! Yes       ! Yes            ! None      ! +------------------------------------+----------+---------------+----------+ ! GSV Thigh                           ! Yes       ! Yes            ! None      ! +------------------------------------+----------+---------------+----------+ ! GSV Knee                            ! Yes       ! Yes            ! None      ! +------------------------------------+----------+---------------+----------+ ! GSV Ankle                           ! Yes       ! Yes            ! None      ! +------------------------------------+----------+---------------+----------+ ! SSV                                 ! Yes       ! Yes            ! None      ! +------------------------------------+----------+---------------+----------+ Right Doppler Measurements +---------------------------+------+------+--------------------------------+ ! Location                   ! Signal!Reflux! Reflux (msec)                   ! +---------------------------+------+------+--------------------------------+ ! Common Femoral             !Phasic!      !                                ! +---------------------------+------+------+--------------------------------+ ! Prox Femoral               !Phasic!      !                                ! +---------------------------+------+------+--------------------------------+ ! Popliteal                  !Phasic!      !                                ! +---------------------------+------+------+--------------------------------+ Left Lower Extremities DVT Study Measurements Left 2D Measurements +------------------------------------+----------+---------------+----------+ ! Location                            ! Visualized! Compressibility! Thrombosis! +------------------------------------+----------+---------------+----------+ ! Common Femoral                      !Yes       ! Yes !None      ! +------------------------------------+----------+---------------+----------+ ! Prox Femoral                        !Yes       ! Yes            ! None      ! +------------------------------------+----------+---------------+----------+ ! Mid Femoral                         !Yes       ! Yes            ! None      ! +------------------------------------+----------+---------------+----------+ ! Dist Femoral                        !Yes       ! Yes            ! None      ! +------------------------------------+----------+---------------+----------+ ! Popliteal                           !Yes       ! Yes            ! None      ! +------------------------------------+----------+---------------+----------+ ! Sapheno Femoral Junction            ! Yes       ! Yes            ! None      ! +------------------------------------+----------+---------------+----------+ ! PTV                                 ! No        !               !          ! +------------------------------------+----------+---------------+----------+ ! Peroneal                            !No        !               !          ! +------------------------------------+----------+---------------+----------+ ! Gastroc                             ! Yes       ! Yes            ! None      ! +------------------------------------+----------+---------------+----------+ ! GSV Thigh                           ! Yes       ! Yes            ! None      ! +------------------------------------+----------+---------------+----------+ ! GSV Knee                            ! Yes       ! Yes            ! None      ! +------------------------------------+----------+---------------+----------+ ! GSV Ankle                           ! Yes       ! Yes            ! None      ! +------------------------------------+----------+---------------+----------+ ! SSV                                 ! Yes       ! Yes            ! None      ! +------------------------------------+----------+---------------+----------+ Left Doppler Measurements +---------------------------+------+------+--------------------------------+ ! Location                   ! Signal!Reflux! Reflux (msec)                   ! +---------------------------+------+------+--------------------------------+ ! Common Femoral             !Phasic!      !                                ! +---------------------------+------+------+--------------------------------+ ! Prox Femoral               !Phasic!      !                                ! +---------------------------+------+------+--------------------------------+ ! Popliteal                  !Phasic!      !                                ! +---------------------------+------+------+--------------------------------+            On admission         Review of Systems:     As recorded in HPI          Physical Examination:        Vitals:    07/02/22 2120 07/02/22 2330 07/03/22 0645 07/03/22 0748   BP:  (!) 90/52 (!) 79/45    Pulse: 75 78 71    Resp:  16 16    Temp:  98.1 °F (36.7 °C) 98.6 °F (37 °C)    TempSrc:  Oral Oral    SpO2:  94% 96% 96%   Weight:  230 lb 2.6 oz (104.4 kg)     Height:           Recent Labs     07/01/22  1956 07/02/22  0705 07/02/22  1132 07/02/22  1630   POCGLU 148* 139* 131* 130*       Intake/Output Summary (Last 24 hours) at 7/3/2022 0846  Last data filed at 7/3/2022 3355  Gross per 24 hour   Intake 771 ml   Output --   Net 771 ml       General Appearance:  alert, well appearing, and in no acute distress, central obesity present  Mental status:   Head:  normocephalic, atraumatic.   Eye: no icterus, redness, pupils equal and reactive, extraocular eye movements intact, conjunctiva clear  Ear: normal external ear, no discharge, hearing intact  Nose:  no drainage noted  Mouth: mucous membranes moist  Neck: supple, no carotid bruits, thyroid not palpable  Lungs: Bilateral equal air entry, clear to ausculation, no wheezing, rales or rhonchi, normal effort  Cardiovascular: normal rate, regular rhythm, no chloride flush          Assessment:        Primary Problem  Pulmonary embolus (HCC)    Principal Problem:    Pulmonary embolus (HCC)  Active Problems:    Liver mass    Diabetes mellitus type 2 in obese (HCC)    Morbid obesity ([address]  Resolved Problems:    * No resolved hospital problems. 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 Phillip Story yesterday  Patient, will have liver biopsy today, likely start on Eliquis tomorrow  Will be on Plavix and Eliquis long-term, will avoid triple therapy  Patient will need placement    6/30   Patient, clinically doing better  Started patient on Plavix, changing heparin drip to Eliquis  Patient need to be on Eliquis and Plavix for rest of her life  Will discontinue aspirin    To patient length  Patient will be getting discharge to SNF, awaiting placement  Overall prognosis guarded  No bleeding from the local site  7/2  Patient, had episode of chest pain  Evaluated by cardiologist  Chest pain determined to be atypical, constant in nature  DC planning, awaiting placement    7/3   Patient has episode of hypotension  Although patient is completely asymptomatic  Holding lisinopril and Lopressor  Chest pain is improved  Biopsy report pending  Awaiting placement  We will recheck blood pressure   .   Hospital Problems           Last Modified POA    * (Principal) Pulmonary embolus ([address] 6/28/2022 Yes    Liver mass 6/28/2022 Yes    Diabetes mellitus type 2 in obese ([address] 6/28/2022 Yes    Morbid obesity ([facility] 6/28/2022 Yes                         Thanks for consulting us . Will monitor vitals and clinical course , and  Optimize therapy  as needed .            Codie Macias MD  7/3/2022

## 2022-07-03 NOTE — CARE COORDINATION
ONGOING DISCHARGE PLANNING NOTE:    Writer reviewed LSW notes, and discharge plan is [employer] with Trudi Long started 06/30.     Electronically signed by Miller Garcia RN on 7/3/2022 at 11:39 AM

## 2022-07-03 NOTE — FLOWSHEET NOTE
07/03/22 1427   Encounter Summary   Encounter Overview/Reason  Spiritual/Emotional Needs   Service Provided For: Patient   Referral/Consult From: Palliative Care   Last Encounter  07/03/22   Complexity of Encounter Low   Spiritual/Emotional needs   Type Spiritual Support   Palliative Care   Type Palliative Care, Follow-up   Assessment/Intervention/Outcome   Assessment Unable to assess  (sleeping)   Intervention Prayer (assurance of)/[place]

## 2022-07-04 LAB
ANION GAP SERPL CALCULATED.3IONS-SCNC: 10 MMOL/L (ref 9–17)
BUN BLDV-MCNC: 8 MG/DL (ref 8–23)
CALCIUM SERPL-MCNC: 8.6 MG/DL (ref 8.6–10.4)
CHLORIDE BLD-SCNC: 97 MMOL/L (ref 98–107)
CO2: 28 MMOL/L (ref 20–31)
CREAT SERPL-MCNC: 0.5 MG/DL (ref 0.5–0.9)
GFR AFRICAN AMERICAN: >60 ML/MIN
GFR NON-AFRICAN AMERICAN: >60 ML/MIN
GFR SERPL CREATININE-BSD FRML MDRD: ABNORMAL ML/MIN/{1.73_M2}
GLUCOSE BLD-MCNC: 120 MG/DL (ref 65–105)
GLUCOSE BLD-MCNC: 121 MG/DL (ref 65–105)
GLUCOSE BLD-MCNC: 121 MG/DL (ref 70–99)
POTASSIUM SERPL-SCNC: 3.8 MMOL/L (ref 3.7–5.3)
SODIUM BLD-SCNC: 135 MMOL/L (ref 135–144)

## 2022-07-04 PROCEDURE — 99232 SBSQ HOSP IP/OBS MODERATE 35: CPT | Performed by: INTERNAL MEDICINE

## 2022-07-04 PROCEDURE — 94640 AIRWAY INHALATION TREATMENT: CPT

## 2022-07-04 PROCEDURE — 2060000000 HC ICU INTERMEDIATE R&B

## 2022-07-04 PROCEDURE — 2700000000 HC OXYGEN THERAPY PER DAY

## 2022-07-04 PROCEDURE — 97530 THERAPEUTIC ACTIVITIES: CPT

## 2022-07-04 PROCEDURE — 6370000000 HC RX 637 (ALT 250 FOR IP): Performed by: NURSE PRACTITIONER

## 2022-07-04 PROCEDURE — 2580000003 HC RX 258: Performed by: NURSE PRACTITIONER

## 2022-07-04 PROCEDURE — 94761 N-INVAS EAR/PLS OXIMETRY MLT: CPT

## 2022-07-04 PROCEDURE — 82947 ASSAY GLUCOSE BLOOD QUANT: CPT

## 2022-07-04 PROCEDURE — 80048 BASIC METABOLIC PNL TOTAL CA: CPT

## 2022-07-04 PROCEDURE — 6370000000 HC RX 637 (ALT 250 FOR IP): Performed by: INTERNAL MEDICINE

## 2022-07-04 PROCEDURE — 36415 COLL VENOUS BLD VENIPUNCTURE: CPT

## 2022-07-04 RX ORDER — CALCIUM CARBONATE 200(500)MG
500 TABLET,CHEWABLE ORAL 3 TIMES DAILY PRN
Status: DISCONTINUED | OUTPATIENT
Start: 2022-07-04 | End: 2022-07-05 | Stop reason: HOSPADM

## 2022-07-04 RX ADMIN — OXYBUTYNIN CHLORIDE 5 MG: 5 TABLET ORAL at 20:19

## 2022-07-04 RX ADMIN — VENLAFAXINE HYDROCHLORIDE 150 MG: 150 CAPSULE, EXTENDED RELEASE ORAL at 08:15

## 2022-07-04 RX ADMIN — VENLAFAXINE HYDROCHLORIDE 75 MG: 75 CAPSULE, EXTENDED RELEASE ORAL at 08:13

## 2022-07-04 RX ADMIN — SODIUM CHLORIDE, PRESERVATIVE FREE 10 ML: 5 INJECTION INTRAVENOUS at 08:15

## 2022-07-04 RX ADMIN — ATORVASTATIN CALCIUM 80 MG: 80 TABLET, FILM COATED ORAL at 08:15

## 2022-07-04 RX ADMIN — SODIUM CHLORIDE, PRESERVATIVE FREE 10 ML: 5 INJECTION INTRAVENOUS at 20:20

## 2022-07-04 RX ADMIN — BUDESONIDE AND FORMOTEROL FUMARATE DIHYDRATE 2 PUFF: 160; 4.5 AEROSOL RESPIRATORY (INHALATION) at 20:48

## 2022-07-04 RX ADMIN — RANOLAZINE 500 MG: 500 TABLET, EXTENDED RELEASE ORAL at 20:19

## 2022-07-04 RX ADMIN — APIXABAN 10 MG: 5 TABLET, FILM COATED ORAL at 08:13

## 2022-07-04 RX ADMIN — GABAPENTIN 300 MG: 600 TABLET, FILM COATED ORAL at 08:14

## 2022-07-04 RX ADMIN — CLOPIDOGREL BISULFATE 75 MG: 75 TABLET ORAL at 08:14

## 2022-07-04 RX ADMIN — ARIPIPRAZOLE 5 MG: 5 TABLET ORAL at 08:15

## 2022-07-04 RX ADMIN — OXYCODONE HYDROCHLORIDE 5 MG: 5 TABLET ORAL at 05:29

## 2022-07-04 RX ADMIN — MULTIPLE VITAMINS W/ MINERALS TAB 1 TABLET: TAB at 08:13

## 2022-07-04 RX ADMIN — PANTOPRAZOLE SODIUM 40 MG: 40 TABLET, DELAYED RELEASE ORAL at 05:29

## 2022-07-04 RX ADMIN — GABAPENTIN 300 MG: 600 TABLET, FILM COATED ORAL at 14:07

## 2022-07-04 RX ADMIN — GABAPENTIN 300 MG: 600 TABLET, FILM COATED ORAL at 20:19

## 2022-07-04 RX ADMIN — OXYBUTYNIN CHLORIDE 5 MG: 5 TABLET ORAL at 08:13

## 2022-07-04 RX ADMIN — APIXABAN 10 MG: 5 TABLET, FILM COATED ORAL at 20:19

## 2022-07-04 RX ADMIN — OXYCODONE HYDROCHLORIDE 10 MG: 10 TABLET, FILM COATED, EXTENDED RELEASE ORAL at 08:14

## 2022-07-04 RX ADMIN — BUDESONIDE AND FORMOTEROL FUMARATE DIHYDRATE 2 PUFF: 160; 4.5 AEROSOL RESPIRATORY (INHALATION) at 08:48

## 2022-07-04 RX ADMIN — OXYCODONE HYDROCHLORIDE 10 MG: 10 TABLET, FILM COATED, EXTENDED RELEASE ORAL at 20:18

## 2022-07-04 RX ADMIN — TIOTROPIUM BROMIDE INHALATION SPRAY 2 PUFF: 3.12 SPRAY, METERED RESPIRATORY (INHALATION) at 08:48

## 2022-07-04 RX ADMIN — RANOLAZINE 500 MG: 500 TABLET, EXTENDED RELEASE ORAL at 08:13

## 2022-07-04 ASSESSMENT — PAIN DESCRIPTION - LOCATION
LOCATION: ABDOMEN
LOCATION: ABDOMEN

## 2022-07-04 ASSESSMENT — PAIN SCALES - GENERAL
PAINLEVEL_OUTOF10: 8
PAINLEVEL_OUTOF10: 8

## 2022-07-04 ASSESSMENT — PAIN DESCRIPTION - DESCRIPTORS: DESCRIPTORS: ACHING

## 2022-07-04 NOTE — CARE COORDINATION
Pt accepted at [facility] and pre-cert was started 0/30. Pre-cert is still pending at this time. SW will continue to follow for authorization.

## 2022-07-04 NOTE — PROGRESS NOTES
Today's Date: 7/4/2022  Patient Name: Dilia Michael  Date of admission: 6/27/2022  6:49 PM  Patient's age: 81 y.o., 1950  Admission Dx: Pulmonary embolism on right [facility]) [I26.99]    Reason for Consult: PE  Requesting Physician: Harriet Tong MD    CHIEF COMPLAINT:    Chief Complaint   Patient presents with    Fatigue    Abdominal Pain     SUBJECTIVE:  .patient was seen and examined. No events overnight. Pain is controlled on current treatment. No fever. No hemoptysis. No other complaints. BRIEF CASE HISTORY:    Dilia Michael is a 81 y.o. female who is admitted to the hospital on 6/27/2022  for fatigue and abd pain. Patient was recently admitted to hospital about a week ago and at that time she had a CT scan of the abdomen which showed multiple liver lesions suspicious for metastasis. She was evaluated by oncology. Her tumor markers showed very high CA 19-9 and CEA and AFP. 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 IR guided biopsy was ordered but because patient was on Plavix plan was to do biopsy as outpatient. CT chest abdomen pelvis and CT chest showed small pulmonary emboli in the right upper and right middle lobes with very small clot burden. Oncology consulted for further evaluation. Past Medical History:   has a past medical history of Acid reflux, Arthritis, Cataracts, bilateral, CHF (congestive heart failure) ([address] COPD (chronic obstructive pulmonary disease) ([address] Gall stones, Head injury, Hyperlipidemia, Hypertension, Insomnia, MI (myocardial infarction) ([address] Neuropathy, Sleep apnea, and Type 2 diabetes mellitus without complication ([address] Past Surgical History:   has a past surgical history that includes Femur Surgery; Foot surgery (Left); Mandible fracture surgery; Cardiac surgery (1999); Ankle surgery (Left); Tonsillectomy; Coronary angioplasty with stent (1999); and IR BIOPSY LIVER PERCUTANEOUS (6/29/2022).      Medications:    Prior to Admission medications    Medication Sig Start Date End Date Taking? Authorizing Provider   oxyCODONE (OXYCONTIN) 10 MG extended release tablet Take 1 tablet by mouth every 12 hours for 5 days. 7/1/22 7/6/22 Yes Sun Prather MD   ALPRAZolam [facility]) 0.5 MG tablet Take 1 tablet by mouth 3 times daily as needed for Anxiety for up to 5 days. 7/1/22 7/6/22 Yes Sun Prather MD   oxyCODONE HCl 5 MG TABA Take 5 mg by mouth 4 times daily as needed (moderate to severe pain) for up to 5 days. 6/30/22 7/5/22 Yes Harriet Tong MD   apixaban (ELIQUIS) 5 MG TABS tablet Take 2 tablets by mouth 2 times daily for 14 doses 6/30/22 7/7/22 Yes Harriet Tong MD   apixaban (ELIQUIS) 5 MG TABS tablet Take 1 tablet by mouth 2 times daily 7/8/22 8/7/22 Yes Harriet Tong MD   gabapentin (NEURONTIN) 600 MG tablet Take 1 tablet by mouth 3 times daily for 3 days.  6/30/22 7/3/22 Yes Harriet Tong MD   clopidogrel (PLAVIX) 75 MG tablet Take 1 tablet by mouth daily 6/30/22  Yes Harriet Tong MD   glucose monitoring (FREESTYLE FREEDOM) kit 1 kit by Does not apply route daily as needed (diabetic) 6/24/22   Joey Velazquez MD   FreeStyle Lancets [address] [facility] 1 each by Does not apply route daily 6/24/22   Joey Velazquez MD   Blood Glucose Monitoring Suppl (FREESTYLE LITE) STACY 1 Device by Does not apply route daily as needed (diabetic) 6/24/22   Joey Velazquez MD   Blood Glucose Monitoring Suppl (FREESTYLE FREEDOM LITE) w/Device KIT 1 kit by Does not apply route daily 6/24/22   Joey Velazquez MD   Insulin Syringe-Needle U-100 30G X 5/16\" 0.5 ML MISC 1 each by Does not apply route daily 6/24/22   Joey Velazquez MD   albuterol sulfate  (90 Base) MCG/ACT inhaler Inhale 2 puffs into the lungs every 6 hours as needed for Wheezing or Shortness of Breath 5/26/21   BENNETT Song CNP   ranolazine (RANEXA) 500 MG extended release tablet Take 1 tablet by mouth 2 times daily 3/12/21   BENNETT Song CNP   lisinopril (PRINIVIL;ZESTRIL) 10 MG tablet take 1 tablet by mouth once daily 3/12/21   Lindaann Spray, APRN - CNP   venlafaxine (EFFEXOR XR) 150 MG extended release capsule Take 1 capsule by mouth daily Take with 75 mg capsule 2/26/21   Lindaann Spray, APRN - CNP   ARIPiprazole (ABILIFY) 5 MG tablet Take 1 tablet by mouth daily 2/26/21   Lindaann Garwin, APRN - CNP   budesonide-formoterol [facility]) 160-4.5 MCG/ACT AERO Inhale 2 puffs into the lungs 2 times daily 2/26/21   Lindaann Spray, APRN - CNP   tiotropium (SPIRIVA RESPIMAT) 2.5 MCG/ACT AERS inhaler Inhale 2 puffs into the lungs daily 2/26/21   Nikkoaann Spray, APRN - CNP   traZODone (DESYREL) 50 MG tablet take 1 tablet by mouth at bedtime if needed for sleep 12/30/20   Historical Provider, MD   oxybutynin (DITROPAN) 5 MG tablet take 1 tablet by mouth twice a day 2/26/21   Nikkoaann Spray, APRN - CNP   glucose monitoring kit (FREESTYLE) monitoring kit 1 kit by Does not apply route daily 10/3/20   Rolandnn Spray, APRN - CNP   blood glucose monitor strips Test 3 times a day & as needed for symptoms of irregular blood glucose. Dispense sufficient amount for indicated testing frequency plus additional to accommodate PRN testing needs.  10/3/20   Lindaann Spray, APRN - CNP   Lancets MISC 1 each by Does not apply route 3 times daily 10/3/20   Lindaann Garwin, APRN - CNP   atorvastatin (LIPITOR) 80 MG tablet Take 1 tablet by mouth daily 9/30/20   Lindaann Spray, APRN - CNP   bisoprolol (ZEBETA) 5 MG tablet take 1 tablet by mouth once daily 9/30/20   Lindaann Spray, APRN - CNP   omeprazole (PRILOSEC) 40 MG delayed release capsule take 1 capsule by mouth every morning BEFORE BREAKFAST 9/30/20   Nikkoaann Spray, APRN - CNP   venlafaxine (EFFEXOR XR) 75 MG extended release capsule Take 1 capsule by mouth daily 9/30/20   Nikkoaann Garwin, APRN - CNP   glimepiride (AMARYL) 2 MG tablet take 1 tablet by mouth twice a day before meals 9/30/20   Dimitrios Galarza, APRN - CNP   Semaglutide, 1 MG/DOSE, (OZEMPIC, 1 MG/DOSE,) 2 MG/1.5ML SOPN Inject 1 mg per week 9/30/20   BENNETT Almeida CNP   Multiple Vitamins-Minerals (THERAPEUTIC MULTIVITAMIN-MINERALS) tablet Take 1 tablet by mouth daily 9/30/20   BENNETT Almeida CNP   Melatonin 5 MG SUBL Place 1 applicator under the tongue nightly as needed (insomnia) 9/30/20   BENNETT Almeida CNP   Insulin Pen Needle 32G X 4 MM MISC 1 each by Does not apply route once a week 3/12/20   Duane Lei, MD   fluticasone [facility]) 50 MCG/ACT nasal spray 2 sprays by Nasal route daily 9/13/19   Duane Lei, MD     Current Facility-Administered Medications   Medication Dose Route Frequency Provider Last Rate Last Admin    gabapentin (NEURONTIN) tablet 300 mg  300 mg Oral TID Sara Velazquez MD   300 mg at 07/04/22 0814    docusate sodium (COLACE) capsule 100 mg  100 mg Oral BID PRN Sara Velazquez MD   100 mg at 07/03/22 0935    [Held by provider] lisinopril (PRINIVIL;ZESTRIL) tablet 5 mg  5 mg Oral Daily Nery Byrne MD        oxyCODONE (OXYCONTIN) extended release tablet 10 mg  10 mg Oral 2 times per day Matthias Weinberg MD   10 mg at 07/04/22 9956    ALPRAZolam (XANAX) tablet 0.5 mg  0.5 mg Oral TID PRN Matthias Weinberg MD   0.5 mg at 07/02/22 2113    apixaban (ELIQUIS) tablet 10 mg  10 mg Oral BID Sara Velazquez MD   10 mg at 07/04/22 0813    Followed by   Serafin Jacobson ON 7/7/2022] apixaban (ELIQUIS) tablet 5 mg  5 mg Oral BID Sara Velazquez MD        clopidogrel (PLAVIX) tablet 75 mg  75 mg Oral Daily Sara Velazquez MD   75 mg at 07/04/22 0814    melatonin tablet 6 mg  6 mg Oral Nightly PRN BENNETT Live CNP   6 mg at 06/30/22 2316    HYDROmorphone (DILAUDID) injection 0.5 mg  0.5 mg IntraVENous Q4H PRN BENNETT Live - CNP   0.5 mg at 07/02/22 2248    sodium chloride flush 0.9 % injection 5-40 mL  5-40 mL IntraVENous 2 times per day BENNETT Live - CNP   10 mL at 07/04/22 0815    sodium chloride flush 0.9 % injection 10 Inhalation Daily Cece Stuart APRN - CNP   2 puff at 07/04/22 0848    traZODone (DESYREL) tablet 50 mg  50 mg Oral Nightly PRN Cece Stuart APRN - CNP        venlafaxine (EFFEXOR XR) extended release capsule 150 mg  150 mg Oral Daily Cece Sade, APRN - CNP   150 mg at 07/04/22 0815    venlafaxine (EFFEXOR XR) extended release capsule 75 mg  75 mg Oral Daily Cece Stuart, APRN - CNP   75 mg at 07/04/22 0813    potassium chloride (KLOR-CON M) extended release tablet 40 mEq  40 mEq Oral PRN Darby Sears MD   40 mEq at 06/28/22 1436    Or    potassium bicarb-citric acid (EFFER-K) effervescent tablet 40 mEq  40 mEq Oral PRN Darby Sears MD        Or   [facility] potassium chloride 10 mEq/100 mL IVPB (Peripheral Line)  10 mEq IntraVENous PRN Darby Sears MD        perflutren lipid microspheres (DEFINITY) injection 1.65 mg  1.5 mL IntraVENous ONCE PRN Ratna Callahan MD        sodium chloride flush 0.9 % injection 10 mL  10 mL IntraVENous PRN Elif Souza DO   10 mL at 06/27/22 2118       Allergies:  Chantix [varenicline tartrate] and Sulphadimidine [sulfamethazine]    Social History:   reports that she quit smoking about 22 years ago. Her smoking use included cigarettes. She has a 40.00 pack-year smoking history. She has never used smokeless tobacco. She reports current alcohol use of about 1.0 standard drink of alcohol per week. She reports current drug use. Drug: Marijuana [facility]). Family History: family history includes Other in her father; Stroke in her mother. REVIEW OF SYSTEMS:    Constitutional: No fever or chills.  No night sweats, no weight loss   Eyes: No eye discharge, double vision, or eye pain   HEENT: negative for sore mouth, sore throat, hoarseness and voice change   Respiratory: negative for cough , sputum, dyspnea, wheezing, hemoptysis, chest pain   Cardiovascular: negative for chest pain, dyspnea, palpitations, orthopnea, PND   Gastrointestinal: negative for nausea, vomiting, diarrhea, constipation, abdominal pain, Dysphagia, hematemesis and hematochezia   Genitourinary: negative for frequency, dysuria, nocturia, urinary incontinence, and hematuria   Integument: negative for rash, skin lesions, bruises.    Hematologic/Lymphatic: negative for easy bruising, bleeding, lymphadenopathy, or petechiae   Endocrine: negative for heat or cold intolerance,weight changes, change in bowel habits and hair loss   Musculoskeletal: negative for myalgias, arthralgias, pain, joint swelling,and bone pain   Neurological: negative for headaches, dizziness, seizures, weakness, numbness    PHYSICAL EXAM:      /79   Pulse 88   Temp 98 °F (36.7 °C) (Oral)   Resp 18   Ht 5' (1.524 m)   Wt 222 lb 10.6 oz (101 kg)   SpO2 95%   BMI 43.49 kg/m²    Temp (24hrs), Av.4 °F (36.9 °C), Min:97.8 °F (36.6 °C), Max:99.2 °F (37.3 °C)    General appearance - well appearing, no in pain or distress   Mental status - alert and cooperative   Eyes - pupils equal and reactive, extraocular eye movements intact   Ears - bilateral TM's and external ear canals normal   Mouth - mucous membranes moist, pharynx normal without lesions   Neck - supple, no significant adenopathy   Lymphatics - no palpable lymphadenopathy, no hepatosplenomegaly   Chest - clear to auscultation, no wheezes, rales or rhonchi, symmetric air entry   Heart - normal rate, regular rhythm, normal S1, S2, no murmurs  Abdomen - soft, nontender, nondistended, no masses or organomegaly   Neurological - alert, oriented, normal speech, no focal findings or movement disorder noted   Musculoskeletal - no joint tenderness, deformity or swelling   Extremities - peripheral pulses normal, no pedal edema, no clubbing or cyanosis   Skin - normal coloration and turgor, no rashes, no suspicious skin lesions noted ,    DATA:    Labs:   CBC:   Recent Labs     22  0603   WBC 13.1*   HGB 10.2*   HCT 31.7*   *     BMP:   Recent Labs     22  0603 22  0709   NA 132* 135   K 3.9 3.8   CO2 26 28   BUN 9 8   CREATININE 0.71 0.50   LABGLOM >60 >60   GLUCOSE 113* 121*     PT/INR:   No results for input(s): PROTIME, INR in the last 72 hours. IMAGING DATA:  IR BIOPSY LIVER PERCUTANEOUS   Final Result   Successful liver biopsy under ultrasound guidance, as above. VL DUP LOWER EXTREMITY VENOUS BILATERAL   Final Result      CT ABDOMEN PELVIS W IV CONTRAST Additional Contrast? None   Final Result      CT CHEST PULMONARY EMBOLISM W CONTRAST   Final Result      XR CHEST PORTABLE   Final Result   Mild bibasal fibrosis and small right pleural effusion. Primary Problem  Pulmonary embolus [facility])    Active Hospital Problems    Diagnosis Date Noted    Pulmonary embolus ([address] [I26.99] 06/27/2022     Priority: Medium    Liver mass [R16.0]      Priority: Medium    Diabetes mellitus type 2 in obese ([address] [E11.69, E66.9] 02/18/2019    Morbid obesity ([address] [E66.01] 02/18/2019         IMPRESSION:   1. Metastatic disease with multiple liver lesion possibly pancreatobiliary origin,  2. Acute PE  3. Peritoneal carcinomatosis    RECOMMENDATIONS:  1. I reviewed the laboratory data, imaging studies, prior records, discussed diagnosis, prognosis and treatment recommendations  2. Continue heparin drip  3. Liver biopsy done 6/29. Pathology pending. 4. Continue other management as per primary team  5. Patient can be transition to NOAC at discharge with Eliquis  6. We will follow                            6 [facility]                            This note is created with the assistance of a speech recognition program.  While intending to generate a document that actually reflects the content of the visit, the document can still have some errors including those of syntax and sound a like substitutions which may escape proof reading. It such instances, actual meaning can be extrapolated by contextual diversion.

## 2022-07-04 NOTE — PROGRESS NOTES
[facility] Internal Medicine    Progress Note     7/4/2022    9:59 AM    Name:   Richard Bell  MRN:     [id number]     Acct:      [de-identified]   Room:   2095/2095-01   Day:  7  Admit Date:  6/27/2022  6:49 PM    PCP:   Devan Hyman MD  Code Status:  Full Code    Subjective:     C/C:   Chief Complaint   Patient presents with    Fatigue    Abdominal Pain     Principal Problem:    Pulmonary embolus ([address]  Active Problems:    Liver mass    Diabetes mellitus type 2 in obese ([facility] [address]    Morbid obesity ([address]  Resolved Problems:    * No resolved hospital problems.  *      Patient unfortunately has multiple medical problem, and she is not a very good historian  She has history of coronary artery disease s/p CABG back in 1999, COPD, diabetes, hypertension, patient was recently discharged from the hospital, when she was admitted abdominal pain, hypokalemia  CT abdomen pelvis was done concerning for possible pancreatobiliary malignancy with liver mets   Patient, underwent CT chest in the emergency room, suggestive of PE  On heparin drip  Patient plan for liver biopsy today  Interval history  Patient is feeling better, still having abdominal pain  Had 1 episode of vomiting yesterday  Evaluated by oncologist    7/3  Patient told me that she has not passed stools  Episode of hypotension, asymptomatic        Recent Results (from the past 24 hour(s))   POC Glucose Fingerstick    Collection Time: 07/03/22  8:42 PM   Result Value Ref Range    POC Glucose 150 (H) 65 - 105 mg/dL   POC Glucose Fingerstick    Collection Time: 07/04/22  6:39 AM   Result Value Ref Range    POC Glucose 120 (H) 65 - 105 mg/dL   Basic Metabolic Panel w/ Reflex to MG    Collection Time: 07/04/22  7:09 AM   Result Value Ref Range    Glucose 121 (H) 70 - 99 mg/dL    BUN 8 8 - 23 mg/dL    CREATININE 0.50 0.50 - 0.90 mg/dL    Calcium 8.6 8.6 - 10.4 mg/dL    Sodium 135 135 - 144 mmol/L    Potassium 3.8 3.7 - 5.3 mmol/L Chloride 97 (L) 98 - 107 mmol/L    CO2 28 20 - 31 mmol/L    Anion Gap 10 9 - 17 mmol/L    GFR Non-African American >60 >60 mL/min    GFR African American >60 >60 mL/min    GFR Comment           Recent Labs     07/02/22  0705 07/02/22  1132 07/02/22  1630 07/03/22  2042 07/04/22  0639   POCGLU 139* 131* 130* 150* 120*        CT ABDOMEN PELVIS W IV CONTRAST Additional Contrast? None    Result Date: 6/27/2022  EXAMINATION: CTA OF THE CHEST; CT OF THE ABDOMEN AND PELVIS WITH CONTRAST 6/27/2022 6:09 pm; 6/27/2022 6:20 pm TECHNIQUE: CTA of the chest was performed after the administration of intravenous contrast.  Multiplanar reformatted images are provided for review. MIP images are provided for review. Automated exposure control, iterative reconstruction, and/or weight based adjustment of the mA/kV was utilized to reduce the radiation dose to as low as reasonably achievable.; CT of the abdomen and pelvis was performed with the administration of intravenous contrast. Multiplanar reformatted images are provided for review. Automated exposure control, iterative reconstruction, and/or weight based adjustment of the mA/kV was utilized to reduce the radiation dose to as low as reasonably achievable. COMPARISON: None CT abdomen pelvis 06/22/2022 HISTORY: ORDERING SYSTEM PROVIDED HISTORY: elevated D dimer, SOB TECHNOLOGIST PROVIDED HISTORY: elevated D dimer, SOB Decision Support Exception - unselect if not a suspected or confirmed emergency medical condition->Emergency Medical Condition (MA) Reason for Exam: elevated D dimer, SOB Relevant Medical/Surgical History: hx. of smoking and COPD. pt. states \"I was here lst week and they said I have cancer of some kind;not sure what kind yet. \"; ORDERING SYSTEM PROVIDED HISTORY: worsened abd pain, hx metastatic ca TECHNOLOGIST PROVIDED HISTORY: worsened abd pain, hx metastatic ca Decision Support Exception - unselect if not a suspected or confirmed emergency medical condition->Emergency Medical Condition (MA) Reason for Exam: pt. c/o SOB, body pains Relevant Medical/Surgical History: hx. of smoking, COPD. pt. states \"I was ere last week, and they said I have some kind of cancer. \" FINDINGS: Chest: Pulmonary Arteries: Pulmonary arteries are adequately opacified for evaluation. There are small filling defects in the right interlobar artery extending into the posterior segmental and subsegmental right upper lobe branches and to a lesser degree into the segmental branches of the medial segment right middle lobe with overall small clot burden. No saddle embolus. Main pulmonary artery is normal in caliber. Mediastinum: No enlarged mediastinal nodes with a lower paratracheal node anterior to the sumit and some subcarinal nodes which are upper limits of normal in caliber. Morphologically abnormal suspicious right supradiaphragmatic lymph nodes. Cardiomegaly. Three-vessel coronary artery calcifications. No pericardial effusion. There is no acute abnormality of the thoracic aorta. Lungs/pleura: Increased right pleural effusion with increased atelectatic changes in the dependent right lung parenchyma, greatest in the right lower lobe. Minimal left lower lobe atelectasis. Indeterminate 4 mm solid noncalcified nodule in the anterior segment left upper lobe. No pneumothorax or left-sided pleural effusion. Central airways are patent. Soft Tissues/Bones: Degenerative changes without lytic or blastic osseous lesion. Remote median sternotomy which is healed. Calcifications in both breasts with a rim calcified likely oil cyst in the medial right breast.  No axillary adenopathy. Abdomen/Pelvis: Organs: Again noted are innumerable hypodense masses in the liver compatible with hepatic metastases. There is poor delineation of the gallbladder from the adjacent liver masses.   There is cholelithiasis as well as choledocholithiasis with intrahepatic and extrahepatic biliary ductal dilatation which is overall similar to 5 days prior. The abnormal soft tissue attenuation extends toward the gastroduodenal junction with loss of the intervening fat plane. The spleen is normal in size and attenuation. The pancreas is atrophic. No peripancreatic inflammatory changes. Adrenal glands remain normal caliber. Kidneys enhance symmetrically aside from a tiny hypodensity in the lateral upper pole left kidney that is too small to definitively characterize. There is no hydronephrosis. Normal caliber ureters. GI/Bowel: The bowel remains normal in caliber without obstruction. An appendix is not definitively identified. Pelvis: The urinary bladder is normal in appearance. Diminutive postmenopausal female pelvic organs. Peritoneum/Retroperitoneum: No free air. Small volume ascites. Irregular peritoneal soft tissue nodularity is redemonstrated compatible with peritoneal carcinomatosis. Enlarged lymph nodes about the yi hepatis. No gross pelvic adenopathy. Infrarenal abdominal aortic aneurysm measuring up to 3.1 cm on a background of moderate to heavy atherosclerosis. Bones/Soft Tissues: Degenerative changes predominating in the mid to lower lumbar facets. No lytic or blastic osseous lesion. Small fat containing umbilical hernia with a peritoneal nodule at the hernia orifice. Mild anasarca. [CHEST Small pulmonary emboli in the right upper and right middle lobes with an overall small clot burden. Increased right pleural effusion and associated atelectasis of the right lung. Indeterminate 4 mm nodule in the left upper lobe which will warrant attention on follow-up in this patient with findings of intra-abdominal malignancy. Morphologically abnormal appearing supradiaphragmatic lymph nodes on the right suspicious for possible intrathoracic metastatic josh spread. ABDOMEN/PELVIS Findings in the abdomen and pelvis are overall not substantially changed from 5 days prior.  Diffuse hepatic metastases with loss of the fat plane between the liver and the gastroduodenal junction worrisome for possible direct extension. Poor delineation of the gallbladder either due to extension of the hepatic disease or possibly due to primary biliary neoplasm. Cholelithiasis and choledocholithiasis with intrahepatic and extrahepatic biliary ductal dilatation, unchanged from recent prior. Peritoneal carcinomatosis and small volume presumably malignant ascites. Infrarenal abdominal aortic aneurysm spanning up to 3.1 cm with recommendations below. Critical results were called by Dr. Elo Anderson to Dr. Alexi Bhat on 6/27/2022 at 10:22 PM EST. RECOMMENDATIONS: 3.1 cm infrarenal abdominal aortic aneurysm. Recommend follow-up every 3 years. Reference: J Am Vish Radiol 0944;50:005-135. XR CHEST PORTABLE    Result Date: 6/27/2022  EXAMINATION: ONE XRAY VIEW OF THE CHEST 6/27/2022 4:39 pm COMPARISON: 03/15/2010 HISTORY: ORDERING SYSTEM PROVIDED HISTORY: SOB, CP TECHNOLOGIST PROVIDED HISTORY: SOB, CP Reason for Exam: PT CO cough with SOB and CP X several days. FINDINGS: Postsurgical changes overlying the mediastinum. The cardiac size is normal. Mild bibasal fibrosis and small right pleural effusion. .  Pulmonary vascularity appears normal. There is mild ectasia of the thoracic aorta. There are degenerative changes in the spine . No acute bony abnormalities. The hilar structures are normal.     Mild bibasal fibrosis and small right pleural effusion. CT CHEST PULMONARY EMBOLISM W CONTRAST    Result Date: 6/27/2022  EXAMINATION: CTA OF THE CHEST; CT OF THE ABDOMEN AND PELVIS WITH CONTRAST 6/27/2022 6:09 pm; 6/27/2022 6:20 pm TECHNIQUE: CTA of the chest was performed after the administration of intravenous contrast.  Multiplanar reformatted images are provided for review. MIP images are provided for review.  Automated exposure control, iterative reconstruction, and/or weight based adjustment of the mA/kV was utilized to reduce the radiation dose to as low as reasonably achievable.; CT of the abdomen and pelvis was performed with the administration of intravenous contrast. Multiplanar reformatted images are provided for review. Automated exposure control, iterative reconstruction, and/or weight based adjustment of the mA/kV was utilized to reduce the radiation dose to as low as reasonably achievable. COMPARISON: None CT abdomen pelvis 06/22/2022 HISTORY: ORDERING SYSTEM PROVIDED HISTORY: elevated D dimer, SOB TECHNOLOGIST PROVIDED HISTORY: elevated D dimer, SOB Decision Support Exception - unselect if not a suspected or confirmed emergency medical condition->Emergency Medical Condition (MA) Reason for Exam: elevated D dimer, SOB Relevant Medical/Surgical History: hx. of smoking and COPD. pt. states \"I was here lst week and they said I have cancer of some kind;not sure what kind yet. \"; ORDERING SYSTEM PROVIDED HISTORY: worsened abd pain, hx metastatic ca TECHNOLOGIST PROVIDED HISTORY: worsened abd pain, hx metastatic ca Decision Support Exception - unselect if not a suspected or confirmed emergency medical condition->Emergency Medical Condition (MA) Reason for Exam: pt. c/o SOB, body pains Relevant Medical/Surgical History: hx. of smoking, COPD. pt. states \"I was ere last week, and they said I have some kind of cancer. \" FINDINGS: Chest: Pulmonary Arteries: Pulmonary arteries are adequately opacified for evaluation. There are small filling defects in the right interlobar artery extending into the posterior segmental and subsegmental right upper lobe branches and to a lesser degree into the segmental branches of the medial segment right middle lobe with overall small clot burden. No saddle embolus. Main pulmonary artery is normal in caliber. Mediastinum: No enlarged mediastinal nodes with a lower paratracheal node anterior to the sumit and some subcarinal nodes which are upper limits of normal in caliber.   Morphologically abnormal suspicious right supradiaphragmatic lymph nodes. Cardiomegaly. Three-vessel coronary artery calcifications. No pericardial effusion. There is no acute abnormality of the thoracic aorta. Lungs/pleura: Increased right pleural effusion with increased atelectatic changes in the dependent right lung parenchyma, greatest in the right lower lobe. Minimal left lower lobe atelectasis. Indeterminate 4 mm solid noncalcified nodule in the anterior segment left upper lobe. No pneumothorax or left-sided pleural effusion. Central airways are patent. Soft Tissues/Bones: Degenerative changes without lytic or blastic osseous lesion. Remote median sternotomy which is healed. Calcifications in both breasts with a rim calcified likely oil cyst in the medial right breast.  No axillary adenopathy. Abdomen/Pelvis: Organs: Again noted are innumerable hypodense masses in the liver compatible with hepatic metastases. There is poor delineation of the gallbladder from the adjacent liver masses. There is cholelithiasis as well as choledocholithiasis with intrahepatic and extrahepatic biliary ductal dilatation which is overall similar to 5 days prior. The abnormal soft tissue attenuation extends toward the gastroduodenal junction with loss of the intervening fat plane. The spleen is normal in size and attenuation. The pancreas is atrophic. No peripancreatic inflammatory changes. Adrenal glands remain normal caliber. Kidneys enhance symmetrically aside from a tiny hypodensity in the lateral upper pole left kidney that is too small to definitively characterize. There is no hydronephrosis. Normal caliber ureters. GI/Bowel: The bowel remains normal in caliber without obstruction. An appendix is not definitively identified. Pelvis: The urinary bladder is normal in appearance. Diminutive postmenopausal female pelvic organs. Peritoneum/Retroperitoneum: No free air. Small volume ascites.   Irregular peritoneal soft tissue nodularity is redemonstrated compatible with peritoneal carcinomatosis. Enlarged lymph nodes about the yi hepatis. No gross pelvic adenopathy. Infrarenal abdominal aortic aneurysm measuring up to 3.1 cm on a background of moderate to heavy atherosclerosis. Bones/Soft Tissues: Degenerative changes predominating in the mid to lower lumbar facets. No lytic or blastic osseous lesion. Small fat containing umbilical hernia with a peritoneal nodule at the hernia orifice. Mild anasarca. [CHEST Small pulmonary emboli in the right upper and right middle lobes with an overall small clot burden. Increased right pleural effusion and associated atelectasis of the right lung. Indeterminate 4 mm nodule in the left upper lobe which will warrant attention on follow-up in this patient with findings of intra-abdominal malignancy. Morphologically abnormal appearing supradiaphragmatic lymph nodes on the right suspicious for possible intrathoracic metastatic josh spread. ABDOMEN/PELVIS Findings in the abdomen and pelvis are overall not substantially changed from 5 days prior. Diffuse hepatic metastases with loss of the fat plane between the liver and the gastroduodenal junction worrisome for possible direct extension. Poor delineation of the gallbladder either due to extension of the hepatic disease or possibly due to primary biliary neoplasm. Cholelithiasis and choledocholithiasis with intrahepatic and extrahepatic biliary ductal dilatation, unchanged from recent prior. Peritoneal carcinomatosis and small volume presumably malignant ascites. Infrarenal abdominal aortic aneurysm spanning up to 3.1 cm with recommendations below. Critical results were called by Dr. Abdon Leiva to Dr. Lynn Peña on 6/27/2022 at 10:22 PM EST. RECOMMENDATIONS: 3.1 cm infrarenal abdominal aortic aneurysm. Recommend follow-up every 3 years. Reference: J Am Vish Radiol 7632;27:272-702.      VL DUP LOWER EXTREMITY VENOUS BILATERAL    Result Date: 6/28/2022    [address] [facility] Lower Extremities DVT Study Procedure   Patient Name    [facility]  Date of Study             06/28/2022                  Grant Montalvo   Date of Birth   1950   Gender                    Female   Age             81 year(s)   Race                         Room Number     2095   Corporate ID #  [id number]   Patient Acct #  [de-identified]   MR #            [id number]       Clara Patino   Accession #     [id number]   Interpreting Physician    Mercedes Benson   Referring Nurse              Referring Physician       Amy Eduardo DO  Practitioner  Procedure Type of Study:   Veins: Lower Extremities DVT Study, Venous Scan Lower Bilateral.  Indications for Study:R/O DVT, Leg Swelling and Pulmonary Embolism. Patient Status: In Patient. Technical Quality:Limited visualization. Conclusions   Summary   Technically limited visualization. No evidence of superficial or deep venous thrombosis in both lower  extremities. Signature   ----------------------------------------------------------------  Electronically signed by Pedro Roque(Sonographer) on  06/28/2022 09:04 AM  ----------------------------------------------------------------   ----------------------------------------------------------------  Electronically signed by Mono Waldron(Interpreting physician)  on 06/28/2022 05:52 PM  ----------------------------------------------------------------  Findings:   Right Impression:                    Left Impression:  Non visualization of the posterior   Non visualization of the posterior  tibial and peroneal veins. tibial and peroneal veins. The common femoral, femoral,         The common femoral, femoral,  popliteal and tibial veins           popliteal and tibial veins  demonstrate normal compressibility. demonstrate normal compressibility. Normal compressibility of the great  Normal compressibility of the great  saphenous vein. saphenous vein.    Normal compressibility of the small  Normal compressibility of the small  saphenous vein. saphenous vein. Velocities are measured in cm/s ; Diameters are measured in cm Right Lower Extremities DVT Study Measurements Right 2D Measurements +------------------------------------+----------+---------------+----------+ ! Location                            ! Visualized! Compressibility! Thrombosis! +------------------------------------+----------+---------------+----------+ ! Common Femoral                      !Yes       ! Yes            ! None      ! +------------------------------------+----------+---------------+----------+ ! Prox Femoral                        !Yes       ! Yes            ! None      ! +------------------------------------+----------+---------------+----------+ ! Mid Femoral                         !Yes       ! Yes            ! None      ! +------------------------------------+----------+---------------+----------+ ! Dist Femoral                        !Yes       ! Yes            ! None      ! +------------------------------------+----------+---------------+----------+ ! Popliteal                           !Yes       ! Yes            ! None      ! +------------------------------------+----------+---------------+----------+ ! Sapheno Femoral Junction            ! Yes       ! Yes            ! None      ! +------------------------------------+----------+---------------+----------+ ! PTV                                 ! No        !               !          ! +------------------------------------+----------+---------------+----------+ ! Peroneal                            !No        !               !          ! +------------------------------------+----------+---------------+----------+ ! Gastroc                             ! Yes       ! Yes            ! None      ! +------------------------------------+----------+---------------+----------+ ! GSV Thigh                           ! Yes       ! Yes            ! None      ! +------------------------------------+----------+---------------+----------+ ! GSV Knee                            ! Yes       ! Yes            ! None      ! +------------------------------------+----------+---------------+----------+ ! GSV Ankle                           ! Yes       ! Yes            ! None      ! +------------------------------------+----------+---------------+----------+ ! SSV                                 ! Yes       ! Yes            ! None      ! +------------------------------------+----------+---------------+----------+ Right Doppler Measurements +---------------------------+------+------+--------------------------------+ ! Location                   ! Signal!Reflux! Reflux (msec)                   ! +---------------------------+------+------+--------------------------------+ ! Common Femoral             !Phasic!      !                                ! +---------------------------+------+------+--------------------------------+ ! Prox Femoral               !Phasic!      !                                ! +---------------------------+------+------+--------------------------------+ ! Popliteal                  !Phasic!      !                                ! +---------------------------+------+------+--------------------------------+ Left Lower Extremities DVT Study Measurements Left 2D Measurements +------------------------------------+----------+---------------+----------+ ! Location                            ! Visualized! Compressibility! Thrombosis! +------------------------------------+----------+---------------+----------+ ! Common Femoral                      !Yes       ! Yes            ! None      ! +------------------------------------+----------+---------------+----------+ ! Prox Femoral                        !Yes       ! Yes            ! None      ! +------------------------------------+----------+---------------+----------+ ! Mid Femoral                         !Yes       ! Yes            ! None      ! +------------------------------------+----------+---------------+----------+ ! Dist Femoral                        !Yes       ! Yes            ! None      ! +------------------------------------+----------+---------------+----------+ ! Popliteal                           !Yes       ! Yes            ! None      ! +------------------------------------+----------+---------------+----------+ ! Sapheno Femoral Junction            ! Yes       ! Yes            ! None      ! +------------------------------------+----------+---------------+----------+ ! PTV                                 ! No        !               !          ! +------------------------------------+----------+---------------+----------+ ! Peroneal                            !No        !               !          ! +------------------------------------+----------+---------------+----------+ ! Gastroc                             ! Yes       ! Yes            ! None      ! +------------------------------------+----------+---------------+----------+ ! GSV Thigh                           ! Yes       ! Yes            ! None      ! +------------------------------------+----------+---------------+----------+ ! GSV Knee                            ! Yes       ! Yes            ! None      ! +------------------------------------+----------+---------------+----------+ ! GSV Ankle                           ! Yes       ! Yes            ! None      ! +------------------------------------+----------+---------------+----------+ ! SSV                                 ! Yes       ! Yes            ! None      ! +------------------------------------+----------+---------------+----------+ Left Doppler Measurements +---------------------------+------+------+--------------------------------+ ! Location                   ! Signal!Reflux! Reflux (msec)                   ! +---------------------------+------+------+--------------------------------+ ! Common Femoral             !Phasic!      ! ! +---------------------------+------+------+--------------------------------+ ! Prox Femoral               !Phasic!      !                                ! +---------------------------+------+------+--------------------------------+ ! Popliteal                  !Phasic!      !                                ! +---------------------------+------+------+--------------------------------+            On admission         Review of Systems:     As recorded in HPI          Physical Examination:        Vitals:    07/03/22 2330 07/04/22 0345 07/04/22 0645 07/04/22 0814   BP: 111/65  102/62    Pulse: 81  81 81   Resp: 18  18 18   Temp: 98.6 °F (37 °C)  97.8 °F (36.6 °C)    TempSrc:   Oral    SpO2: 95%  95% 95%   Weight:  222 lb 10.6 oz (101 kg)     Height:           Recent Labs     07/02/22  1132 07/02/22  1630 07/03/22  2042 07/04/22  0639   POCGLU 131* 130* 150* 120*       Intake/Output Summary (Last 24 hours) at 7/4/2022 0959  Last data filed at 7/4/2022 0810  Gross per 24 hour   Intake 1510 ml   Output 1200 ml   Net 310 ml       General Appearance:  alert, well appearing, and in no acute distress, central obesity present  Mental status:   Head:  normocephalic, atraumatic. Eye: no icterus, redness, pupils equal and reactive, extraocular eye movements intact, conjunctiva clear  Ear: normal external ear, no discharge, hearing intact  Nose:  no drainage noted  Mouth: mucous membranes moist  Neck: supple, no carotid bruits, thyroid not palpable  Lungs: Bilateral equal air entry, clear to ausculation, no wheezing, rales or rhonchi, normal effort  Cardiovascular: normal rate, regular rhythm, no murmur, gallop, rub.   Abdomen: Soft, tenderness in right upper quadrant, epigastric area, liver palpable under right rib cage  Neurologic: There are no new focal motor or sensory deficits,   Skin: No gross lesions, rashes, bruising or bleeding on exposed skin area  Extremities:  peripheral pulses palpable, no pedal edema or calf pain with palpation  Psych:             Data:     PLabs:    BMP:   Recent Labs     07/03/22  0603 07/04/22  0709   * 135   K 3.9 3.8   CO2 26 28   BUN 9 8   CREATININE 0.71 0.50   LABGLOM >60 >60   GLUCOSE 113* 121*                 Medications: Allergies: Allergies   Allergen Reactions    Chantix [Varenicline Tartrate]      Nightmares     Sulphadimidine [Sulfamethazine]        Current Meds:   Scheduled Meds:    gabapentin  300 mg Oral TID    [Held by provider] lisinopril  5 mg Oral Daily    oxyCODONE  10 mg Oral 2 times per day    apixaban  10 mg Oral BID    Followed by   Darryle Norris ON 7/7/2022] apixaban  5 mg Oral BID    clopidogrel  75 mg Oral Daily    sodium chloride flush  5-40 mL IntraVENous 2 times per day    ARIPiprazole  5 mg Oral Daily    atorvastatin  80 mg Oral Daily    [Held by provider] metoprolol succinate  50 mg Oral Daily    budesonide-formoterol  2 puff Inhalation BID    fluticasone  2 spray Nasal Daily    therapeutic multivitamin-minerals  1 tablet Oral Daily    pantoprazole  40 mg Oral QAM AC    oxybutynin  5 mg Oral BID    ranolazine  500 mg Oral BID    tiotropium  2 puff Inhalation Daily    venlafaxine  150 mg Oral Daily    venlafaxine  75 mg Oral Daily     Continuous Infusions:    sodium chloride       PRN Meds: docusate sodium, ALPRAZolam, melatonin, HYDROmorphone, sodium chloride flush, sodium chloride, magnesium hydroxide, acetaminophen **OR** acetaminophen, albuterol sulfate HFA, oxyCODONE, traZODone, potassium chloride **OR** potassium alternative oral replacement **OR** potassium chloride, perflutren lipid microspheres, sodium chloride flush          Assessment:        Primary Problem  Pulmonary embolus (HCC)    Principal Problem:    Pulmonary embolus (HCC)  Active Problems:    Liver mass    Diabetes mellitus type 2 in obese ([facility]    Morbid obesity ([facility] [address]  Resolved Problems:    * No resolved hospital problems.  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 Evelyn Patel yesterday  Patient, will have liver biopsy today, likely start on Eliquis tomorrow  Will be on Plavix and Eliquis long-term, will avoid triple therapy  Patient will need placement    6/30   Patient, clinically doing better  Started patient on Plavix, changing heparin drip to Eliquis  Patient need to be on Eliquis and Plavix for rest of her life  Will discontinue aspirin    To patient length  Patient will be getting discharge to SNF, awaiting placement  Overall prognosis guarded  No bleeding from the local site  7/2  Patient, had episode of chest pain  Evaluated by cardiologist  Chest pain determined to be atypical, constant in nature  DC planning, awaiting placement    7/3   Patient has episode of hypotension  Although patient is completely asymptomatic  Holding lisinopril and Lopressor  Chest pain is improved  Biopsy report pending  Awaiting placement  We will recheck blood pressure    7/4  No new complaints  Blood pressure controlled  Biopsy awaited  Will keep lisinopril and Lopressor on hold  DC plan   . Hospital Problems           Last Modified POA    * (Principal) Pulmonary embolus ([address] 6/28/2022 Yes    Liver mass 6/28/2022 Yes    Diabetes mellitus type 2 in obese [facility]) 6/28/2022 Yes    Morbid obesity ([address] 6/28/2022 Yes                         Thanks for consulting us . Will monitor vitals and clinical course , and  Optimize therapy  as needed .            Natasha Wild MD  7/4/2022

## 2022-07-04 NOTE — PLAN OF CARE
Problem: Discharge Planning  Goal: Discharge to home or other facility with appropriate resources  7/4/2022 1536 by Yanni Valle RN  Outcome: Progressing     Problem: Pain  Goal: Verbalizes/displays adequate comfort level or baseline comfort level  7/4/2022 1536 by Yanni Valle RN  Outcome: Progressing     Problem: Safety - Adult  Goal: Free from fall injury  7/4/2022 1536 by Yanni Valle RN  Outcome: Progressing     Problem: ABCDS Injury Assessment  Goal: Absence of physical injury  7/4/2022 1536 by Yanni Valle RN  Outcome: Progressing

## 2022-07-04 NOTE — PLAN OF CARE
Problem: Discharge Planning  Goal: Discharge to home or other facility with appropriate resources  7/4/2022 0338 by Taylor Mares RN  Outcome: Progressing  7/3/2022 1818 by Caridad Kim RN  Outcome: Progressing     Problem: Pain  Goal: Verbalizes/displays adequate comfort level or baseline comfort level  7/4/2022 0338 by Taylor Mares RN  Outcome: Progressing  Note: Patient medicated with oxycodone for pain. Assisted in positioning comfortably. Rested quietly after oxycodone given. 7/3/2022 1818 by Caridad Kim RN  Outcome: Progressing     Problem: Safety - Adult  Goal: Free from fall injury  7/4/2022 0338 by Taylor Mares RN  Outcome: Progressing  Note: Patient alert and oriented but weak. Bed alarm on.  7/3/2022 1818 by Caridad Kim RN  Outcome: Progressing     Problem: ABCDS Injury Assessment  Goal: Absence of physical injury  7/4/2022 0338 by Taylor Mares RN  Outcome: Progressing  7/3/2022 1818 by Caridad Kim RN  Outcome: Progressing     Problem: Chronic Conditions and Co-morbidities  Goal: Patient's chronic conditions and co-morbidity symptoms are monitored and maintained or improved  7/4/2022 0338 by Taylor Mares RN  Outcome: Progressing  7/3/2022 1818 by Caridad Kim RN  Outcome: Progressing     Problem: Skin/Tissue Integrity  Goal: Absence of new skin breakdown  Description: 1. Monitor for areas of redness and/or skin breakdown  2. Assess vascular access sites hourly  3. Every 4-6 hours minimum:  Change oxygen saturation probe site  4. Every 4-6 hours:  If on nasal continuous positive airway pressure, respiratory therapy assess nares and determine need for appliance change or resting period. Outcome: Progressing  Note: Buttocks reddened. Pressure relief mattress continues. Zinc cream to buttocks.

## 2022-07-04 NOTE — PROGRESS NOTES
Training  Gait Training: (P) No (Pt too tired - wanted to transfer to chair only)     PT Exercises  Exercise Treatment: (P) Patient states will complete her HEP once she has a BM.    Static Sitting Balance Exercises: (P) Giselle THOMAS x8'             Goals  Short Term Goals  Time Frame for Short term goals: 1 week  Short term goal 1: pt will perform bed mobility independently to improve function  Short term goal 2: pt will perform sit<>stand transfer with SBA and no device  Short term goal 3: pt will ambulate 150' with SBA to increase endurance  Short term goal 4: pt will perform 10 step ups onto a 6\" platform with handheld assist to safely return home    Education  Patient Education  Education Given To: (P) Patient  Education Provided: (P) Role of Therapy;Plan of Care;Home Exercise Program  Education Method: (P) Verbal  Barriers to Learning: (P) None  Education Outcome: (P) Verbalized understanding    Therapy Time   Individual Concurrent Group Co-treatment   Time In (P) 1328         Time Out (P) 1345         Minutes (P) 17                 Yousif Lower, PTA

## 2022-07-05 VITALS
RESPIRATION RATE: 16 BRPM | WEIGHT: 220.46 LBS | DIASTOLIC BLOOD PRESSURE: 69 MMHG | BODY MASS INDEX: 43.28 KG/M2 | OXYGEN SATURATION: 94 % | HEART RATE: 77 BPM | SYSTOLIC BLOOD PRESSURE: 123 MMHG | TEMPERATURE: 98.6 F | HEIGHT: 60 IN

## 2022-07-05 LAB
ANION GAP SERPL CALCULATED.3IONS-SCNC: 10 MMOL/L (ref 9–17)
BUN BLDV-MCNC: 7 MG/DL (ref 8–23)
CALCIUM SERPL-MCNC: 8.9 MG/DL (ref 8.6–10.4)
CHLORIDE BLD-SCNC: 94 MMOL/L (ref 98–107)
CO2: 30 MMOL/L (ref 20–31)
CREAT SERPL-MCNC: 0.58 MG/DL (ref 0.5–0.9)
GFR AFRICAN AMERICAN: >60 ML/MIN
GFR NON-AFRICAN AMERICAN: >60 ML/MIN
GFR SERPL CREATININE-BSD FRML MDRD: ABNORMAL ML/MIN/{1.73_M2}
GLUCOSE BLD-MCNC: 110 MG/DL (ref 70–99)
GLUCOSE BLD-MCNC: 139 MG/DL (ref 65–105)
HCT VFR BLD CALC: 30.6 % (ref 36–46)
HEMOGLOBIN: 10 G/DL (ref 12–16)
MCH RBC QN AUTO: 28.7 PG (ref 26–34)
MCHC RBC AUTO-ENTMCNC: 32.5 G/DL (ref 31–37)
MCV RBC AUTO: 88.2 FL (ref 80–100)
PDW BLD-RTO: 14.2 % (ref 11.5–14.9)
PLATELET # BLD: 527 K/UL (ref 150–450)
PMV BLD AUTO: 7.3 FL (ref 6–12)
POTASSIUM SERPL-SCNC: 3.9 MMOL/L (ref 3.7–5.3)
RBC # BLD: 3.47 M/UL (ref 4–5.2)
SODIUM BLD-SCNC: 134 MMOL/L (ref 135–144)
WBC # BLD: 11.8 K/UL (ref 3.5–11)

## 2022-07-05 PROCEDURE — 97535 SELF CARE MNGMENT TRAINING: CPT

## 2022-07-05 PROCEDURE — 97116 GAIT TRAINING THERAPY: CPT

## 2022-07-05 PROCEDURE — 6370000000 HC RX 637 (ALT 250 FOR IP): Performed by: NURSE PRACTITIONER

## 2022-07-05 PROCEDURE — 6370000000 HC RX 637 (ALT 250 FOR IP): Performed by: INTERNAL MEDICINE

## 2022-07-05 PROCEDURE — 80048 BASIC METABOLIC PNL TOTAL CA: CPT

## 2022-07-05 PROCEDURE — 82947 ASSAY GLUCOSE BLOOD QUANT: CPT

## 2022-07-05 PROCEDURE — 94640 AIRWAY INHALATION TREATMENT: CPT

## 2022-07-05 PROCEDURE — 85027 COMPLETE CBC AUTOMATED: CPT

## 2022-07-05 PROCEDURE — 99232 SBSQ HOSP IP/OBS MODERATE 35: CPT | Performed by: INTERNAL MEDICINE

## 2022-07-05 PROCEDURE — 97110 THERAPEUTIC EXERCISES: CPT

## 2022-07-05 PROCEDURE — 36415 COLL VENOUS BLD VENIPUNCTURE: CPT

## 2022-07-05 PROCEDURE — 2580000003 HC RX 258: Performed by: NURSE PRACTITIONER

## 2022-07-05 PROCEDURE — 99239 HOSP IP/OBS DSCHRG MGMT >30: CPT | Performed by: INTERNAL MEDICINE

## 2022-07-05 RX ORDER — DOCUSATE SODIUM 100 MG/1
100 CAPSULE, LIQUID FILLED ORAL 2 TIMES DAILY PRN
DISCHARGE
Start: 2022-07-05

## 2022-07-05 RX ORDER — GABAPENTIN 300 MG/1
300 CAPSULE ORAL 3 TIMES DAILY
Qty: 15 CAPSULE | Refills: 3 | Status: SHIPPED | OUTPATIENT
Start: 2022-07-05 | End: 2022-07-10

## 2022-07-05 RX ADMIN — OXYCODONE HYDROCHLORIDE 5 MG: 5 TABLET ORAL at 05:24

## 2022-07-05 RX ADMIN — PANTOPRAZOLE SODIUM 40 MG: 40 TABLET, DELAYED RELEASE ORAL at 05:24

## 2022-07-05 RX ADMIN — ARIPIPRAZOLE 5 MG: 5 TABLET ORAL at 13:54

## 2022-07-05 RX ADMIN — ATORVASTATIN CALCIUM 80 MG: 80 TABLET, FILM COATED ORAL at 09:03

## 2022-07-05 RX ADMIN — VENLAFAXINE HYDROCHLORIDE 75 MG: 75 CAPSULE, EXTENDED RELEASE ORAL at 09:05

## 2022-07-05 RX ADMIN — OXYBUTYNIN CHLORIDE 5 MG: 5 TABLET ORAL at 09:02

## 2022-07-05 RX ADMIN — APIXABAN 10 MG: 5 TABLET, FILM COATED ORAL at 09:01

## 2022-07-05 RX ADMIN — BUDESONIDE AND FORMOTEROL FUMARATE DIHYDRATE 2 PUFF: 160; 4.5 AEROSOL RESPIRATORY (INHALATION) at 08:27

## 2022-07-05 RX ADMIN — OXYCODONE HYDROCHLORIDE 10 MG: 10 TABLET, FILM COATED, EXTENDED RELEASE ORAL at 09:29

## 2022-07-05 RX ADMIN — SODIUM CHLORIDE, PRESERVATIVE FREE 10 ML: 5 INJECTION INTRAVENOUS at 09:05

## 2022-07-05 RX ADMIN — GABAPENTIN 300 MG: 600 TABLET, FILM COATED ORAL at 13:54

## 2022-07-05 RX ADMIN — TIOTROPIUM BROMIDE INHALATION SPRAY 2 PUFF: 3.12 SPRAY, METERED RESPIRATORY (INHALATION) at 08:27

## 2022-07-05 RX ADMIN — MULTIPLE VITAMINS W/ MINERALS TAB 1 TABLET: TAB at 09:03

## 2022-07-05 RX ADMIN — GABAPENTIN 300 MG: 600 TABLET, FILM COATED ORAL at 09:03

## 2022-07-05 RX ADMIN — RANOLAZINE 500 MG: 500 TABLET, EXTENDED RELEASE ORAL at 09:02

## 2022-07-05 RX ADMIN — VENLAFAXINE HYDROCHLORIDE 150 MG: 150 CAPSULE, EXTENDED RELEASE ORAL at 09:05

## 2022-07-05 RX ADMIN — CLOPIDOGREL BISULFATE 75 MG: 75 TABLET ORAL at 09:02

## 2022-07-05 ASSESSMENT — PAIN DESCRIPTION - LOCATION
LOCATION: ABDOMEN

## 2022-07-05 ASSESSMENT — PAIN DESCRIPTION - DESCRIPTORS: DESCRIPTORS: DISCOMFORT;NAGGING;POUNDING

## 2022-07-05 ASSESSMENT — PAIN - FUNCTIONAL ASSESSMENT: PAIN_FUNCTIONAL_ASSESSMENT: PREVENTS OR INTERFERES SOME ACTIVE ACTIVITIES AND ADLS

## 2022-07-05 ASSESSMENT — PAIN DESCRIPTION - ORIENTATION: ORIENTATION: RIGHT;LEFT;MID

## 2022-07-05 ASSESSMENT — PAIN SCALES - GENERAL
PAINLEVEL_OUTOF10: 8

## 2022-07-05 NOTE — PROGRESS NOTES
Today's Date: 7/5/2022  Patient Name: Maia Love  Date of admission: 6/27/2022  6:49 PM  Patient's age: 81 y.o., 1950  Admission Dx: Pulmonary embolism on right [facility]) [I26.99]    Reason for Consult: PE  Requesting Physician: Gerrianne Cranker, MD    CHIEF COMPLAINT:    Chief Complaint   Patient presents with    Fatigue    Abdominal Pain     SUBJECTIVE:  .patient was seen and examined. No events overnight. Pain is controlled on current treatment. No fever. No hemoptysis. No other complaints. BRIEF CASE HISTORY:    Maia Love is a 81 y.o. female who is admitted to the hospital on 6/27/2022  for fatigue and abd pain. Patient was recently admitted to hospital about a week ago and at that time she had a CT scan of the abdomen which showed multiple liver lesions suspicious for metastasis. She was evaluated by oncology. Her tumor markers showed very high CA 19-9 and CEA and AFP. 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 IR guided biopsy was ordered but because patient was on Plavix plan was to do biopsy as outpatient. CT chest abdomen pelvis and CT chest showed small pulmonary emboli in the right upper and right middle lobes with very small clot burden. Oncology consulted for further evaluation. Past Medical History:   has a past medical history of Acid reflux, Arthritis, Cataracts, bilateral, CHF (congestive heart failure) ([address] COPD (chronic obstructive pulmonary disease) ([address] Gall stones, Head injury, Hyperlipidemia, Hypertension, Insomnia, MI (myocardial infarction) ([address] Neuropathy, Sleep apnea, and Type 2 diabetes mellitus without complication ([address] Past Surgical History:   has a past surgical history that includes Femur Surgery; Foot surgery (Left); Mandible fracture surgery; Cardiac surgery (1999); Ankle surgery (Left); Tonsillectomy; Coronary angioplasty with stent (1999); and IR BIOPSY LIVER PERCUTANEOUS (6/29/2022).      Medications:    Prior to Admission medications    Medication Sig Start Date End Date Taking? Authorizing Provider   docusate sodium (COLACE) 100 MG capsule Take 1 capsule by mouth 2 times daily as needed for Constipation 7/5/22  Yes Vladimir Baron MD   gabapentin (NEURONTIN) 300 MG capsule Take 1 capsule by mouth 3 times daily for 5 days. 7/5/22 7/10/22 Yes Vladimir Baron MD   oxyCODONE (OXYCONTIN) 10 MG extended release tablet Take 1 tablet by mouth every 12 hours for 5 days. 7/1/22 7/6/22 Yes Hazel Chiu MD   ALPRAZolam [facility]) 0.5 MG tablet Take 1 tablet by mouth 3 times daily as needed for Anxiety for up to 5 days. 7/1/22 7/6/22 Yes Hazel Chiu MD   oxyCODONE HCl 5 MG TABA Take 5 mg by mouth 4 times daily as needed (moderate to severe pain) for up to 5 days.  6/30/22 7/5/22 Yes Noelle Aviles MD   apixaban (ELIQUIS) 5 MG TABS tablet Take 2 tablets by mouth 2 times daily for 14 doses 6/30/22 7/7/22 Yes Noelle Aviles MD   apixaban (ELIQUIS) 5 MG TABS tablet Take 1 tablet by mouth 2 times daily 7/8/22 8/7/22 Yes Noelle Aviles MD   clopidogrel (PLAVIX) 75 MG tablet Take 1 tablet by mouth daily 6/30/22  Yes Noelle Aviles MD   glucose monitoring (FREESTYLE FREEDOM) kit 1 kit by Does not apply route daily as needed (diabetic) 6/24/22   Vladimir Baron MD   FreeStyle Lancets MISC 1 each by Does not apply route daily 6/24/22   Vladimir Baron MD   Blood Glucose Monitoring Suppl (FREESTYLE LITE) STACY 1 Device by Does not apply route daily as needed (diabetic) 6/24/22   Vladimir Baron MD   Blood Glucose Monitoring Suppl (FREESTYLE FREEDOM LITE) w/Device KIT 1 kit by Does not apply route daily 6/24/22   Vladimir Baron MD   Insulin Syringe-Needle U-100 30G X 5/16\" 0.5 ML MISC 1 each by Does not apply route daily 6/24/22   Vladimir Baron MD   albuterol sulfate  (90 Base) MCG/ACT inhaler Inhale 2 puffs into the lungs every 6 hours as needed for Wheezing or Shortness of Breath 5/26/21   Herminia Vasquez, APRN - JOEL   ranolazine (RANEXA) 500 MG extended release tablet Take 1 tablet by mouth 2 times daily 3/12/21   Berea July, APRN - [facility]   venlafaxine (EFFEXOR XR) 150 MG extended release capsule Take 1 capsule by mouth daily Take with 75 mg capsule 2/26/21   Berea July, APRN - CNP   ARIPiprazole (ABILIFY) 5 MG tablet Take 1 tablet by mouth daily 2/26/21   Berea July, APRN - CNP   budesonide-formoterol [facility]) 160-4.5 MCG/ACT AERO Inhale 2 puffs into the lungs 2 times daily 2/26/21   Berea July, APRN - CNP   tiotropium (SPIRIVA RESPIMAT) 2.5 MCG/ACT AERS inhaler Inhale 2 puffs into the lungs daily 2/26/21   Berea July, APRN - [facility]   traZODone (DESYREL) 50 MG tablet take 1 tablet by mouth at bedtime if needed for sleep 12/30/20   Historical Provider, MD   oxybutynin (DITROPAN) 5 MG tablet take 1 tablet by mouth twice a day 2/26/21   Berea July, APRN - [facility]   glucose monitoring kit (FREESTYLE) monitoring kit 1 kit by Does not apply route daily 10/3/20   Berea July, APRN - [facility]   blood glucose monitor strips Test 3 times a day & as needed for symptoms of irregular blood glucose. Dispense sufficient amount for indicated testing frequency plus additional to accommodate PRN testing needs.  10/3/20   Berea July, APRN - CNP   Lancets MISC 1 each by Does not apply route 3 times daily 10/3/20   Berea July, APRN - CNP   atorvastatin (LIPITOR) 80 MG tablet Take 1 tablet by mouth daily 9/30/20   Berea July, APRN - CNP   bisoprolol (ZEBETA) 5 MG tablet take 1 tablet by mouth once daily 9/30/20   Berea July, APRN - CNP   omeprazole (PRILOSEC) 40 MG delayed release capsule take 1 capsule by mouth every morning BEFORE BREAKFAST 9/30/20   Berea July, APRN - CNP   venlafaxine (EFFEXOR XR) 75 MG extended release capsule Take 1 capsule by mouth daily 9/30/20   Berea July, APRN - CNP   glimepiride (AMARYL) 2 MG tablet take 1 tablet by mouth twice a day before meals 9/30/20   Berea July, APRN - CNP Semaglutide, 1 MG/DOSE, (OZEMPIC, 1 MG/DOSE,) 2 MG/1.5ML SOPN Inject 1 mg per week 9/30/20   BENNETT Reid CNP   Multiple Vitamins-Minerals (THERAPEUTIC MULTIVITAMIN-MINERALS) tablet Take 1 tablet by mouth daily 9/30/20   BENNETT Reid CNP   Melatonin 5 MG SUBL Place 1 applicator under the tongue nightly as needed (insomnia) 9/30/20   BENNETT Reid CNP   Insulin Pen Needle 32G X 4 MM MISC 1 each by Does not apply route once a week 3/12/20   Rajesh Moscoso MD   fluticasone Benjamin Paling) 50 MCG/ACT nasal spray 2 sprays by Nasal route daily 9/13/19   Rajesh Moscoso MD     Current Facility-Administered Medications   Medication Dose Route Frequency Provider Last Rate Last Admin    calcium carbonate (TUMS) chewable tablet 500 mg  500 mg Oral TID PRN BENNETT Ramos CNP        gabapentin (NEURONTIN) tablet 300 mg  300 mg Oral TID Lord Maria De Jesus MD   300 mg at 07/05/22 0903    docusate sodium (COLACE) capsule 100 mg  100 mg Oral BID PRN Lord Maria De Jesus MD   100 mg at 07/03/22 0935    [Held by provider] lisinopril (PRINIVIL;ZESTRIL) tablet 5 mg  5 mg Oral Daily Brittani Onofre MD        oxyCODONE (OXYCONTIN) extended release tablet 10 mg  10 mg Oral 2 times per day Vishnu Kulkarni MD   10 mg at 07/04/22 2018    ALPRAZolam (XANAX) tablet 0.5 mg  0.5 mg Oral TID PRN Vishnu Kulkarni MD   0.5 mg at 07/02/22 2113    apixaban (ELIQUIS) tablet 10 mg  10 mg Oral BID Lord Maria De Jesus MD   10 mg at 07/05/22 0901    Followed by   Jacklyn Flores ON 7/7/2022] apixaban (ELIQUIS) tablet 5 mg  5 mg Oral BID Lord Maria De Jesus MD        clopidogrel (PLAVIX) tablet 75 mg  75 mg Oral Daily Lord Maria De Jesus MD   75 mg at 07/05/22 0902    melatonin tablet 6 mg  6 mg Oral Nightly PRN BENNETT Martinez CNP   6 mg at 06/30/22 2316    HYDROmorphone (DILAUDID) injection 0.5 mg  0.5 mg IntraVENous Q4H PRN BENNETT Martinez - CNP   0.5 mg at 07/02/22 2248    sodium chloride flush 0.9 % injection 5-40 mL  5-40 mL IntraVENous 2 times per day BENNETT Torre CNP   10 mL at 07/05/22 0905    sodium chloride flush 0.9 % injection 10 mL  10 mL IntraVENous PRN BENNETT Torre CNP   10 mL at 07/02/22 2248    0.9 % sodium chloride infusion   IntraVENous PRN BENNETT Torre CNP        magnesium hydroxide (MILK OF MAGNESIA) 400 MG/5ML suspension 30 mL  30 mL Oral Daily PRN BENNETT Torre CNP        acetaminophen (TYLENOL) tablet 650 mg  650 mg Oral Q6H PRN BENNETT Torre CNP        Or    acetaminophen (TYLENOL) suppository 650 mg  650 mg Rectal Q6H PRN BENNETT Torre CNP        albuterol sulfate HFA (PROVENTIL;VENTOLIN;PROAIR) 108 (90 Base) MCG/ACT inhaler 2 puff  2 puff Inhalation Q6H PRN BENNETT Torre CNP   2 puff at 07/01/22 1935    ARIPiprazole (ABILIFY) tablet 5 mg  5 mg Oral Daily BENNETT Torre CNP   5 mg at 07/04/22 0815    atorvastatin (LIPITOR) tablet 80 mg  80 mg Oral Daily BENNETT Torre CNP   80 mg at 07/05/22 2736    [Held by provider] metoprolol succinate (TOPROL XL) extended release tablet 50 mg  50 mg Oral Daily BENNETT Torre CNP   50 mg at 07/02/22 9175    budesonide-formoterol (SYMBICORT) 160-4.5 MCG/ACT inhaler 2 puff  2 puff Inhalation BID BENNETT Torre CNP   2 puff at 07/05/22 0827    fluticasone (FLONASE) 50 MCG/ACT nasal spray 2 spray  2 spray Nasal Daily BENNETT Torre CNP   2 spray at 07/01/22 1051    therapeutic multivitamin-minerals 1 tablet  1 tablet Oral Daily BENNETT Torre CNP   1 tablet at 07/05/22 9436    pantoprazole (PROTONIX) tablet 40 mg  40 mg Oral QAM AC BENNETT Torre CNP   40 mg at 07/05/22 0524    oxybutynin (DITROPAN) tablet 5 mg  5 mg Oral BID BENNETT Torre CNP   5 mg at 07/05/22 0902    oxyCODONE (ROXICODONE) immediate release tablet 5 mg  5 mg Oral 4x Daily PRN BENNETT Torre CNP   5 mg at 07/05/22 0524    ranolazine (RANEXA) extended release tablet 500 mg  500 mg Oral BID Solis Collins Cipriano Lau, APRN - CNP   500 mg at 07/05/22 0902    tiotropium (SPIRIVA RESPIMAT) 2.5 MCG/ACT inhaler 2 puff  2 puff Inhalation Daily Irl Jarrod, APRN - CNP   2 puff at 07/05/22 0827    traZODone (DESYREL) tablet 50 mg  50 mg Oral Nightly PRN Irl Fried, APRN - CNP        venlafaxine (EFFEXOR XR) extended release capsule 150 mg  150 mg Oral Daily Irl Fried, APRN - CNP   150 mg at 07/05/22 0905    venlafaxine (EFFEXOR XR) extended release capsule 75 mg  75 mg Oral Daily Irl Fried, APRN - CNP   75 mg at 07/05/22 0568    potassium chloride (KLOR-CON M) extended release tablet 40 mEq  40 mEq Oral PRN Kinjal Mccurdy MD   40 mEq at 06/28/22 1436    Or    potassium bicarb-citric acid (EFFER-K) effervescent tablet 40 mEq  40 mEq Oral PRN Kinjal Mccurdy MD        Or   [employer] potassium chloride 10 mEq/100 mL IVPB (Peripheral Line)  10 mEq IntraVENous PRN Kinjal Mccurdy MD        perflutren lipid microspheres (DEFINITY) injection 1.65 mg  1.5 mL IntraVENous ONCE PRN Ro Flores MD        sodium chloride flush 0.9 % injection 10 mL  10 mL IntraVENous PRN Elfi Souza DO   10 mL at 06/27/22 2118       Allergies:  Chantix [varenicline tartrate] and Sulphadimidine [sulfamethazine]    Social History:   reports that she quit smoking about 22 years ago. Her smoking use included cigarettes. She has a 40.00 pack-year smoking history. She has never used smokeless tobacco. She reports current alcohol use of about 1.0 standard drink of alcohol per week. She reports current drug use. Drug: Marijuana Charmayne Stai). Family History: family history includes Other in her father; Stroke in her mother. REVIEW OF SYSTEMS:    Constitutional: No fever or chills.  No night sweats, no weight loss   Eyes: No eye discharge, double vision, or eye pain   HEENT: negative for sore mouth, sore throat, hoarseness and voice change   Respiratory: negative for cough , sputum, dyspnea, wheezing, hemoptysis, chest pain   Cardiovascular: negative for chest pain, dyspnea, palpitations, orthopnea, PND   Gastrointestinal: negative for nausea, vomiting, diarrhea, constipation, abdominal pain, Dysphagia, hematemesis and hematochezia   Genitourinary: negative for frequency, dysuria, nocturia, urinary incontinence, and hematuria   Integument: negative for rash, skin lesions, bruises.    Hematologic/Lymphatic: negative for easy bruising, bleeding, lymphadenopathy, or petechiae   Endocrine: negative for heat or cold intolerance,weight changes, change in bowel habits and hair loss   Musculoskeletal: negative for myalgias, arthralgias, pain, joint swelling,and bone pain   Neurological: negative for headaches, dizziness, seizures, weakness, numbness    PHYSICAL EXAM:      /69   Pulse 77   Temp 98.6 °F (37 °C) (Oral)   Resp 18   Ht 5' (1.524 m)   Wt 220 lb 7.4 oz (100 kg)   SpO2 94%   BMI 43.06 kg/m²    Temp (24hrs), Av.3 °F (36.8 °C), Min:98 °F (36.7 °C), Max:98.6 °F (37 °C)    General appearance - well appearing, no in pain or distress   Mental status - alert and cooperative   Eyes - pupils equal and reactive, extraocular eye movements intact   Ears - bilateral TM's and external ear canals normal   Mouth - mucous membranes moist, pharynx normal without lesions   Neck - supple, no significant adenopathy   Lymphatics - no palpable lymphadenopathy, no hepatosplenomegaly   Chest - clear to auscultation, no wheezes, rales or rhonchi, symmetric air entry   Heart - normal rate, regular rhythm, normal S1, S2, no murmurs  Abdomen - soft, nontender, nondistended, no masses or organomegaly   Neurological - alert, oriented, normal speech, no focal findings or movement disorder noted   Musculoskeletal - no joint tenderness, deformity or swelling   Extremities - peripheral pulses normal, no pedal edema, no clubbing or cyanosis   Skin - normal coloration and turgor, no rashes, no suspicious skin lesions noted ,    DATA:    Labs:   CBC:   Recent Labs     22  0603 07/05/22  0531   WBC 13.1* 11.8*   HGB 10.2* 10.0*   HCT 31.7* 30.6*   * 527*     BMP:   Recent Labs     07/04/22  0709 07/05/22  0531    134*   K 3.8 3.9   CO2 28 30   BUN 8 7*   CREATININE 0.50 0.58   LABGLOM >60 >60   GLUCOSE 121* 110*     PT/INR:   No results for input(s): PROTIME, INR in the last 72 hours. IMAGING DATA:  IR BIOPSY LIVER PERCUTANEOUS   Final Result   Successful liver biopsy under ultrasound guidance, as above. VL DUP LOWER EXTREMITY VENOUS BILATERAL   Final Result      CT ABDOMEN PELVIS W IV CONTRAST Additional Contrast? None   Final Result      CT CHEST PULMONARY EMBOLISM W CONTRAST   Final Result      XR CHEST PORTABLE   Final Result   Mild bibasal fibrosis and small right pleural effusion. Primary Problem  Pulmonary embolus [facility])    Active Hospital Problems    Diagnosis Date Noted    Pulmonary embolus ([facility] [I26.99] 06/27/2022     Priority: Medium    Liver mass [R16.0]      Priority: Medium    Diabetes mellitus type 2 in obese ([facility] [E11.69, E66.9] 02/18/2019    Morbid obesity ([facility] [E66.01] 02/18/2019         IMPRESSION:   1. Metastatic disease with multiple liver lesion possibly pancreatobiliary origin,  2. Acute PE  3. Peritoneal carcinomatosis    RECOMMENDATIONS:  1. I reviewed the laboratory data, imaging studies, prior records, discussed diagnosis, prognosis and treatment recommendations  2. Continue heparin drip  3. Liver biopsy done 6/29. Pathology pending. 4. Continue other management as per primary team  5. Patient can be transition to NOAC at discharge with Eliquis  6.  Patient to follow up as OTP for bx results(expect to leave today)                                    [facility]                            This note is created with the assistance of a speech recognition program.  While intending to generate a document that actually reflects the content of the visit, the document can

## 2022-07-05 NOTE — PROGRESS NOTES
PALLIATIVE CARE NURSING ASSESSMENT    Patient: Katie Beverly  Room: 2059/2059-01    Reason For Consult   Goals of care evaluation  Distress management  Guidance and support  Facilitate communications  Assistance in coordinating care    Code Status:  Full    Summary  Met with patient, support and information given. Validated feelings and concerns. Pt has decided on making her decision maker her son. Offered to help her fill out. She has paperwork and declines doing it now. Reviewed palliative care is available moving forward. Offered her to see providers at a clinic or they can come to her home. Offered to set up referral.  Pt is interested in waiting. Pt is still awaiting biopsy results for primary and to talk treatment options. Pt relates pain is better controlled. Pt is being discharged to [facility] today. Impression: Katie Beverly is a 81y.o. year old female  has a past medical history of Acid reflux, Arthritis, Cataracts, bilateral, CHF (congestive heart failure) ([address] COPD (chronic obstructive pulmonary disease) ([address] Gall stones, Head injury, Hyperlipidemia, Hypertension, Insomnia, MI (myocardial infarction) ([address] Neuropathy, Sleep apnea, and Type 2 diabetes mellitus without complication ([address] .  Currently hospitalized for the management of Pulmonary Embolism. The Palliative Care Team is following to assist with patient support, goals of care, decision making. Vital Signs  Blood pressure 123/69, pulse 77, temperature 98.6 °F (37 °C), temperature source Oral, resp. rate 16, height 5' (1.524 m), weight 220 lb 7.4 oz (100 kg), SpO2 94 %.     Patient Active Problem List   Diagnosis    Essential hypertension    Diabetes mellitus type 2 in obese ([address]    Hx of congestive heart failure    COPD mixed type ([address]    Morbid obesity ([address]    Chronic midline low back pain with bilateral sciatica    Diabetic peripheral neuropathy ([address]    Dyslipidemia    Coronary artery disease involving native coronary artery of native heart without angina pectoris    SAGAR (obstructive sleep apnea)    Elevated WBC count    Insomnia    Anxiety and depression    Hematuria    Recurrent major depressive disorder, in partial remission (HCC)    Urge incontinence of urine    Hypertriglyceridemia    Diarrhea    Hypokalemia    Choledocholithiasis    Liver mass    Anemia    Weight loss    Generalized abdominal pain    Nausea and vomiting    Thrombocytosis    Pulmonary embolus (HCC)       Palliative Interaction: Updated myself on events over the weekend. Updates received from bedside nurse Dorothea. Sat with Neto at bedside. We talked about how she is feeling. She relates that pain is better controled. She is still waiting for results of biopsy to determine type of cancer and plan for treatment. We discussed how difficult it is to wait for results and to wait to start treatment. We discussed that she is her decision maker. Reminded her that if she was unable to make her own decisions at any point that [address] [place] [address] dictates that her adult children would be her decision makers. They would work together to follow her wishes. She again expresses concern that they would not be able to work together and may not follow her wishes. She relates that she has decided that she is going to make her son her [address]. He has agreed to this. We talked about her having the discussion of what her wishes are with him so that he knows her wants and he will not have to guess. We talked about would she want a ventilator, if we could not get her off the ventilator would she want a tracheostomy and what that is, or would she want a feeding tube to keep her alive. Pt nods no to when trach and feeding tube. Reminded her that her decision maker needs to know her wishes so he can follow them. She relates she will have the conversation with her son. He is aware and is ready to talk when she is. Encouragement and support given. Let Neto know we could do the [address] paperwork before she leaves the hospital if she would like. Neto declines doing [address] paperwork at this time. I also talked to her today about palliative care being additional support that is available to her now and moving forward. Explained that palliative care is not end of life care, it is supportive care as she is going through a serious illness. Reminded her that palliative care can come to her at [facility] or her home or she can go to a palliative care clinic. Neto declines me setting up referral at this time. Reminded Neto that she will be hearing from the Oncology Nurse Navigator after discharge. She will be available as another layer of support to her. She will help her move forward with workup and getting into oncology office. Encouraged Neto to call [facility] office if we can help her moving forward. Neto declined prayer. Support and encouragement given. Pt relates she is interested in comfort right now. Wished her well with the transfer to [facility] and let her know I will be thinking of her and I wish her well. Encouraged her to call if we can help her.       Goals/Plan of care  Education/support to staff  Education/support to patient  Communications with primary service  Providing support for coping/adaptation/distress of patient  Discussing meaning/purpose   Specific spiritual beliefs/practices  Discharge to less acute setting  Validating patient/family distress  Continued communication updates  Recognizing, reflecting, and empathizing with the patient's anticipatory grief      Palliative Care Coordinator  Farhat VASQUEZ, RN, ONN-CG    [address] [place] [address] Office: [phone] [facility] Office: [phone]  [facility] Office: [phone]    For Symptom Management Clinic scheduling please call [phone]

## 2022-07-05 NOTE — PROGRESS NOTES
[address] [facility]    INPATIENT OCCUPATIONAL THERAPY  PROGRESS NOTE  Date: 2022  Patient Name: Cristofer Dawson      Room: 0266/6751-99  MRN: [id number]    : 1950  (81 y.o.) Gender: female     Discharge Recommendations:  Further Occupational Therapy is recommended upon facility discharge. OT Equipment Recommendations  Other: TBD    Referring Practitioner: Mannie Bell MD  Diagnosis: Pumonlary embolus  General  Chart Reviewed: Yes  Patient assessed for rehabilitation services?: Yes  Additional Pertinent Hx: 81 y.o.  female, with a history of choledocholithiasis, COPD, CAD, HTN, liver mass, morbid obesity, and DM type II, who presents with fatigue and abdominal pain. According to patient and EHR, she was discharged from this facility on  after receiving a new diagnosis of metastatic liver disease and suspicion for Pancreatic cancer. Patient reports that since her returning home, her abdominal pain has been uncontrolled. Reports that part of the problem is that she cannot remember when she takes her medication and therefore is afraid to take additional doses.   Patient reports that she is to have a liver biopsy completed; however, her insurance has not yet gotten back to her regarding her prior authorization approval.   Response to previous treatment: Patient with no complaints from previous session  Family / Caregiver Present: No  Referring Practitioner: Mannie Bell MD  Diagnosis: Pumonlary embolus    Restrictions  Restrictions/Precautions: General Precautions,Fall Risk,Bed Alarm  Implants present? : Metal implants  Required Braces or Orthoses?: No      Subjective  Subjective: \" I am having a lot of pain in my stomach\"  RN aware   Comments: ok for therapy this date   Pain Level: 8  Pain Location: Abdomen  Overall Orientation Status: Within Functional Limits  Patient Observation  Observations: Pt is pleasant and motivated for therapy   Pain Assessment  Pain Assessment: 0-10  Pain Level: 8  Pain Location: Abdomen    Objective  Cognition  Overall Cognitive Status: WFL  Bed mobility  Rolling to Left: Stand by assistance  Rolling to Right: Stand by assistance  Supine to Sit: Stand by assistance  Sit to Supine: Stand by assistance  Scooting: Stand by assistance  Bed Mobility Comments: HOB flat, using bed rails   Balance  Sitting Balance: Supervision  Standing Balance: Contact guard assistance  Standing Balance  Time: 1-2 min   Activity: functional transfer   Comment: no device, bending to ground to  item   Functional Mobility  Functional - Mobility Device: No device  Activity: Other (at bed side )  Assist Level: Contact guard assistance  Functional Mobility Comments: assist with line mgt    ADL  Feeding: Setup  Grooming: Contact guard assistance (assist for brushing through hair )  UE Bathing: Stand by assistance  LE Bathing: Minimal assistance  UE Dressing: Stand by assistance  LE Dressing: Minimal assistance  Toileting: Minimal assistance  Additional Comments: ADL scores based on skilled clininical observation this date unless otherwise noted. Transfers  Sit to stand: Stand by assistance  Stand to sit: Stand by assistance  Type of ROM/Therapeutic Exercise  Type of ROM/Therapeutic Exercise: Resistive Bands  Comment: Pt completing BUE exercises this date with min resistive bands. Completing 15 reps, requring cues for correct use of bands. Pt complaining of minimal pain during activity. Completing to increase strength and endurance for IADL and ADL activites   Exercises  Scapular Protraction: x  Scapular Retraction: x  Shoulder Flexion: x  Shoulder Extension: x  Shoulder ABduction: x  Shoulder ADduction: x  Horizontal ABduction: x  Horizontal ADduction: x  Elbow Flexion: x  Elbow Extension: x  Supination: x  Pronation: x               Assessment  Performance deficits / Impairments: Decreased functional mobility ; Decreased ADL status; Decreased strength;Decreased endurance;Decreased balance;Decreased safe awareness;Decreased high-level IADLs  Prognosis: Good  Discharge Recommendations: Patient would benefit from continued therapy after discharge  Activity Tolerance: Patient limited by fatigue;Patient Tolerated treatment well                Patient Education:     Jenny Brandon  Method: demonstration and explanation       Outcome: acknowledged understanding     Plan  Plan  Times per Week: 4-5  Current Treatment Recommendations: ROM,Strengthening,Balance training,Functional mobility training,Endurance training,Safety education & training,Patient/Caregiver education & training,Equipment evaluation, education, & procurement,Self-Care / ADL,Home management training      Goals  Short Term Goals  Time Frame for Short term goals: By discharge  Short Term Goal 1: Pt will perform BADLs mod I and Good safety  Short Term Goal 2: Pt will perform transfers/functional mobility mod I, using least restrictive device, and Good safety  Short Term Goal 3: Pt will V/D use of energy conservation/work simplifcation techniques to increase safety and independence during self care  Short Term Goal 4: Pt will V/D fall prevention/home safety strategies that are applicable to her daily routine  Short Term Goal 5: Pt will demonstrate improved standing tolerance/balance AEB ability to tolerate standing for 3-5 minutes during 1-2 handed functional tasks with no LOB and mod I.    Short Term Goal 6: Pt will actively participate in 15+ minutes of therapeutic exercise/functional activity to promote safety and independence with self care and mobility    OT Individual Minutes  Time In: 0926  Time Out: 1006  Minutes: 40      Electronically signed by DELISA De La Fuente on 7/5/22 at 11:30 AM EDT

## 2022-07-05 NOTE — PROGRESS NOTES
Writer gave verbal report to Elisabet Roca RN at [facility] nurses [facility]. Answered all of Jessica Cloud questions.

## 2022-07-05 NOTE — PROGRESS NOTES
Pt is being discharged. All pts belongings were packed up. Pts IV was removed. Then pt was picked up and being discharged to [facility] house.

## 2022-07-05 NOTE — PLAN OF CARE
Problem: Discharge Planning  Goal: Discharge to home or other facility with appropriate resources  7/5/2022 1405 by Jason Ulloa RN  Outcome: Completed  Flowsheets (Taken 7/5/2022 0845)  Discharge to home or other facility with appropriate resources:   Identify barriers to discharge with patient and caregiver   Identify discharge learning needs (meds, wound care, etc)  7/5/2022 0057 by Laury Homans, RN  Outcome: Progressing     Problem: Pain  Goal: Verbalizes/displays adequate comfort level or baseline comfort level  7/5/2022 1405 by Jason Ulloa RN  Outcome: Completed  7/5/2022 0057 by Laury Homans, RN  Outcome: Progressing  Note: Patient medicated with oxycodone for abdomen pain. States oxycodone helps relieve the pain. Problem: Safety - Adult  Goal: Free from fall injury  7/5/2022 1405 by Jason Ulloa RN  Outcome: Completed  Flowsheets (Taken 7/5/2022 1404)  Free From Fall Injury: Instruct family/caregiver on patient safety  7/5/2022 0057 by Laury Homans, RN  Outcome: Progressing  Note: Patient weak. Bed alarm on. Alert and oriented. Using call light appropriately.      Problem: ABCDS Injury Assessment  Goal: Absence of physical injury  7/5/2022 1405 by Jason Ulloa RN  Outcome: Completed  Flowsheets (Taken 7/5/2022 1404)  Absence of Physical Injury: Implement safety measures based on patient assessment  7/5/2022 0057 by Laury Homans, RN  Outcome: Progressing     Problem: Chronic Conditions and Co-morbidities  Goal: Patient's chronic conditions and co-morbidity symptoms are monitored and maintained or improved  7/5/2022 1405 by Jsaon Ulloa RN  Outcome: Completed  Flowsheets (Taken 7/5/2022 0845)  Care Plan - Patient's Chronic Conditions and Co-Morbidity Symptoms are Monitored and Maintained or Improved: Monitor and assess patient's chronic conditions and comorbid symptoms for stability, deterioration, or improvement  7/5/2022 0057 by Laury Homans, RN  Outcome: Progressing     Problem: Skin/Tissue Integrity  Goal: Absence of new skin breakdown  Description: 1. Monitor for areas of redness and/or skin breakdown  2. Assess vascular access sites hourly  3. Every 4-6 hours minimum:  Change oxygen saturation probe site  4. Every 4-6 hours:  If on nasal continuous positive airway pressure, respiratory therapy assess nares and determine need for appliance change or resting period. 7/5/2022 1405 by Yariel Nation RN  Outcome: Completed  7/5/2022 0057 by Patricia Clark RN  Outcome: Progressing  Note: Buttocks reddened. Pressure relief mattress remains in place. Mepelix on coccyx.

## 2022-07-05 NOTE — CARE COORDINATION
SW received authorization for pt to go to [facility]. Transport setup via [facility] at 2:30pm. KIM needs to be completed. 7000 is complete. Pt and Nursing staff notified. # for report: [phone] ext.  102    Electronically signed by ALYSSA Bhakta on 7/5/2022 at 10:41 AM

## 2022-07-05 NOTE — PROGRESS NOTES
Writer couldn't read witting of written report given to RN on med surg last night tried called the pcu station for report got no call back. Pt arrived to her room will assess her soon.

## 2022-07-05 NOTE — CARE COORDINATION
ROXANN spoke with Krysten Andrew from [employer]. Krysten Andrew reported that patient is ready to admit, and Krysten Andrwe received a voicemail from [facility] on July 3rd. Patient will have to admit 7/5/2022, or else authorization will have to be restarted. ROXANN informed ALYSSA Cardozo of this information.  Electronically signed by Nancie Jaffe on 7/5/2022 at 8:49 AM

## 2022-07-05 NOTE — PLAN OF CARE
Problem: Discharge Planning  Goal: Discharge to home or other facility with appropriate resources  7/5/2022 0057 by Genesis Rosa RN  Outcome: Progressing  7/4/2022 1536 by Jackeline Berg RN  Outcome: Progressing     Problem: Pain  Goal: Verbalizes/displays adequate comfort level or baseline comfort level  7/5/2022 0057 by Genesis Rosa RN  Outcome: Progressing  Note: Patient medicated with oxycodone for abdomen pain. States oxycodone helps relieve the pain. 7/4/2022 1536 by Jackeline Berg RN  Outcome: Progressing     Problem: Safety - Adult  Goal: Free from fall injury  7/5/2022 0057 by Genesis Rosa RN  Outcome: Progressing  Note: Patient weak. Bed alarm on. Alert and oriented. Using call light appropriately. 7/4/2022 1536 by Jackeline Berg RN  Outcome: Progressing     Problem: ABCDS Injury Assessment  Goal: Absence of physical injury  7/5/2022 0057 by Genesis Rosa RN  Outcome: Progressing  7/4/2022 1536 by Jackeline Berg RN  Outcome: Progressing     Problem: Chronic Conditions and Co-morbidities  Goal: Patient's chronic conditions and co-morbidity symptoms are monitored and maintained or improved  Outcome: Progressing     Problem: Skin/Tissue Integrity  Goal: Absence of new skin breakdown  Description: 1. Monitor for areas of redness and/or skin breakdown  2. Assess vascular access sites hourly  3. Every 4-6 hours minimum:  Change oxygen saturation probe site  4. Every 4-6 hours:  If on nasal continuous positive airway pressure, respiratory therapy assess nares and determine need for appliance change or resting period. 7/5/2022 0057 by Genesis Rosa RN  Outcome: Progressing  Note: Buttocks reddened. Pressure relief mattress remains in place. Mepelix on coccyx.   7/4/2022 1536 by Jackeline Berg RN  Outcome: Progressing

## 2022-07-05 NOTE — FLOWSHEET NOTE
Patient in room eating her lunch, while her daughter packed her clothing. Patient stated that she was being discharge this afternoon. Patient welcomed prayer and was thankful for the visit. Patient appears to have good support through her family. 07/05/22 1115   Encounter Summary   Encounter Overview/Reason  Spiritual/Emotional Needs   Service Provided For: Patient and family together   Referral/Consult From: [employer] [facility];Family members   Last Encounter  07/05/22   Complexity of Encounter Low   Begin Time 1115   End Time  1130   Total Time Calculated 15 min   Spiritual/Emotional needs   Type Spiritual Support   Palliative Care   Type Palliative Care, Follow-up   Assessment/Intervention/Outcome   Assessment Calm; Hopeful   Intervention Active listening;Read/Provided Scripture;Prayer (assurance of)/[place];Sustaining Presence/Ministry of presence   Outcome Engaged in conversation;Expressed Gratitude;Receptive   Visited by Karmen Long

## 2022-07-05 NOTE — PROGRESS NOTES
Physical Therapy  [address]    Date: 22  Patient Name: Richard Bell       Room: 5916/3856-23  MRN: [id number]   Account: [de-identified]   : 1950  (81 y.o.) Gender: female     Referring Practitioner: Suze Mg MD  Diagnosis: Pumonlary embolus  Past Medical History:  has a past medical history of Acid reflux, Arthritis, Cataracts, bilateral, CHF (congestive heart failure) ([facility] COPD (chronic obstructive pulmonary disease) ([facility] [facility] Gall stones, Head injury, Hyperlipidemia, Hypertension, Insomnia, MI (myocardial infarction) ([facility] [facility] Neuropathy, Sleep apnea, and Type 2 diabetes mellitus without complication [facility] Past Surgical History:   has a past surgical history that includes Femur Surgery; Foot surgery (Left); Mandible fracture surgery; Cardiac surgery (); Ankle surgery (Left); Tonsillectomy; Coronary angioplasty with stent (); and IR BIOPSY LIVER PERCUTANEOUS (2022). Additional Pertinent Hx: Richard Bell is a 81 y.o. female who is admitted to the hospital on 2022  for fatigue and abd pain. Patient was recently admitted to hospital about a week ago and at that time she had a CT scan of the abdomen which showed multiple liver lesions suspicious for metastasis. She was evaluated by oncology. Her tumor markers showed very high CA 19-9 and CEA and AFP. 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 IR guided biopsy was ordered but because patient was on Plavix plan was to do biopsy as outpatient. CT chest abdomen pelvis and CT chest showed small pulmonary emboli in the right upper and right middle lobes with very small clot burden. Pt to have liver bipsy, oncology following    Overall Orientation Status: Within Functional Limits  Restrictions/Precautions  Restrictions/Precautions: General Precautions; Fall Risk;Bed Alarm  Required Braces or Orthoses?: No  Implants present? : Metal implants    Subjective: Patient laying in bed upon arrival. Complaining of a \"gut ache\" however, willingto participate in therapy.    Comments: URIEL culver thergemini        Pain Assessment: 0-10  Pain Level: 8  Pain Location: Abdomen     Oxygen Therapy  O2 Device: Nasal cannula  O2 Flow Rate (L/min): 2 L/min          Bed Mobility:   Bed Mobility  Rolling: Stand by assistance  Supine to Sit: Stand by assistance  Scooting: Stand by assistance  Bed mobility  Rolling to Left: Stand by assistance  Rolling to Right: Stand by assistance  Supine to Sit: Stand by assistance  Scooting: Stand by assistance    Transfers:  Sit to Stand: Stand by assistance  Stand to sit: Stand by assistance                 Ambulation  Surface: level tile  Device: No Device  Assistance: Contact guard assistance  Quality of Gait: Unsteady  Gait Deviations: Slow Ritika;Decreased step height;Decreased arm swing;Decreased head and trunk rotation  Distance: 20ft        Stairs/Curb  Stairs?: No    EXERCISES    Other exercises?: Yes  Other exercises 1: bed mobility   Other exercises 2: seated B LE exercises x20 reps            Activity Tolerance: Patient limited by fatigue  PT Equipment Recommendations  Equipment Needed: No       Current Treatment Recommendations: Strengthening,Balance training,Functional mobility training,Transfer training,Endurance training,Gait training,Safety education & training,Patient/Caregiver education & training,Therapeutic activities,Stair training    Conditions Requiring Skilled Therapeutic Intervention  Treatment Diagnosis: decreased endurance  Discharge Recommendations: Patient would benefit from continued therapy after discharge    Goals  Short Term Goals  Time Frame for Short term goals: 1 week  Short term goal 1: pt will perform bed mobility independently to improve function  Short term goal 2: pt will perform sit<>stand transfer with SBA and no device  Short term goal 3: pt will ambulate 150' with SBA to increase endurance  Short term goal 4: pt will perform 10 step ups onto a 6\" platform with handheld assist to safely return home       07/05/22 1030   PT Individual Minutes   Time In 0911   Time Out 0925   Minutes 14       Electronically signed by Alfredo Stevens PTA on 7/5/22 at 1:29 PM EDT

## 2022-07-06 ENCOUNTER — TELEPHONE (OUTPATIENT)
Dept: INTERNAL MEDICINE CLINIC | Age: 72
End: 2022-07-06

## 2022-07-06 ENCOUNTER — TELEPHONE (OUTPATIENT)
Dept: ONCOLOGY | Age: 72
End: 2022-07-06

## 2022-07-06 LAB — SURGICAL PATHOLOGY REPORT: NORMAL

## 2022-07-06 NOTE — TELEPHONE ENCOUNTER
Name: Dwayne Agee  : 1950  MRN: [id number]    Oncology Navigation- Initial Note:    Intake-  Contact Type: Telephone  Notes: Upon review of chart noted pt dc'd to [facility]. Attempted to contact pt, no answer, unable to leave VM r/t VM full. Attempted to contact [facility] nurse, [phone], no answer, VM left notified writer will be navigating oncology care & requested contact writer @ [phone]. Will continue to follow.     Electronically signed by Tilford Schlatter, RN on 2022 at 3:01 PM

## 2022-07-06 NOTE — PROGRESS NOTES
Physician Progress Note      Peña Elliott  [facility] #:                  [id number]  :                       1950  ADMIT DATE:       2022 6:49 PM  [address] [place] DATE:        2022 2:54 PM  RESPONDING  PROVIDER #:        Raj Wood          QUERY TEXT:    Patient admitted with PE. Noted documentation of acute on chronic diastolic   heart failure. In order to support the diagnosis of acute on chronic diastolic   heart failure, please include additional treatment indicators in your   documentation. Or please document if the diagnosis of acute on chronic   diastolic heart failure has been ruled out after further study. The medical record reflects the following:  Risk Factors: PE this admission, Hx HTN MI  Clinical Indicators: echo--> EF 50%, mild MR; BNP 1834, CXR-->Mild bibasal   fibrosis and small right pleural effusion. ; per ED notes--> presenting for ABD   pain, fatigue, LE swelling worse on L and SOB; Treatment: no diuretics given, BNP level, echo, monitoring, cardio consult  Options provided:  -- Acute on chronic diastolic heart failure present as evidenced by, Please   document evidence.   -- Acute on chronic diastolic heart failure was ruled out  -- Other - I will add my own diagnosis  -- Disagree - Not applicable / Not valid  -- Disagree - Clinically unable to determine / Unknown  -- Refer to Clinical Documentation Reviewer    PROVIDER RESPONSE TEXT:    Acute on chronic diastolic heart failure is present as evidenced by Elevated   pro-BNP and pleural effusion    Query created by: Breanna Thomas on 2022 10:48 AM      Electronically signed by:  Raj Wood 2022 8:37 AM

## 2022-07-07 ENCOUNTER — TELEPHONE (OUTPATIENT)
Dept: ONCOLOGY | Age: 72
End: 2022-07-07

## 2022-07-07 NOTE — TELEPHONE ENCOUNTER
Name: Olga Pineda  : 1950  MRN: [id number]    Oncology Navigation Follow-Up Note    Contact Type:  Telephone  Notes: Introductory phone call made to patient, instructed writer will be navigating oncology care. Notified pt writer attempting to coordinate post hospital f/u with [facility] nurse & await call back. Inquired on in person visit vs VV. Pt requested VV scheduled. Dr. Jovan Lundberg updated. Dr. Jovan Lundberg requested VV scheduled tomorrow @ [facility]. [facility] , updated on pt & Dr. Rodney Chavez request.  Pt scheduled  @ 1430. Pt updated on VV appt details & verbalized understanding. Discussed potential barriers to care, pt stated continues rehab @ [address] [facility] ambulating without cane/walker. Pt c/o fair appetite & stated bowels \"finally working\". Inquired on smoking hx, pt stated quit 10 years ago. Inquired on alcohol/drug use, pt denied any. Pt denied questions/concerns. Instructed pt may contact writer prn. Will continue to follow.     Electronically signed by Radha Miramontes RN on 2022 at 2:26 PM

## 2022-07-07 NOTE — TELEPHONE ENCOUNTER
Rupa Xiong, RN Navigator and Dr. Estella Guerra. I called Makayla BLAKE([phone]) to assist with getting patient scheduled for a virtual appt, tomorrow in [place] per Dr. Estella Guerra,  if she in unable to come into office for appointments. I spoke with Emily Foster at the nurses station for patient (extention [phone]) and she states that we will need to talk to Admissions in regards to scheduling any appts for patient. She transferred me to Justin Zarco in Admissions where I left message about scheduling appt. I left detailed message along with [facility] [place] office number, my number and Sarah Ortega RN navigator's number. Sent secure email to [facility] [place]  and updated Chavez Shahid.

## 2022-07-08 ENCOUNTER — TELEMEDICINE (OUTPATIENT)
Dept: ONCOLOGY | Age: 72
End: 2022-07-08
Payer: MEDICARE

## 2022-07-08 ENCOUNTER — TELEPHONE (OUTPATIENT)
Dept: ONCOLOGY | Age: 72
End: 2022-07-08

## 2022-07-08 DIAGNOSIS — C80.1 PANCREATOBILIARY-TYPE CARCINOMA (HCC): Primary | ICD-10-CM

## 2022-07-08 PROCEDURE — 99214 OFFICE O/P EST MOD 30 MIN: CPT | Performed by: INTERNAL MEDICINE

## 2022-07-08 PROCEDURE — 1123F ACP DISCUSS/DSCN MKR DOCD: CPT | Performed by: INTERNAL MEDICINE

## 2022-07-11 ENCOUNTER — HOSPITAL ENCOUNTER (OUTPATIENT)
Age: 72
Setting detail: SPECIMEN
Discharge: HOME | End: 2022-07-11

## 2022-07-11 LAB
ANION GAP SERPL CALCULATED.3IONS-SCNC: 12 MMOL/L (ref 9–17)
BUN BLDV-MCNC: 7 MG/DL (ref 8–23)
BUN/CREAT BLD: 10 (ref 9–20)
CALCIUM SERPL-MCNC: 8.3 MG/DL (ref 8.6–10.4)
CHLORIDE BLD-SCNC: 93 MMOL/L (ref 98–107)
CO2: 30 MMOL/L (ref 20–31)
CREAT SERPL-MCNC: 0.68 MG/DL (ref 0.5–0.9)
GFR AFRICAN AMERICAN: >60 ML/MIN
GFR NON-AFRICAN AMERICAN: >60 ML/MIN
GFR SERPL CREATININE-BSD FRML MDRD: ABNORMAL ML/MIN/{1.73_M2}
GLUCOSE BLD-MCNC: 69 MG/DL (ref 70–99)
HCT VFR BLD CALC: 32.7 % (ref 36.3–47.1)
HEMOGLOBIN: 9.8 G/DL (ref 11.9–15.1)
MCH RBC QN AUTO: 27.4 PG (ref 25.2–33.5)
MCHC RBC AUTO-ENTMCNC: 30 G/DL (ref 28.4–34.8)
MCV RBC AUTO: 91.3 FL (ref 82.6–102.9)
NRBC AUTOMATED: 0 PER 100 WBC
PDW BLD-RTO: 14.1 % (ref 11.8–14.4)
PLATELET # BLD: 710 K/UL (ref 138–453)
PMV BLD AUTO: 9.2 FL (ref 8.1–13.5)
POTASSIUM SERPL-SCNC: 3.5 MMOL/L (ref 3.7–5.3)
RBC # BLD: 3.58 M/UL (ref 3.95–5.11)
SODIUM BLD-SCNC: 135 MMOL/L (ref 135–144)
WBC # BLD: 17.5 K/UL (ref 3.5–11.3)

## 2022-07-11 PROCEDURE — 85027 COMPLETE CBC AUTOMATED: CPT

## 2022-07-11 PROCEDURE — P9603 ONE-WAY ALLOW PRORATED MILES: HCPCS

## 2022-07-11 PROCEDURE — 80048 BASIC METABOLIC PNL TOTAL CA: CPT

## 2022-07-11 PROCEDURE — 36415 COLL VENOUS BLD VENIPUNCTURE: CPT

## 2022-07-12 ENCOUNTER — TELEPHONE (OUTPATIENT)
Dept: ONCOLOGY | Age: 72
End: 2022-07-12

## 2022-07-12 DIAGNOSIS — C22.8 MALIGNANT NEOPLASM OF LIVER, PRIMARY, UNSPECIFIED AS TO TYPE (HCC): ICD-10-CM

## 2022-07-12 DIAGNOSIS — R16.0 LIVER MASS: Primary | ICD-10-CM

## 2022-07-12 NOTE — TELEPHONE ENCOUNTER
Name: Carol Ann Guzmán  : 1950  MRN: [id number]    Oncology Navigation Follow-Up Note    Contact Type:  Telephone  Notes: Spoke with Dr. Fiorella Leslie to inquire on orders. Dr. Fiorella Leslie stated okay to place PET/CT scan order, will complete VV note this am, & place rest of orders. PET/CT order placed. Spoke with Jace Noble, [facility] , updated on pt & requested PET/CT scan scheduled. Notified Jace Noble pt currently @ [place], [phone]. Spoke with pt updated PET/CT scan to be scheduled & writer will coordinate Dr. Fiorella Leslie f/u to discuss results. Pt verbalized understanding. Will continue to follow.     Electronically signed by George Duong RN on 2022 at 8:07 AM

## 2022-07-13 ENCOUNTER — TELEPHONE (OUTPATIENT)
Dept: INTERVENTIONAL RADIOLOGY/VASCULAR | Age: 72
End: 2022-07-13

## 2022-07-13 PROBLEM — I50.22 CHRONIC SYSTOLIC (CONGESTIVE) HEART FAILURE (HCC): Status: ACTIVE | Noted: 2022-07-13

## 2022-07-13 PROBLEM — C80.1 PANCREATOBILIARY-TYPE CARCINOMA (HCC): Status: ACTIVE | Noted: 2022-07-13

## 2022-07-13 RX ORDER — SODIUM CHLORIDE 9 MG/ML
5-250 INJECTION, SOLUTION INTRAVENOUS PRN
OUTPATIENT
Start: 2022-07-20

## 2022-07-13 RX ORDER — SODIUM CHLORIDE 9 MG/ML
5-40 INJECTION INTRAVENOUS PRN
OUTPATIENT
Start: 2022-07-20

## 2022-07-13 RX ORDER — EPINEPHRINE 1 MG/ML
0.3 INJECTION, SOLUTION, CONCENTRATE INTRAVENOUS PRN
OUTPATIENT
Start: 2022-07-13

## 2022-07-13 RX ORDER — ACETAMINOPHEN 325 MG/1
650 TABLET ORAL
OUTPATIENT
Start: 2022-07-20

## 2022-07-13 RX ORDER — ALBUTEROL SULFATE 90 UG/1
4 AEROSOL, METERED RESPIRATORY (INHALATION) PRN
OUTPATIENT
Start: 2022-07-20

## 2022-07-13 RX ORDER — SODIUM CHLORIDE 0.9 % (FLUSH) 0.9 %
5-40 SYRINGE (ML) INJECTION PRN
OUTPATIENT
Start: 2022-07-20

## 2022-07-13 RX ORDER — ALBUTEROL SULFATE 90 UG/1
4 AEROSOL, METERED RESPIRATORY (INHALATION) PRN
OUTPATIENT
Start: 2022-07-13

## 2022-07-13 RX ORDER — MEPERIDINE HYDROCHLORIDE 50 MG/ML
12.5 INJECTION INTRAMUSCULAR; INTRAVENOUS; SUBCUTANEOUS PRN
OUTPATIENT
Start: 2022-07-13

## 2022-07-13 RX ORDER — HEPARIN SODIUM (PORCINE) LOCK FLUSH IV SOLN 100 UNIT/ML 100 UNIT/ML
500 SOLUTION INTRAVENOUS PRN
OUTPATIENT
Start: 2022-07-20

## 2022-07-13 RX ORDER — SODIUM CHLORIDE 9 MG/ML
INJECTION, SOLUTION INTRAVENOUS CONTINUOUS
OUTPATIENT
Start: 2022-07-13

## 2022-07-13 RX ORDER — EPINEPHRINE 1 MG/ML
0.3 INJECTION, SOLUTION, CONCENTRATE INTRAVENOUS PRN
OUTPATIENT
Start: 2022-07-20

## 2022-07-13 RX ORDER — ONDANSETRON 2 MG/ML
8 INJECTION INTRAMUSCULAR; INTRAVENOUS
OUTPATIENT
Start: 2022-07-20

## 2022-07-13 RX ORDER — PALONOSETRON 0.05 MG/ML
0.25 INJECTION, SOLUTION INTRAVENOUS ONCE
OUTPATIENT
Start: 2022-07-20 | End: 2022-07-20

## 2022-07-13 RX ORDER — SODIUM CHLORIDE 0.9 % (FLUSH) 0.9 %
5-40 SYRINGE (ML) INJECTION PRN
OUTPATIENT
Start: 2022-07-13

## 2022-07-13 RX ORDER — SODIUM CHLORIDE 9 MG/ML
INJECTION, SOLUTION INTRAVENOUS CONTINUOUS
OUTPATIENT
Start: 2022-07-20

## 2022-07-13 RX ORDER — MEPERIDINE HYDROCHLORIDE 50 MG/ML
12.5 INJECTION INTRAMUSCULAR; INTRAVENOUS; SUBCUTANEOUS PRN
OUTPATIENT
Start: 2022-07-20

## 2022-07-13 RX ORDER — FAMOTIDINE 10 MG/ML
20 INJECTION, SOLUTION INTRAVENOUS
OUTPATIENT
Start: 2022-07-20

## 2022-07-13 RX ORDER — ACETAMINOPHEN 325 MG/1
650 TABLET ORAL
OUTPATIENT
Start: 2022-07-13

## 2022-07-13 RX ORDER — HEPARIN SODIUM (PORCINE) LOCK FLUSH IV SOLN 100 UNIT/ML 100 UNIT/ML
500 SOLUTION INTRAVENOUS PRN
OUTPATIENT
Start: 2022-07-13

## 2022-07-13 RX ORDER — SODIUM CHLORIDE 9 MG/ML
5-40 INJECTION INTRAVENOUS PRN
OUTPATIENT
Start: 2022-07-13

## 2022-07-13 RX ORDER — ONDANSETRON 2 MG/ML
8 INJECTION INTRAMUSCULAR; INTRAVENOUS
OUTPATIENT
Start: 2022-07-13

## 2022-07-13 RX ORDER — SODIUM CHLORIDE 9 MG/ML
5-250 INJECTION, SOLUTION INTRAVENOUS PRN
OUTPATIENT
Start: 2022-07-13

## 2022-07-13 RX ORDER — DIPHENHYDRAMINE HYDROCHLORIDE 50 MG/ML
50 INJECTION INTRAMUSCULAR; INTRAVENOUS
OUTPATIENT
Start: 2022-07-13

## 2022-07-13 RX ORDER — PALONOSETRON 0.05 MG/ML
0.25 INJECTION, SOLUTION INTRAVENOUS ONCE
OUTPATIENT
Start: 2022-07-13 | End: 2022-07-13

## 2022-07-13 RX ORDER — DIPHENHYDRAMINE HYDROCHLORIDE 50 MG/ML
50 INJECTION INTRAMUSCULAR; INTRAVENOUS
OUTPATIENT
Start: 2022-07-20

## 2022-07-13 RX ORDER — FAMOTIDINE 10 MG/ML
20 INJECTION, SOLUTION INTRAVENOUS
OUTPATIENT
Start: 2022-07-13

## 2022-07-13 NOTE — PATIENT INSTRUCTIONS
Tempus test  Start chemo after authorization  [facility]  RV at start of chemo

## 2022-07-13 NOTE — PROGRESS NOTES
2022    TELEHEALTH EVALUATION -- Audio/Visual (During EJRFU-23 public health emergency)    HPI:    Maksim Pederson (:  1950) has requested an audio/video evaluation for the following concern(s):    Pancreaticobiliary carcinoma  CHIEF COMPLAINT:    Chief Complaint   Patient presents with    Follow-up     review status of disease    Results    Other     plan moving forward      DIAGNOSIS:         1. 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 Acute PE  3. Peritoneal carcinomatosis  CURRENT THERAPY:         Start palliative chemotherapy with cisplatin/Gemzar. BRIEF CASE HISTORY:      Maksim Pederson is a 81 y.o. female who is admitted to the hospital on (Not on file)  for fatigue and abd pain. Patient was recently admitted to hospital about a week ago and at that time she had a CT scan of the abdomen which showed multiple liver lesions suspicious for metastasis. She was evaluated by oncology. Her tumor markers showed very high CA 19-9 and CEA and AFP. 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 IR guided biopsy was ordered but because patient was on Plavix plan was to do biopsy as outpatient. CT chest abdomen pelvis and CT chest showed small pulmonary emboli in the right upper and right middle lobes with very small clot burden. Oncology consulted for further evaluation. INTERIM HISTORY:   Patient seen for follow-up after pathology results on liver biopsy showing adenocarcinoma of pancreaticobiliary cancer. Patient was seen in the hospital and currently she is home. Continues to have abdominal distention. Pain is partially controlled. No nausea or vomiting. No active bleeding. No fever.   Past Medical History:   has a past medical history of Acid reflux, Arthritis, Cataracts, bilateral, CHF (congestive heart failure) ([address] COPD (chronic obstructive pulmonary disease) ([facility] Gall stones, Head injury, Hyperlipidemia, Hypertension, Insomnia, MI (myocardial infarction) ([facility] Neuropathy, Sleep apnea, and Type 2 diabetes mellitus without complication ([facility] Past Surgical History:   has a past surgical history that includes Femur Surgery; Foot surgery (Left); Mandible fracture surgery; Cardiac surgery (1999); Ankle surgery (Left); Tonsillectomy; Coronary angioplasty with stent (1999); and IR BIOPSY LIVER PERCUTANEOUS (6/29/2022). Medications:    Prior to Admission medications    Medication Sig Start Date End Date Taking? Authorizing Provider   docusate sodium (COLACE) 100 MG capsule Take 1 capsule by mouth 2 times daily as needed for Constipation 7/5/22  Yes Deyvi Cruz MD   gabapentin (NEURONTIN) 300 MG capsule Take 1 capsule by mouth 3 times daily for 5 days.  7/5/22 7/10/22 Yes Deyvi Cruz MD   apixaban (ELIQUIS) 5 MG TABS tablet Take 1 tablet by mouth 2 times daily 7/8/22 8/7/22 Yes Dena Valdovinos MD   clopidogrel (PLAVIX) 75 MG tablet Take 1 tablet by mouth daily 6/30/22  Yes Dena Valdovinos MD   glucose monitoring (FREESTYLE FREEDOM) kit 1 kit by Does not apply route daily as needed (diabetic) 6/24/22  Yes Deyvi Cruz MD   FreeStyle Lancets [facility] 1 each by Does not apply route daily 6/24/22  Yes Deyvi Cruz MD   Blood Glucose Monitoring Suppl (FREESTYLE LITE) STACY 1 Device by Does not apply route daily as needed (diabetic) 6/24/22  Yes Deyvi Cruz MD   Blood Glucose Monitoring Suppl (FREESTYLE FREEDOM LITE) w/Device KIT 1 kit by Does not apply route daily 6/24/22  Yes Deyvi Cruz MD   Insulin Syringe-Needle U-100 30G X 5/16\" 0.5 ML MISC 1 each by Does not apply route daily 6/24/22  Yes Deyvi Cruz MD   albuterol sulfate  (90 Base) MCG/ACT inhaler Inhale 2 puffs into the lungs every 6 hours as needed for Wheezing or Shortness of Breath 5/26/21  Yes BENNETT Kennedy CNP   ranolazine (RANEXA) 500 MG extended release tablet Take 1 tablet MG/DOSE, (OZEMPIC, 1 MG/DOSE,) 2 MG/1.5ML SOPN Inject 1 mg per week 9/30/20  Yes BENNETT Samano - CNP   Multiple Vitamins-Minerals (THERAPEUTIC MULTIVITAMIN-MINERALS) tablet Take 1 tablet by mouth daily 9/30/20  Yes Mauricio Balderas APRN - CNP   Melatonin 5 MG SUBL Place 1 applicator under the tongue nightly as needed (insomnia) 9/30/20  Yes Mauricio Balderas APRN - CNP   Insulin Pen Needle 32G X 4 MM MISC 1 each by Does not apply route once a week 3/12/20  Yes Mario De Oliveira MD   fluticasone [facility]) 50 MCG/ACT nasal spray 2 sprays by Nasal route daily 9/13/19  Yes Mario De Oliveira MD   apixaban (ELIQUIS) 5 MG TABS tablet Take 2 tablets by mouth 2 times daily for 14 doses 6/30/22 7/7/22  Gerrianne Cranker, MD     Current Outpatient Medications   Medication Sig Dispense Refill    docusate sodium (COLACE) 100 MG capsule Take 1 capsule by mouth 2 times daily as needed for Constipation      gabapentin (NEURONTIN) 300 MG capsule Take 1 capsule by mouth 3 times daily for 5 days.  15 capsule 3    apixaban (ELIQUIS) 5 MG TABS tablet Take 1 tablet by mouth 2 times daily 60 tablet 0    clopidogrel (PLAVIX) 75 MG tablet Take 1 tablet by mouth daily 30 tablet 3    glucose monitoring (FREESTYLE FREEDOM) kit 1 kit by Does not apply route daily as needed (diabetic) 1 kit 0    FreeStyle Lancets MISC 1 each by Does not apply route daily 100 each 3    Blood Glucose Monitoring Suppl (FREESTYLE LITE) STACY 1 Device by Does not apply route daily as needed (diabetic) 1 each 0    Blood Glucose Monitoring Suppl (FREESTYLE FREEDOM LITE) w/Device KIT 1 kit by Does not apply route daily 1 kit 0    Insulin Syringe-Needle U-100 30G X 5/16\" 0.5 ML MISC 1 each by Does not apply route daily 100 each 3    albuterol sulfate  (90 Base) MCG/ACT inhaler Inhale 2 puffs into the lungs every 6 hours as needed for Wheezing or Shortness of Breath 3 Inhaler 1    ranolazine (RANEXA) 500 MG extended release tablet Take 1 tablet by mouth 2 times daily 180 tablet 1    venlafaxine (EFFEXOR XR) 150 MG extended release capsule Take 1 capsule by mouth daily Take with 75 mg capsule 90 capsule 1    ARIPiprazole (ABILIFY) 5 MG tablet Take 1 tablet by mouth daily 90 tablet 1    budesonide-formoterol (SYMBICORT) 160-4.5 MCG/ACT AERO Inhale 2 puffs into the lungs 2 times daily 3 Inhaler 1    tiotropium (SPIRIVA RESPIMAT) 2.5 MCG/ACT AERS inhaler Inhale 2 puffs into the lungs daily 3 Inhaler 1    traZODone (DESYREL) 50 MG tablet take 1 tablet by mouth at bedtime if needed for sleep      oxybutynin (DITROPAN) 5 MG tablet take 1 tablet by mouth twice a day 90 tablet 1    glucose monitoring kit (FREESTYLE) monitoring kit 1 kit by Does not apply route daily 1 kit 0    blood glucose monitor strips Test 3 times a day & as needed for symptoms of irregular blood glucose. Dispense sufficient amount for indicated testing frequency plus additional to accommodate PRN testing needs.  100 strip 11    Lancets MISC 1 each by Does not apply route 3 times daily 100 each 11    atorvastatin (LIPITOR) 80 MG tablet Take 1 tablet by mouth daily 90 tablet 1    bisoprolol (ZEBETA) 5 MG tablet take 1 tablet by mouth once daily 90 tablet 1    omeprazole (PRILOSEC) 40 MG delayed release capsule take 1 capsule by mouth every morning BEFORE BREAKFAST 90 capsule 1    venlafaxine (EFFEXOR XR) 75 MG extended release capsule Take 1 capsule by mouth daily 90 capsule 1    glimepiride (AMARYL) 2 MG tablet take 1 tablet by mouth twice a day before meals 180 tablet 1    Semaglutide, 1 MG/DOSE, (OZEMPIC, 1 MG/DOSE,) 2 MG/1.5ML SOPN Inject 1 mg per week 4 pen 11    Multiple Vitamins-Minerals (THERAPEUTIC MULTIVITAMIN-MINERALS) tablet Take 1 tablet by mouth daily 90 tablet 1    Melatonin 5 MG SUBL Place 1 applicator under the tongue nightly as needed (insomnia) 90 tablet 1    Insulin Pen Needle 32G X 4 MM MISC 1 each by Does not apply route once a week 200 each 3    fluticasone (FLONASE) 50 MCG/ACT nasal spray 2 sprays by Nasal route daily 1 Bottle 2    apixaban (ELIQUIS) 5 MG TABS tablet Take 2 tablets by mouth 2 times daily for 14 doses 28 tablet 0     No current facility-administered medications for this visit. Allergies:  Chantix [varenicline tartrate] and Sulphadimidine [sulfamethazine]    Social History:   reports that she quit smoking about 22 years ago. Her smoking use included cigarettes. She has a 40.00 pack-year smoking history. She has never used smokeless tobacco. She reports that she does not currently use alcohol after a past usage of about 1.0 standard drink per week. She reports that she does not currently use drugs after having used the following drugs: Marijuana Candiss Littler). Family History: family history includes Other in her father; Stroke in her mother. REVIEW OF SYSTEMS:    Constitutional: No fever or chills. No night sweats, no weight loss   Eyes: No eye discharge, double vision, or eye pain   HEENT: negative for sore mouth, sore throat, hoarseness and voice change   Respiratory: negative for cough , sputum, dyspnea, wheezing, hemoptysis, chest pain   Cardiovascular: negative for chest pain, dyspnea, palpitations, orthopnea, PND   Gastrointestinal: negative for nausea, vomiting, diarrhea, constipation, abdominal pain, Dysphagia, hematemesis and hematochezia   Genitourinary: negative for frequency, dysuria, nocturia, urinary incontinence, and hematuria   Integument: negative for rash, skin lesions, bruises. Hematologic/Lymphatic: negative for easy bruising, bleeding, lymphadenopathy, or petechiae   Endocrine: negative for heat or cold intolerance,weight changes, change in bowel habits and hair loss   Musculoskeletal: negative for myalgias, arthralgias, pain, joint swelling,and bone pain   Neurological: negative for headaches, dizziness, seizures, weakness, numbness    PHYSICAL EXAM:      There were no vitals taken for this visit.    Temp (24hrs), Av.4 °F (36.9 °C), Min:97.8 °F (36.6 °C), Max:99.2 °F (37.3 °C)    PHYSICAL EXAMINATION:  [ INSTRUCTIONS:  \"[x]\" Indicates a positive item  \"[]\" Indicates a negative item  -- DELETE ALL ITEMS NOT EXAMINED]  Vital Signs: (As obtained by patient/caregiver or practitioner observation)    Blood pressure-  Heart rate-    Respiratory rate-    Temperature-  Pulse oximetry-     Constitutional: [x] Appears well-developed and well-nourished [x] No apparent distress      [] Abnormal-   Mental status  [x] Alert and awake  [x] Oriented to person/place/time [x]Able to follow commands      Eyes:  EOM    [x]  Normal  [] Abnormal-  Sclera  [x]  Normal  [] Abnormal -         Discharge [x]  None visible  [] Abnormal -    HENT:   [x] Normocephalic, atraumatic. [] Abnormal   [x] Mouth/Throat: Mucous membranes are moist.     External Ears [x] Normal  [] Abnormal-     Neck: [x] No visualized mass     Pulmonary/Chest: [x] Respiratory effort normal.  [x] No visualized signs of difficulty breathing or respiratory distress        [] Abnormal-      Musculoskeletal:   [] Normal gait with no signs of ataxia         [x] Normal range of motion of neck        [] Abnormal-       Neurological:        [x] No Facial Asymmetry (Cranial nerve 7 motor function) (limited exam to video visit)          [x] No gaze palsy        [] Abnormal-         Skin:        [x] No significant exanthematous lesions or discoloration noted on facial skin         [] Abnormal-            Psychiatric:       [x] Normal Affect [x] No Hallucinations        [] Abnormal-     Other pertinent observable physical exam findings-       DATA:    Labs:   CBC:   Recent Labs     07/18/22  0708   WBC 18.4*   HGB 9.8*   HCT 31.9*   *     BMP:   Recent Labs     07/18/22  0708      K 3.8   CO2 27   BUN 6*   CREATININE 0.63   LABGLOM >60   GLUCOSE 37*     PT/INR:   No results for input(s): PROTIME, INR in the last 72 hours.     IMAGING DATA:  IR PROCEDURAL REQUEST    (Results Pending)   Biopsy from June 29, 2022:  LIVER, MASS, ULTRASOUND-GUIDED CORE NEEDLE BIOPSIES:             -  WELL TO MODERATELY DIFFERENTIATED ADENOCARCINOMA, SEE   COMMENT. -- Diagnosis Comment --   THIS PATIENT HAS MARKEDLY ELEVATED , ELEVATED CEA AND SERUM AFP   LEVELS. THE IMMUNOPROFILE IS NOT SPECIFIC, HOWEVER WOULD BE COMPATIBLE WITH   ORIGIN FROM THE PANCREATICOBILIARY TRACT. 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 Explained the nature of this cancer, staging, prognosis and treatment. I explained that the treatment at this point will be palliative and not curative. Discussed different options. Patient agreed to start active treatment. I will send biopsy for Tempus test.  In the interim I will start chemotherapy treatment with cisplatin and gemcitabine. I explained the benefits and possible side effects related to chemotherapy, which may include but not limited to nausea, vomiting, hair loss, mouth sores, allergy, neuropathy, fever infection sepsis, anemia and thrombocytopenia. Will refer to IR for Mediport insertion. Continue Eliquis for PE. It will be held 2 days before the Mediport insertion. Pain control. Patient's questions were answered to the best of her satisfaction and she verbalized full understanding and agreement.                               [facility]                            This note is created with the assistance of a speech recognition program.  While intending to generate a document that actually reflects the content of the visit, the document can still have some errors including those of syntax and sound a like substitutions which may escape proof reading. It such instances, actual meaning can be extrapolated by contextual diversion. Hank Teran, was evaluated through a synchronous (real-time) audio-video encounter. The patient (or guardian if applicable) is aware that this is a billable service, which includes applicable co-pays. This Virtual Visit was conducted with patient's (and/or legal guardian's) consent. The visit was conducted pursuant to the emergency declaration under the [address] [place] [address], [address] [place] [address] authority and the [employer] General Act. Patient identification was verified, and a caregiver was present when appropriate. The patient was located at Home: [address] [place] [address]. Provider was located at [place] [address]): [address] [place] [address].. Total time spent on this encounter: Not billed by time      An electronic signature was used to authenticate this note.

## 2022-07-14 ENCOUNTER — TELEPHONE (OUTPATIENT)
Dept: ONCOLOGY | Age: 72
End: 2022-07-14

## 2022-07-14 ENCOUNTER — HOSPITAL ENCOUNTER (OUTPATIENT)
Age: 72
Setting detail: SPECIMEN
Discharge: HOME | End: 2022-07-14
Payer: COMMERCIAL

## 2022-07-14 ENCOUNTER — TELEPHONE (OUTPATIENT)
Dept: INFUSION THERAPY | Age: 72
End: 2022-07-14

## 2022-07-14 DIAGNOSIS — C80.1 PANCREATOBILIARY-TYPE CARCINOMA (HCC): Primary | ICD-10-CM

## 2022-07-14 LAB
HCT VFR BLD CALC: 35.1 % (ref 36.3–47.1)
HEMOGLOBIN: 10.6 G/DL (ref 11.9–15.1)
MCH RBC QN AUTO: 27.5 PG (ref 25.2–33.5)
MCHC RBC AUTO-ENTMCNC: 30.2 G/DL (ref 28.4–34.8)
MCV RBC AUTO: 91.2 FL (ref 82.6–102.9)
NRBC AUTOMATED: 0 PER 100 WBC
PDW BLD-RTO: 14.2 % (ref 11.8–14.4)
PLATELET # BLD: 698 K/UL (ref 138–453)
PMV BLD AUTO: 9.5 FL (ref 8.1–13.5)
RBC # BLD: 3.85 M/UL (ref 3.95–5.11)
WBC # BLD: 16.4 K/UL (ref 3.5–11.3)

## 2022-07-14 PROCEDURE — 85027 COMPLETE CBC AUTOMATED: CPT

## 2022-07-14 PROCEDURE — 36415 COLL VENOUS BLD VENIPUNCTURE: CPT

## 2022-07-14 PROCEDURE — P9603 ONE-WAY ALLOW PRORATED MILES: HCPCS

## 2022-07-14 NOTE — TELEPHONE ENCOUNTER
Per 7/8/22 AVS instructions from Dr Allison Wylie. Tempus order initiated on path #[id number] liver mass bx. Order, path, md fay note left on clipboard for Dr Allison Wylie to liz test options and sign when he returns to office tomorrow. Will need faxed to [facility] when complete. Email sent to Erlin Davis [employer], requesting mobile liquid bx draw to [employer] [facility] on [address] RN pt unable to come to [facility] for lab draw.

## 2022-07-14 NOTE — TELEPHONE ENCOUNTER
Name: Jose Juan Weber  : 1950  MRN: [id number]    Oncology Navigation Follow-Up Note    Contact Type:  Telephone  Notes: Upon review of chart noted Dr. Dylon Broderick [facility] [address] [facility] Per d/c instructions Dr. Dylon Broderick requested tempus testing. Spoke with Felipe Lima, [facility] triage nurse, updated on instructions for tempus testing. Felipe Lima stated will complete tempus demographic form & f/u with Dr. Dylon Broderick in am to obtain order/signature. Notified Felipe Lima pt currently @ [facility] for rehab & will need to coordinate mobile tempus blood draw. Attempted to contact pt, no answer, unable to leave VM. Will continue to follow.     Electronically signed by Kriss Mendez RN on 2022 at 11:39 AM

## 2022-07-14 NOTE — TELEPHONE ENCOUNTER
Spoke to the nurse's station at [address] [place] [address] at 9:15 am. Told them that pt will be having a PET scan at the [facility] on 07/18/22 at 3:00. They confirmed that they will have her at the appointment. Called pt at 9:57 am and spoke to her and told her that the PET Scan was scheduled for 07/18/22 at 3:00 at the [address] her that I spoke with the nurse's station at [facility] to give them the information about the appointment. She had me repeat the appointment so she could write it down.      Electronically signed by Herberth Gonsales on 7/14/2022 at 12:11 PM

## 2022-07-15 ENCOUNTER — TELEPHONE (OUTPATIENT)
Dept: ONCOLOGY | Age: 72
End: 2022-07-15

## 2022-07-15 NOTE — TELEPHONE ENCOUNTER
All documentation faxed to [facility]. Order scanned into chart and copy placed in MA bin for f/u.

## 2022-07-15 NOTE — TELEPHONE ENCOUNTER
Assisting RN Navigator. I called [facility] ([phone] ext [phone]) to confirm patient's transportation for PET on 7/18/22. I spoke with patient's nurse Rey Smith. Rey Smith states that she did not get a message that PET was scheduled. I read noted from [facility] [place]  that they were called and notified prior to calling patient. Rey Smith found a written note that someone else took the message, Rey Smith states the messages does not make sense the way the person wrote it. She states she will set up transportation and if she can not patient's SO will take her. Rey Smith went on that patient has expressed to them that she is unsure if she wants to do the PET because she has over 25 piercings and does not want to remove them. Rey Smith states some will need to be roved with hemostats. Rey Smith will call IR to schedule port placement appointment. Phone numbers for PET, IR and navigation given to Rey Smith. Rey Smith states that she will call with any questions or concerns.

## 2022-07-18 ENCOUNTER — TELEPHONE (OUTPATIENT)
Dept: ONCOLOGY | Age: 72
End: 2022-07-18

## 2022-07-18 ENCOUNTER — HOSPITAL ENCOUNTER (OUTPATIENT)
Age: 72
Setting detail: SPECIMEN
Discharge: HOME | End: 2022-07-18

## 2022-07-18 LAB
ANION GAP SERPL CALCULATED.3IONS-SCNC: 12 MMOL/L (ref 9–17)
BUN BLDV-MCNC: 6 MG/DL (ref 8–23)
BUN/CREAT BLD: 10 (ref 9–20)
CALCIUM SERPL-MCNC: 8.3 MG/DL (ref 8.6–10.4)
CHLORIDE BLD-SCNC: 98 MMOL/L (ref 98–107)
CO2: 27 MMOL/L (ref 20–31)
CREAT SERPL-MCNC: 0.63 MG/DL (ref 0.5–0.9)
GFR AFRICAN AMERICAN: >60 ML/MIN
GFR NON-AFRICAN AMERICAN: >60 ML/MIN
GFR SERPL CREATININE-BSD FRML MDRD: ABNORMAL ML/MIN/{1.73_M2}
GLUCOSE BLD-MCNC: 37 MG/DL (ref 70–99)
HCT VFR BLD CALC: 31.9 % (ref 36.3–47.1)
HEMOGLOBIN: 9.8 G/DL (ref 11.9–15.1)
MCH RBC QN AUTO: 27.1 PG (ref 25.2–33.5)
MCHC RBC AUTO-ENTMCNC: 30.7 G/DL (ref 28.4–34.8)
MCV RBC AUTO: 88.4 FL (ref 82.6–102.9)
NRBC AUTOMATED: 0 PER 100 WBC
PDW BLD-RTO: 14.3 % (ref 11.8–14.4)
PLATELET # BLD: 726 K/UL (ref 138–453)
PMV BLD AUTO: 9.5 FL (ref 8.1–13.5)
POTASSIUM SERPL-SCNC: 3.8 MMOL/L (ref 3.7–5.3)
RBC # BLD: 3.61 M/UL (ref 3.95–5.11)
SODIUM BLD-SCNC: 137 MMOL/L (ref 135–144)
WBC # BLD: 18.4 K/UL (ref 3.5–11.3)

## 2022-07-18 PROCEDURE — 85027 COMPLETE CBC AUTOMATED: CPT

## 2022-07-18 PROCEDURE — P9603 ONE-WAY ALLOW PRORATED MILES: HCPCS

## 2022-07-18 PROCEDURE — 80048 BASIC METABOLIC PNL TOTAL CA: CPT

## 2022-07-18 PROCEDURE — 36415 COLL VENOUS BLD VENIPUNCTURE: CPT

## 2022-07-18 NOTE — TELEPHONE ENCOUNTER
Navigation notified that all PETs today is cancelled. I called and got patient rescheduled for 7/21/22 at [address] [place] [address] Road notified.

## 2022-07-18 NOTE — TELEPHONE ENCOUNTER
I called [facility] and spoke with Henok Alvarez and notified of issue with PET and new appt date and time. Shell Wing states patient express that she is not sure if she wants to do it or not but they will do their best to have patient there.

## 2022-07-18 NOTE — TELEPHONE ENCOUNTER
Received voice mail from Jeanette Kelly [facility], stating she had questions on behalf of patient. Message was left after I had left for the day. I called back first thing this morning and had to leave a message on the voice mail at the nurse's station. Phone [phone] ext [phone].

## 2022-07-18 NOTE — TELEPHONE ENCOUNTER
Name: Dilia Michael  : 1950  MRN: [id number]    Oncology Navigation Follow-Up Note    Contact Type:  Telephone  Notes: VM left for [facility] , [phone] ext [phone], requesting contact writer @ [phone] for pt update/coordination of care. Will continue to follow.     Electronically signed by Raul De La Torre RN on 2022 at 12:58 PM

## 2022-07-20 ENCOUNTER — TELEPHONE (OUTPATIENT)
Dept: ONCOLOGY | Age: 72
End: 2022-07-20

## 2022-07-20 NOTE — TELEPHONE ENCOUNTER
Name: Jeff Buenrostro  : 1950  MRN: [id number]    Oncology Navigation Follow-Up Note    Contact Type:  Telephone  Notes: Second attempt to contact [address] [place] [address] [facility] worker, [phone] ext [phone], no answer, VM left requested contact writer. Attempted to contact pt, no answer, unable to leave VM r/t VM full. Will continue to follow.     Electronically signed by Marquis Rory RN on 2022 at 1:24 PM

## 2022-07-20 NOTE — TELEPHONE ENCOUNTER
Name: Dana Reardon  : 1950  MRN: [id number]    Oncology Navigation Follow-Up Note    Contact Type:  Telephone  Notes: Erin Delgado, [facility] , called back as requested. Erin Delgado stated pt to d/c home with [facility] hosp[facility] care. Yamini Simmons, [facility] IR , updated on pt. Akash Sims, [facility] oncology navigation, updated on pt & requested PET/CT scan canceled. Samantha Medina, [facility] , updated on pt. Dr. Marita Bynum updated.       Electronically signed by Elijah Valente RN on 2022 at 3:34 PM

## 2022-12-28 NOTE — PROGRESS NOTES
[place] [address] Internal Medicine    Progress Note     7/2/2022    9:08 AM    Name:   Carol Ann Guzmán  MRN:     [id number]     Acct:      [de-identified]   Room:   2095/2095-01   Day:  5  Admit Date:  6/27/2022  6:49 PM    PCP:   Maddy Vega MD  Code Status:  Full Code    Subjective:     C/C:   Chief Complaint   Patient presents with    Fatigue    Abdominal Pain     Principal Problem:    Pulmonary embolus ([facility] [address]  Active Problems:    Liver mass    Diabetes mellitus type 2 in obese ([facility] [address]    Morbid obesity ([facility] [address]  Resolved Problems:    * No resolved hospital problems.  *      Patient unfortunately has multiple medical problem, and she is not a very good historian  She has history of coronary artery disease s/p CABG back in 1999, COPD, diabetes, hypertension, patient was recently discharged from the hospital, when she was admitted abdominal pain, hypokalemia  CT abdomen pelvis was done concerning for possible pancreatobiliary malignancy with liver mets   Patient, underwent CT chest in the emergency room, suggestive of PE  On heparin drip  Patient plan for liver biopsy today  Interval history  Patient is feeling better, still having abdominal pain  Had 1 episode of vomiting yesterday  Evaluated by oncologist        Recent Results (from the past 24 hour(s))   POC Glucose Fingerstick    Collection Time: 07/01/22 11:27 AM   Result Value Ref Range    POC Glucose 150 (H) 65 - 105 mg/dL   POC Glucose Fingerstick    Collection Time: 07/01/22  4:16 PM   Result Value Ref Range    POC Glucose 146 (H) 65 - 105 mg/dL   POC Glucose Fingerstick    Collection Time: 07/01/22  7:56 PM   Result Value Ref Range    POC Glucose 148 (H) 65 - 105 mg/dL   Basic Metabolic Panel w/ Reflex to MG    Collection Time: 07/02/22  5:32 AM   Result Value Ref Range    Glucose 120 (H) 70 - 99 mg/dL    BUN 6 (L) 8 - 23 mg/dL    CREATININE 0.65 0.50 - 0.90 mg/dL    Calcium 8.6 8.6 - 10.4 mg/dL    Sodium 135 135 - 144 mmol/L Potassium 4.9 3.7 - 5.3 mmol/L    Chloride 98 98 - 107 mmol/L    CO2 28 20 - 31 mmol/L    Anion Gap 9 9 - 17 mmol/L    GFR Non-African American >60 >60 mL/min    GFR African American >60 >60 mL/min    GFR Comment         POC Glucose Fingerstick    Collection Time: 07/02/22  7:05 AM   Result Value Ref Range    POC Glucose 139 (H) 65 - 105 mg/dL     Recent Labs     07/01/22  0701 07/01/22  1127 07/01/22  1616 07/01/22  1956 07/02/22  0705   POCGLU 125* 150* 146* 148* 139*        CT ABDOMEN PELVIS W IV CONTRAST Additional Contrast? None    Result Date: 6/27/2022  EXAMINATION: CTA OF THE CHEST; CT OF THE ABDOMEN AND PELVIS WITH CONTRAST 6/27/2022 6:09 pm; 6/27/2022 6:20 pm TECHNIQUE: CTA of the chest was performed after the administration of intravenous contrast.  Multiplanar reformatted images are provided for review. MIP images are provided for review. Automated exposure control, iterative reconstruction, and/or weight based adjustment of the mA/kV was utilized to reduce the radiation dose to as low as reasonably achievable.; CT of the abdomen and pelvis was performed with the administration of intravenous contrast. Multiplanar reformatted images are provided for review. Automated exposure control, iterative reconstruction, and/or weight based adjustment of the mA/kV was utilized to reduce the radiation dose to as low as reasonably achievable. COMPARISON: None CT abdomen pelvis 06/22/2022 HISTORY: ORDERING SYSTEM PROVIDED HISTORY: elevated D dimer, SOB TECHNOLOGIST PROVIDED HISTORY: elevated D dimer, SOB Decision Support Exception - unselect if not a suspected or confirmed emergency medical condition->Emergency Medical Condition (MA) Reason for Exam: elevated D dimer, SOB Relevant Medical/Surgical History: hx. of smoking and COPD. pt. states \"I was here lst week and they said I have cancer of some kind;not sure what kind yet. \"; ORDERING SYSTEM PROVIDED HISTORY: worsened abd pain, hx metastatic ca TECHNOLOGIST PROVIDED HISTORY: worsened abd pain, hx metastatic ca Decision Support Exception - unselect if not a suspected or confirmed emergency medical condition->Emergency Medical Condition (MA) Reason for Exam: pt. c/o SOB, body pains Relevant Medical/Surgical History: hx. of smoking, COPD. pt. states \"I was ere last week, and they said I have some kind of cancer. \" FINDINGS: Chest: Pulmonary Arteries: Pulmonary arteries are adequately opacified for evaluation. There are small filling defects in the right interlobar artery extending into the posterior segmental and subsegmental right upper lobe branches and to a lesser degree into the segmental branches of the medial segment right middle lobe with overall small clot burden. No saddle embolus. Main pulmonary artery is normal in caliber. Mediastinum: No enlarged mediastinal nodes with a lower paratracheal node anterior to the sumit and some subcarinal nodes which are upper limits of normal in caliber. Morphologically abnormal suspicious right supradiaphragmatic lymph nodes. Cardiomegaly. Three-vessel coronary artery calcifications. No pericardial effusion. There is no acute abnormality of the thoracic aorta. Lungs/pleura: Increased right pleural effusion with increased atelectatic changes in the dependent right lung parenchyma, greatest in the right lower lobe. Minimal left lower lobe atelectasis. Indeterminate 4 mm solid noncalcified nodule in the anterior segment left upper lobe. No pneumothorax or left-sided pleural effusion. Central airways are patent. Soft Tissues/Bones: Degenerative changes without lytic or blastic osseous lesion. Remote median sternotomy which is healed. Calcifications in both breasts with a rim calcified likely oil cyst in the medial right breast.  No axillary adenopathy. Abdomen/Pelvis: Organs: Again noted are innumerable hypodense masses in the liver compatible with hepatic metastases.   There is poor delineation of the gallbladder from the adjacent liver masses. There is cholelithiasis as well as choledocholithiasis with intrahepatic and extrahepatic biliary ductal dilatation which is overall similar to 5 days prior. The abnormal soft tissue attenuation extends toward the gastroduodenal junction with loss of the intervening fat plane. The spleen is normal in size and attenuation. The pancreas is atrophic. No peripancreatic inflammatory changes. Adrenal glands remain normal caliber. Kidneys enhance symmetrically aside from a tiny hypodensity in the lateral upper pole left kidney that is too small to definitively characterize. There is no hydronephrosis. Normal caliber ureters. GI/Bowel: The bowel remains normal in caliber without obstruction. An appendix is not definitively identified. Pelvis: The urinary bladder is normal in appearance. Diminutive postmenopausal female pelvic organs. Peritoneum/Retroperitoneum: No free air. Small volume ascites. Irregular peritoneal soft tissue nodularity is redemonstrated compatible with peritoneal carcinomatosis. Enlarged lymph nodes about the yi hepatis. No gross pelvic adenopathy. Infrarenal abdominal aortic aneurysm measuring up to 3.1 cm on a background of moderate to heavy atherosclerosis. Bones/Soft Tissues: Degenerative changes predominating in the mid to lower lumbar facets. No lytic or blastic osseous lesion. Small fat containing umbilical hernia with a peritoneal nodule at the hernia orifice. Mild anasarca. [CHEST Small pulmonary emboli in the right upper and right middle lobes with an overall small clot burden. Increased right pleural effusion and associated atelectasis of the right lung. Indeterminate 4 mm nodule in the left upper lobe which will warrant attention on follow-up in this patient with findings of intra-abdominal malignancy. Morphologically abnormal appearing supradiaphragmatic lymph nodes on the right suspicious for possible intrathoracic metastatic josh spread. ABDOMEN/PELVIS Findings in the abdomen and pelvis are overall not substantially changed from 5 days prior. Diffuse hepatic metastases with loss of the fat plane between the liver and the gastroduodenal junction worrisome for possible direct extension. Poor delineation of the gallbladder either due to extension of the hepatic disease or possibly due to primary biliary neoplasm. Cholelithiasis and choledocholithiasis with intrahepatic and extrahepatic biliary ductal dilatation, unchanged from recent prior. Peritoneal carcinomatosis and small volume presumably malignant ascites. Infrarenal abdominal aortic aneurysm spanning up to 3.1 cm with recommendations below. Critical results were called by Dr. Josué Narvaezty to Dr. Pack Necessary on 6/27/2022 at 10:22 PM EST. RECOMMENDATIONS: 3.1 cm infrarenal abdominal aortic aneurysm. Recommend follow-up every 3 years. Reference: [facility] Rad[facility] 5528;[id number]. XR CHEST PORTABLE    Result Date: 6/27/2022  EXAMINATION: ONE XRAY VIEW OF THE CHEST 6/27/2022 4:39 pm COMPARISON: 03/15/2010 HISTORY: ORDERING SYSTEM PROVIDED HISTORY: SOB, CP TECHNOLOGIST PROVIDED HISTORY: SOB, CP Reason for Exam: PT CO cough with SOB and CP X several days. FINDINGS: Postsurgical changes overlying the mediastinum. The cardiac size is normal. Mild bibasal fibrosis and small right pleural effusion. .  Pulmonary vascularity appears normal. There is mild ectasia of the thoracic aorta. There are degenerative changes in the spine . No acute bony abnormalities. The hilar structures are normal.     Mild bibasal fibrosis and small right pleural effusion. CT CHEST PULMONARY EMBOLISM W CONTRAST    Result Date: 6/27/2022  EXAMINATION: CTA OF THE CHEST; CT OF THE ABDOMEN AND PELVIS WITH CONTRAST 6/27/2022 6:09 pm; 6/27/2022 6:20 pm TECHNIQUE: CTA of the chest was performed after the administration of intravenous contrast.  Multiplanar reformatted images are provided for review.   MIP images are provided for review. Automated exposure control, iterative reconstruction, and/or weight based adjustment of the mA/kV was utilized to reduce the radiation dose to as low as reasonably achievable.; CT of the abdomen and pelvis was performed with the administration of intravenous contrast. Multiplanar reformatted images are provided for review. Automated exposure control, iterative reconstruction, and/or weight based adjustment of the mA/kV was utilized to reduce the radiation dose to as low as reasonably achievable. COMPARISON: None CT abdomen pelvis 06/22/2022 HISTORY: ORDERING SYSTEM PROVIDED HISTORY: elevated D dimer, SOB TECHNOLOGIST PROVIDED HISTORY: elevated D dimer, SOB Decision Support Exception - unselect if not a suspected or confirmed emergency medical condition->Emergency Medical Condition (MA) Reason for Exam: elevated D dimer, SOB Relevant Medical/Surgical History: hx. of smoking and COPD. pt. states \"I was here lst week and they said I have cancer of some kind;not sure what kind yet. \"; ORDERING SYSTEM PROVIDED HISTORY: worsened abd pain, hx metastatic ca TECHNOLOGIST PROVIDED HISTORY: worsened abd pain, hx metastatic ca Decision Support Exception - unselect if not a suspected or confirmed emergency medical condition->Emergency Medical Condition (MA) Reason for Exam: pt. c/o SOB, body pains Relevant Medical/Surgical History: hx. of smoking, COPD. pt. states \"I was ere last week, and they said I have some kind of cancer. \" FINDINGS: Chest: Pulmonary Arteries: Pulmonary arteries are adequately opacified for evaluation. There are small filling defects in the right interlobar artery extending into the posterior segmental and subsegmental right upper lobe branches and to a lesser degree into the segmental branches of the medial segment right middle lobe with overall small clot burden. No saddle embolus. Main pulmonary artery is normal in caliber.  Mediastinum: No enlarged mediastinal nodes with a lower paratracheal node anterior to the sumit and some subcarinal nodes which are upper limits of normal in caliber. Morphologically abnormal suspicious right supradiaphragmatic lymph nodes. Cardiomegaly. Three-vessel coronary artery calcifications. No pericardial effusion. There is no acute abnormality of the thoracic aorta. Lungs/pleura: Increased right pleural effusion with increased atelectatic changes in the dependent right lung parenchyma, greatest in the right lower lobe. Minimal left lower lobe atelectasis. Indeterminate 4 mm solid noncalcified nodule in the anterior segment left upper lobe. No pneumothorax or left-sided pleural effusion. Central airways are patent. Soft Tissues/Bones: Degenerative changes without lytic or blastic osseous lesion. Remote median sternotomy which is healed. Calcifications in both breasts with a rim calcified likely oil cyst in the medial right breast.  No axillary adenopathy. Abdomen/Pelvis: Organs: Again noted are innumerable hypodense masses in the liver compatible with hepatic metastases. There is poor delineation of the gallbladder from the adjacent liver masses. There is cholelithiasis as well as choledocholithiasis with intrahepatic and extrahepatic biliary ductal dilatation which is overall similar to 5 days prior. The abnormal soft tissue attenuation extends toward the gastroduodenal junction with loss of the intervening fat plane. The spleen is normal in size and attenuation. The pancreas is atrophic. No peripancreatic inflammatory changes. Adrenal glands remain normal caliber. Kidneys enhance symmetrically aside from a tiny hypodensity in the lateral upper pole left kidney that is too small to definitively characterize. There is no hydronephrosis. Normal caliber ureters. GI/Bowel: The bowel remains normal in caliber without obstruction. An appendix is not definitively identified. Pelvis: The urinary bladder is normal in appearance.   Diminutive postmenopausal female pelvic organs. Peritoneum/Retroperitoneum: No free air. Small volume ascites. Irregular peritoneal soft tissue nodularity is redemonstrated compatible with peritoneal carcinomatosis. Enlarged lymph nodes about the yi hepatis. No gross pelvic adenopathy. Infrarenal abdominal aortic aneurysm measuring up to 3.1 cm on a background of moderate to heavy atherosclerosis. Bones/Soft Tissues: Degenerative changes predominating in the mid to lower lumbar facets. No lytic or blastic osseous lesion. Small fat containing umbilical hernia with a peritoneal nodule at the hernia orifice. Mild anasarca. [CHEST Small pulmonary emboli in the right upper and right middle lobes with an overall small clot burden. Increased right pleural effusion and associated atelectasis of the right lung. Indeterminate 4 mm nodule in the left upper lobe which will warrant attention on follow-up in this patient with findings of intra-abdominal malignancy. Morphologically abnormal appearing supradiaphragmatic lymph nodes on the right suspicious for possible intrathoracic metastatic josh spread. ABDOMEN/PELVIS Findings in the abdomen and pelvis are overall not substantially changed from 5 days prior. Diffuse hepatic metastases with loss of the fat plane between the liver and the gastroduodenal junction worrisome for possible direct extension. Poor delineation of the gallbladder either due to extension of the hepatic disease or possibly due to primary biliary neoplasm. Cholelithiasis and choledocholithiasis with intrahepatic and extrahepatic biliary ductal dilatation, unchanged from recent prior. Peritoneal carcinomatosis and small volume presumably malignant ascites. Infrarenal abdominal aortic aneurysm spanning up to 3.1 cm with recommendations below. Critical results were called by Dr. Scout Schreiber to Dr. Estefanía Breen on 6/27/2022 at 10:22 PM EST. RECOMMENDATIONS: 3.1 cm infrarenal abdominal aortic aneurysm.  Recommend follow-up every 3 compressibility. Normal compressibility of the great  Normal compressibility of the great  saphenous vein. saphenous vein. Normal compressibility of the small  Normal compressibility of the small  saphenous vein. saphenous vein. Velocities are measured in cm/s ; Diameters are measured in cm Right Lower Extremities DVT Study Measurements Right 2D Measurements +------------------------------------+----------+---------------+----------+ ! Location                            ! Visualized! Compressibility! Thrombosis! +------------------------------------+----------+---------------+----------+ ! Common Femoral                      !Yes       ! Yes            ! None      ! +------------------------------------+----------+---------------+----------+ ! Prox Femoral                        !Yes       ! Yes            ! None      ! +------------------------------------+----------+---------------+----------+ ! Mid Femoral                         !Yes       ! Yes            ! None      ! +------------------------------------+----------+---------------+----------+ ! Dist Femoral                        !Yes       ! Yes            ! None      ! +------------------------------------+----------+---------------+----------+ ! Popliteal                           !Yes       ! Yes            ! None      ! +------------------------------------+----------+---------------+----------+ ! Sapheno Femoral Junction            ! Yes       ! Yes            ! None      ! +------------------------------------+----------+---------------+----------+ ! PTV                                 ! No        !               !          ! +------------------------------------+----------+---------------+----------+ ! Peroneal                            !No        !               !          ! +------------------------------------+----------+---------------+----------+ ! Gastroc                             ! Yes       ! Yes            ! None      ! +------------------------------------+----------+---------------+----------+ ! Mid Femoral                         !Yes       ! Yes            ! None      ! +------------------------------------+----------+---------------+----------+ ! Dist Femoral                        !Yes       ! Yes            ! None      ! +------------------------------------+----------+---------------+----------+ ! Popliteal                           !Yes       ! Yes            ! None      ! +------------------------------------+----------+---------------+----------+ ! Sapheno Femoral Junction            ! Yes       ! Yes            ! None      ! +------------------------------------+----------+---------------+----------+ ! PTV                                 ! No        !               !          ! +------------------------------------+----------+---------------+----------+ ! Peroneal                            !No        !               !          ! +------------------------------------+----------+---------------+----------+ ! Gastroc                             ! Yes       ! Yes            ! None      ! +------------------------------------+----------+---------------+----------+ ! GSV Thigh                           ! Yes       ! Yes            ! None      ! +------------------------------------+----------+---------------+----------+ ! GSV Knee                            ! Yes       ! Yes            ! None      ! +------------------------------------+----------+---------------+----------+ ! GSV Ankle                           ! Yes       ! Yes            ! None      ! +------------------------------------+----------+---------------+----------+ ! SSV                                 ! Yes       ! Yes            ! None      ! +------------------------------------+----------+---------------+----------+ Left Doppler Measurements +---------------------------+------+------+--------------------------------+ ! Location                   ! Signal!Reflux! Reflux (msec) ! +---------------------------+------+------+--------------------------------+ ! Common Femoral             !Phasic!      !                                ! +---------------------------+------+------+--------------------------------+ ! Prox Femoral               !Phasic!      !                                ! +---------------------------+------+------+--------------------------------+ ! Popliteal                  !Phasic!      !                                ! +---------------------------+------+------+--------------------------------+            On admission         Review of Systems:     As recorded in HPI          Physical Examination:        Vitals:    07/02/22 0015 07/02/22 0645 07/02/22 0700 07/02/22 0711   BP: 104/63  92/67    Pulse: 70  75 77   Resp: 18  20 18   Temp: 98.3 °F (36.8 °C) (!) 69 °F (20.6 °C) 98.4 °F (36.9 °C)    TempSrc: Oral  Oral    SpO2: 93%  95% 95%   Weight: 221 lb 5.5 oz (100.4 kg)      Height:           Recent Labs     07/01/22  1127 07/01/22  1616 07/01/22  1956 07/02/22  0705   POCGLU 150* 146* 148* 139*       Intake/Output Summary (Last 24 hours) at 7/2/2022 0908  Last data filed at 7/2/2022 8230  Gross per 24 hour   Intake 120 ml   Output 300 ml   Net -180 ml       General Appearance:  alert, well appearing, and in no acute distress, central obesity present  Mental status:   Head:  normocephalic, atraumatic. Eye: no icterus, redness, pupils equal and reactive, extraocular eye movements intact, conjunctiva clear  Ear: normal external ear, no discharge, hearing intact  Nose:  no drainage noted  Mouth: mucous membranes moist  Neck: supple, no carotid bruits, thyroid not palpable  Lungs: Bilateral equal air entry, clear to ausculation, no wheezing, rales or rhonchi, normal effort  Cardiovascular: normal rate, regular rhythm, no murmur, gallop, rub.   Abdomen: Soft, tenderness in right upper quadrant, epigastric area, liver palpable under right rib cage  Neurologic: There are no new focal motor or sensory deficits,   Skin: No gross lesions, rashes, bruising or bleeding on exposed skin area  Extremities:  peripheral pulses palpable, no pedal edema or calf pain with palpation  Psych:             Data:     PLabs:    BMP:   Recent Labs     07/01/22  0541 07/02/22  0532    135   K 4.1 4.9   CO2 26 28   BUN 5* 6*   CREATININE 0.66 0.65   LABGLOM >60 >60   GLUCOSE 130* 120*                 Medications: Allergies:     Allergies   Allergen Reactions    Chantix [Varenicline Tartrate]      Nightmares     Sulphadimidine [Sulfamethazine]        Current Meds:   Scheduled Meds:    oxyCODONE  10 mg Oral 2 times per day    apixaban  10 mg Oral BID    Followed by   Raquel Sethi ON 7/7/2022] apixaban  5 mg Oral BID    clopidogrel  75 mg Oral Daily    sodium chloride flush  5-40 mL IntraVENous 2 times per day    ARIPiprazole  5 mg Oral Daily    atorvastatin  80 mg Oral Daily    metoprolol succinate  50 mg Oral Daily    budesonide-formoterol  2 puff Inhalation BID    fluticasone  2 spray Nasal Daily    gabapentin  600 mg Oral TID    lisinopril  10 mg Oral Daily    therapeutic multivitamin-minerals  1 tablet Oral Daily    pantoprazole  40 mg Oral QAM AC    oxybutynin  5 mg Oral BID    ranolazine  500 mg Oral BID    tiotropium  2 puff Inhalation Daily    venlafaxine  150 mg Oral Daily    venlafaxine  75 mg Oral Daily     Continuous Infusions:    sodium chloride       PRN Meds: ALPRAZolam, melatonin, HYDROmorphone, sodium chloride flush, sodium chloride, magnesium hydroxide, acetaminophen **OR** acetaminophen, albuterol sulfate HFA, oxyCODONE, traZODone, potassium chloride **OR** potassium alternative oral replacement **OR** potassium chloride, perflutren lipid microspheres, sodium chloride flush          Assessment:        Primary Problem  Pulmonary embolus (HCC)    Principal Problem:    Pulmonary embolus (HCC)  Active Problems:    Liver mass    Diabetes mellitus type 2 in obese [facility])    Morbid obesity ([address]  Resolved Problems:    * No resolved hospital problems. 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 Kb Vital yesterday  Patient, will have liver biopsy today, likely start on Eliquis tomorrow  Will be on Plavix and Eliquis long-term, will avoid triple therapy  Patient will need placement    6/30   Patient, clinically doing better  Started patient on Plavix, changing heparin drip to Eliquis  Patient need to be on Eliquis and Plavix for rest of her life  Will discontinue aspirin    To patient length  Patient will be getting discharge to SNF, awaiting placement  Overall prognosis guarded  No bleeding from the local site  7/2  Patient, had episode of chest pain  Evaluated by cardiologist  Chest pain determined to be atypical, constant in nature  DC planning, awaiting placement   . Hospital Problems           Last Modified POA    * (Principal) Pulmonary embolus ([address] 6/28/2022 Yes    Liver mass 6/28/2022 Yes    Diabetes mellitus type 2 in obese [facility]) 6/28/2022 Yes    Morbid obesity ([address] 6/28/2022 Yes                         Thanks for consulting us . Will monitor vitals and clinical course , and  Optimize therapy  as needed .            Beny Sanchez MD  7/2/2022 no